# Patient Record
Sex: FEMALE | Race: WHITE | NOT HISPANIC OR LATINO | Employment: UNEMPLOYED | URBAN - METROPOLITAN AREA
[De-identification: names, ages, dates, MRNs, and addresses within clinical notes are randomized per-mention and may not be internally consistent; named-entity substitution may affect disease eponyms.]

---

## 2017-07-25 ENCOUNTER — HISTORICAL (OUTPATIENT)
Dept: INTERNAL MEDICINE | Facility: CLINIC | Age: 42
End: 2017-07-25

## 2017-08-08 ENCOUNTER — HISTORICAL (OUTPATIENT)
Dept: INTERNAL MEDICINE | Facility: CLINIC | Age: 42
End: 2017-08-08

## 2018-08-08 LAB
HUMAN PAPILLOMAVIRUS (HPV): NORMAL
POC BETA-HCG (QUAL): NEGATIVE

## 2018-08-18 LAB
PAP RECOMMENDATION EXT: NORMAL
PAP SMEAR: NORMAL

## 2018-09-07 ENCOUNTER — HISTORICAL (OUTPATIENT)
Dept: RADIOLOGY | Facility: HOSPITAL | Age: 43
End: 2018-09-07

## 2019-06-26 ENCOUNTER — HISTORICAL (OUTPATIENT)
Dept: ADMINISTRATIVE | Facility: HOSPITAL | Age: 44
End: 2019-06-26

## 2019-06-26 LAB
PROLACTIN LEVEL (OHS): 3.1 NG/ML (ref 1–24)
TSH SERPL-ACNC: 2.44 MIU/L (ref 0.36–3.74)

## 2022-04-10 ENCOUNTER — HISTORICAL (OUTPATIENT)
Dept: ADMINISTRATIVE | Facility: HOSPITAL | Age: 47
End: 2022-04-10
Payer: MEDICAID

## 2022-04-30 VITALS
WEIGHT: 227.06 LBS | HEIGHT: 67 IN | OXYGEN SATURATION: 98 % | DIASTOLIC BLOOD PRESSURE: 100 MMHG | BODY MASS INDEX: 35.64 KG/M2 | SYSTOLIC BLOOD PRESSURE: 150 MMHG

## 2022-04-30 NOTE — PROGRESS NOTES
Patient:   Serge Krishnamurthy             MRN: [id number]            FIN: [id number]               Age:   41 years     Sex:  Female     :  1975   Associated Diagnoses:   None   Author:   Denise Sandoval MD      Notified Ms. Krishnamurthy of breast ultrasound results. Also discussed case with Dr. Nolasco, who recommended close f/u for ductal ectasia. All questions answered. Will continue close observation with breast ultrasound every 6 months.

## 2022-05-13 ENCOUNTER — TELEPHONE (OUTPATIENT)
Dept: FAMILY MEDICINE | Facility: CLINIC | Age: 47
End: 2022-05-13
Payer: MEDICAID

## 2022-05-13 NOTE — TELEPHONE ENCOUNTER
----- Message from Oralia Glover sent at 5/12/2022 12:16 PM CDT -----  Regarding: Self pay  Type:  Needs Medical Advice    Who Called: Pt  Symptoms (please be specific):   How long has patient had these symptoms:    Pharmacy name and phone #:    Would the patient rather a call back or a response via [facility]? Call back  Best Call Back Number: [phone]  Additional Information: PT DOES HAVE MEDICAID AND WAS TOLD CLINIC DOES NOT ACCEPT AT THIS TIME.. SHE STILL WANTED TO PROCEED TO MAKE APPT AND WANTS TO BE ''SELF PAY''

## 2022-08-01 ENCOUNTER — HOSPITAL ENCOUNTER (EMERGENCY)
Facility: HOSPITAL | Age: 47
Discharge: HOME | End: 2022-08-01
Attending: EMERGENCY MEDICINE
Payer: MEDICAID

## 2022-08-01 VITALS
BODY MASS INDEX: 32.14 KG/M2 | OXYGEN SATURATION: 97 % | RESPIRATION RATE: 19 BRPM | SYSTOLIC BLOOD PRESSURE: 136 MMHG | HEART RATE: 83 BPM | DIASTOLIC BLOOD PRESSURE: 74 MMHG | WEIGHT: 200 LBS | HEIGHT: 66 IN | TEMPERATURE: 98 F

## 2022-08-01 DIAGNOSIS — S22.32XA CLOSED FRACTURE OF ONE RIB OF LEFT SIDE, INITIAL ENCOUNTER: Primary | ICD-10-CM
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
## 2022-08-01 PROCEDURE — 25000003 PHARM REV CODE 250: Performed by: EMERGENCY MEDICINE

## 2022-08-01 PROCEDURE — 99283 EMERGENCY DEPT VISIT LOW MDM: CPT | Mod: 25

## 2022-08-01 RX ORDER — BUPRENORPHINE HYDROCHLORIDE AND NALOXONE HYDROCHLORIDE 5.7; 1.4 MG/1; MG/1
1 TABLET, ORALLY DISINTEGRATING SUBLINGUAL 2 TIMES DAILY
COMMUNITY
Start: 2022-07-06

## 2022-08-01 RX ORDER — AMLODIPINE BESYLATE 10 MG/1
10 TABLET ORAL DAILY
COMMUNITY
Start: 2022-07-14 | End: 2023-02-15

## 2022-08-01 RX ORDER — HYDROCODONE BITARTRATE AND ACETAMINOPHEN 10; 325 MG/1; MG/1
1 TABLET ORAL
Status: COMPLETED | OUTPATIENT
Start: 2022-08-01 | End: 2022-08-01

## 2022-08-01 RX ORDER — FLUOXETINE HYDROCHLORIDE 20 MG/1
20 CAPSULE ORAL DAILY
COMMUNITY
Start: 2022-07-06

## 2022-08-01 RX ORDER — ESCITALOPRAM OXALATE 10 MG/1
10 TABLET ORAL DAILY
Status: ON HOLD | COMMUNITY
Start: 2022-07-14 | End: 2023-01-27 | Stop reason: ALTCHOICE

## 2022-08-01 RX ORDER — BUSPIRONE HYDROCHLORIDE 15 MG/1
15 TABLET ORAL 3 TIMES DAILY
COMMUNITY
Start: 2022-07-06

## 2022-08-01 RX ORDER — GABAPENTIN 300 MG/1
300 CAPSULE ORAL 3 TIMES DAILY
Status: ON HOLD | COMMUNITY
Start: 2022-07-14 | End: 2023-01-28 | Stop reason: SDUPTHER

## 2022-08-01 RX ORDER — TRAZODONE HYDROCHLORIDE 100 MG/1
100 TABLET ORAL NIGHTLY
COMMUNITY
Start: 2022-07-06

## 2022-08-01 RX ORDER — HYDROCODONE BITARTRATE AND ACETAMINOPHEN 10; 325 MG/1; MG/1
1 TABLET ORAL EVERY 6 HOURS PRN
Qty: 28 TABLET | Refills: 0 | Status: SHIPPED | OUTPATIENT
Start: 2022-08-01 | End: 2022-08-08

## 2022-08-01 RX ADMIN — HYDROCODONE BITARTRATE AND ACETAMINOPHEN 1 TABLET: 10; 325 TABLET ORAL at 09:08

## 2022-08-01 NOTE — ED PROVIDER NOTES
Encounter Date: 8/1/2022       History     Chief Complaint   Patient presents with    Rib Injury     Left rib pain after a trip and fall 3 days ago, pt states she landed on her left side and felt a pop, she states there is constant pain and increased pain with activity or breathing      The history is provided by the patient. No  was used.   Chest Pain  The current episode started several days ago. Duration of episode(s) is 3 days. Chest pain occurs constantly. The chest pain is unchanged. The pain is associated with breathing. The quality of the pain is described as aching. The pain does not radiate. Chest pain is worsened by deep breathing and certain positions. Pertinent negatives for primary symptoms include no fever, no shortness of breath, no cough and no nausea.   Pertinent negatives for associated symptoms include no weakness.   Tripped over mattress that was on the floor 3 nights ago and struck left lower chest on coffee table.    Review of patient's allergies indicates:   Allergen Reactions    Opioids - morphine analogues Other (See Comments)     Increase heart rate    Penicillins Hives    Toradol [ketorolac]      asprin     History reviewed. No pertinent past medical history. HTN, diverticulosis  History reviewed. No pertinent surgical history.  History reviewed. No pertinent family history.  Social History     Tobacco Use    Smoking status: Current Every Day Smoker     Packs/day: 1.00     Types: Cigarettes    Smokeless tobacco: Never Used     Review of Systems   Constitutional: Negative for fever.   HENT: Negative for sore throat.    Respiratory: Negative for cough and shortness of breath.    Cardiovascular: Positive for chest pain.   Gastrointestinal: Negative for nausea.   Genitourinary: Negative for dysuria.   Musculoskeletal: Negative for back pain.   Skin: Negative for rash.   Neurological: Negative for weakness.   Hematological: Does not bruise/bleed easily.       Physical  Exam     Initial Vitals [08/01/22 0934]   BP Pulse Resp Temp SpO2   138/75 83 18 97.7 °F (36.5 °C) 96 %      MAP       --         Physical Exam    Nursing note and vitals reviewed.  Constitutional: She appears well-developed and well-nourished.   HENT:   Head: Normocephalic and atraumatic.   Right Ear: External ear normal.   Left Ear: External ear normal.   Eyes: Conjunctivae and EOM are normal. Pupils are equal, round, and reactive to light.   Neck: Neck supple.   Normal range of motion.  Cardiovascular: Normal rate, regular rhythm, normal heart sounds and intact distal pulses.   Pulmonary/Chest: Breath sounds normal.   Abdominal: Abdomen is soft. Bowel sounds are normal.   Musculoskeletal:         General: Normal range of motion.        Arms:       Cervical back: Normal range of motion and neck supple.     Neurological: She is alert and oriented to person, place, and time. GCS score is 15. GCS eye subscore is 4. GCS verbal subscore is 5. GCS motor subscore is 6.   Skin: Skin is warm and dry. Capillary refill takes less than 2 seconds.   Psychiatric: She has a normal mood and affect. Her behavior is normal. Judgment and thought content normal.         ED Course   Procedures  Labs Reviewed - No data to display       Imaging Results          X-Ray Ribs 2 View Left (Final result)  Result time 08/01/22 10:36:46    Final result by Elda Cordoav MD (08/01/22 10:36:46)                 Impression:      Nondisplaced fracture of the left lateral 6th rib.      Electronically signed by: Elda Cordova  Date:    08/01/2022  Time:    10:36             Narrative:    EXAMINATION:  XR RIBS 2 VIEW LEFT    CLINICAL HISTORY:  Unspecified fall, initial encounter    COMPARISON:  None.    FINDINGS:  There is a nondisplaced fracture of the left lateral 6th rib.  The left lung appears clear without pleural effusion or visible pneumothorax.                                 Medications   HYDROcodone-acetaminophen  mg per tablet  1 tablet (1 tablet Oral Given 8/1/22 0954)                          Clinical Impression:   Final diagnoses:  [W19.XXXA] Fall  [S22.32XA] Closed fracture of one rib of left side, initial encounter (Primary)          ED Disposition Condition    Discharge Stable        ED Prescriptions     Medication Sig Dispense Start Date End Date Auth. Provider    HYDROcodone-acetaminophen (NORCO)  mg per tablet Take 1 tablet by mouth every 6 (six) hours as needed for Pain. 28 tablet 8/1/2022 8/8/2022 Christian Grove MD        Follow-up Information     Follow up With Specialties Details Why Contact Info    Follow up with your primary care provider in 2 weeks if not improved               Christian Grove MD  08/01/22 1183

## 2022-09-21 ENCOUNTER — HISTORICAL (OUTPATIENT)
Dept: ADMINISTRATIVE | Facility: HOSPITAL | Age: 47
End: 2022-09-21
Payer: MEDICAID

## 2022-10-02 ENCOUNTER — HOSPITAL ENCOUNTER (EMERGENCY)
Facility: HOSPITAL | Age: 47
Discharge: HOME | End: 2022-10-02
Attending: EMERGENCY MEDICINE
Payer: MEDICAID

## 2022-10-02 VITALS
RESPIRATION RATE: 19 BRPM | HEART RATE: 59 BPM | OXYGEN SATURATION: 100 % | TEMPERATURE: 98 F | DIASTOLIC BLOOD PRESSURE: 102 MMHG | SYSTOLIC BLOOD PRESSURE: 172 MMHG

## 2022-10-02 DIAGNOSIS — Z76.0 MEDICATION REFILL: ICD-10-CM

## 2022-10-02 DIAGNOSIS — R10.10 UPPER ABDOMINAL PAIN: ICD-10-CM

## 2022-10-02 DIAGNOSIS — K29.70 GASTRITIS, PRESENCE OF BLEEDING UNSPECIFIED, UNSPECIFIED CHRONICITY, UNSPECIFIED GASTRITIS TYPE: Primary | ICD-10-CM

## 2022-10-02 LAB
ALBUMIN SERPL-MCNC: 3.9 GM/DL (ref 3.5–5)
ALBUMIN/GLOB SERPL: 1.1 RATIO (ref 1.1–2)
ALP SERPL-CCNC: 146 UNIT/L (ref 40–150)
ALT SERPL-CCNC: 11 UNIT/L (ref 0–55)
APPEARANCE UR: CLEAR
AST SERPL-CCNC: 11 UNIT/L (ref 5–34)
B-HCG SERPL QL: NEGATIVE
BACTERIA #/AREA URNS AUTO: NORMAL /HPF
BASOPHILS # BLD AUTO: 0.04 X10(3)/MCL (ref 0–0.2)
BASOPHILS NFR BLD AUTO: 0.5 %
BILIRUB UR QL STRIP.AUTO: NEGATIVE MG/DL
BILIRUBIN DIRECT+TOT PNL SERPL-MCNC: 0.2 MG/DL
BUN SERPL-MCNC: 12.7 MG/DL (ref 7–18.7)
CALCIUM SERPL-MCNC: 9.6 MG/DL (ref 8.4–10.2)
CHLORIDE SERPL-SCNC: 105 MMOL/L (ref 98–107)
CO2 SERPL-SCNC: 28 MMOL/L (ref 22–29)
COLOR UR AUTO: YELLOW
CREAT SERPL-MCNC: 0.8 MG/DL (ref 0.55–1.02)
EOSINOPHIL # BLD AUTO: 0.2 X10(3)/MCL (ref 0–0.9)
EOSINOPHIL NFR BLD AUTO: 2.5 %
ERYTHROCYTE [DISTWIDTH] IN BLOOD BY AUTOMATED COUNT: 15 % (ref 11.5–17)
GFR SERPLBLD CREATININE-BSD FMLA CKD-EPI: >60 MLS/MIN/1.73/M2
GLOBULIN SER-MCNC: 3.5 GM/DL (ref 2.4–3.5)
GLUCOSE SERPL-MCNC: 128 MG/DL (ref 74–100)
GLUCOSE UR QL STRIP.AUTO: NEGATIVE MG/DL
HCT VFR BLD AUTO: 45.2 % (ref 37–47)
HGB BLD-MCNC: 14.6 GM/DL (ref 12–16)
IMM GRANULOCYTES # BLD AUTO: 0.03 X10(3)/MCL (ref 0–0.04)
IMM GRANULOCYTES NFR BLD AUTO: 0.4 %
KETONES UR QL STRIP.AUTO: NEGATIVE MG/DL
LEUKOCYTE ESTERASE UR QL STRIP.AUTO: NEGATIVE UNIT/L
LYMPHOCYTES # BLD AUTO: 2.09 X10(3)/MCL (ref 0.6–4.6)
LYMPHOCYTES NFR BLD AUTO: 26.5 %
MCH RBC QN AUTO: 32 PG (ref 27–31)
MCHC RBC AUTO-ENTMCNC: 32.3 MG/DL (ref 33–36)
MCV RBC AUTO: 99.1 FL (ref 80–94)
MONOCYTES # BLD AUTO: 0.57 X10(3)/MCL (ref 0.1–1.3)
MONOCYTES NFR BLD AUTO: 7.2 %
NEUTROPHILS # BLD AUTO: 5 X10(3)/MCL (ref 2.1–9.2)
NEUTROPHILS NFR BLD AUTO: 62.9 %
NITRITE UR QL STRIP.AUTO: NEGATIVE
NRBC BLD AUTO-RTO: 0 %
PH UR STRIP.AUTO: 7 [PH]
PLATELET # BLD AUTO: 247 X10(3)/MCL (ref 130–400)
PMV BLD AUTO: 11 FL (ref 7.4–10.4)
POTASSIUM SERPL-SCNC: 4.8 MMOL/L (ref 3.5–5.1)
PROT SERPL-MCNC: 7.4 GM/DL (ref 6.4–8.3)
PROT UR QL STRIP.AUTO: NEGATIVE MG/DL
RBC # BLD AUTO: 4.56 X10(6)/MCL (ref 4.2–5.4)
RBC #/AREA URNS AUTO: <5 /HPF
RBC UR QL AUTO: ABNORMAL UNIT/L
SODIUM SERPL-SCNC: 142 MMOL/L (ref 136–145)
SP GR UR STRIP.AUTO: 1.01 (ref 1–1.03)
SQUAMOUS #/AREA URNS AUTO: <5 /HPF
TROPONIN I SERPL-MCNC: <0.01 NG/ML (ref 0–0.04)
UROBILINOGEN UR STRIP-ACNC: 0.2 MG/DL
WBC # SPEC AUTO: 7.9 X10(3)/MCL (ref 4.5–11.5)
WBC #/AREA URNS AUTO: <5 /HPF

## 2022-10-02 PROCEDURE — 96375 TX/PRO/DX INJ NEW DRUG ADDON: CPT

## 2022-10-02 PROCEDURE — 96372 THER/PROPH/DIAG INJ SC/IM: CPT | Mod: 59 | Performed by: NURSE PRACTITIONER

## 2022-10-02 PROCEDURE — 84484 ASSAY OF TROPONIN QUANT: CPT | Performed by: NURSE PRACTITIONER

## 2022-10-02 PROCEDURE — 85025 COMPLETE CBC W/AUTO DIFF WBC: CPT | Performed by: NURSE PRACTITIONER

## 2022-10-02 PROCEDURE — 25500020 PHARM REV CODE 255: Performed by: NURSE PRACTITIONER

## 2022-10-02 PROCEDURE — 93010 ELECTROCARDIOGRAM REPORT: CPT | Mod: ,,, | Performed by: INTERNAL MEDICINE

## 2022-10-02 PROCEDURE — 93005 ELECTROCARDIOGRAM TRACING: CPT

## 2022-10-02 PROCEDURE — 63600175 PHARM REV CODE 636 W HCPCS: Performed by: NURSE PRACTITIONER

## 2022-10-02 PROCEDURE — 25000003 PHARM REV CODE 250: Performed by: NURSE PRACTITIONER

## 2022-10-02 PROCEDURE — 81001 URINALYSIS AUTO W/SCOPE: CPT | Performed by: NURSE PRACTITIONER

## 2022-10-02 PROCEDURE — 96374 THER/PROPH/DIAG INJ IV PUSH: CPT

## 2022-10-02 PROCEDURE — 36415 COLL VENOUS BLD VENIPUNCTURE: CPT | Performed by: NURSE PRACTITIONER

## 2022-10-02 PROCEDURE — 99285 EMERGENCY DEPT VISIT HI MDM: CPT | Mod: 25

## 2022-10-02 PROCEDURE — 93010 EKG 12-LEAD: ICD-10-PCS | Mod: ,,, | Performed by: INTERNAL MEDICINE

## 2022-10-02 PROCEDURE — 81025 URINE PREGNANCY TEST: CPT | Performed by: NURSE PRACTITIONER

## 2022-10-02 PROCEDURE — 80053 COMPREHEN METABOLIC PANEL: CPT | Performed by: NURSE PRACTITIONER

## 2022-10-02 RX ORDER — SUCRALFATE 1 G/1
1 TABLET ORAL 4 TIMES DAILY
Qty: 20 TABLET | Refills: 0 | Status: SHIPPED | OUTPATIENT
Start: 2022-10-02 | End: 2022-10-07

## 2022-10-02 RX ORDER — METOCLOPRAMIDE HYDROCHLORIDE 5 MG/ML
10 INJECTION INTRAMUSCULAR; INTRAVENOUS
Status: COMPLETED | OUTPATIENT
Start: 2022-10-02 | End: 2022-10-02

## 2022-10-02 RX ORDER — DICYCLOMINE HYDROCHLORIDE 20 MG/1
20 TABLET ORAL 3 TIMES DAILY PRN
Qty: 20 TABLET | Refills: 0 | Status: SHIPPED | OUTPATIENT
Start: 2022-10-02 | End: 2022-10-09

## 2022-10-02 RX ORDER — SODIUM CHLORIDE 9 MG/ML
1000 INJECTION, SOLUTION INTRAVENOUS
Status: COMPLETED | OUTPATIENT
Start: 2022-10-02 | End: 2022-10-02

## 2022-10-02 RX ORDER — ONDANSETRON 2 MG/ML
4 INJECTION INTRAMUSCULAR; INTRAVENOUS ONCE
Status: COMPLETED | OUTPATIENT
Start: 2022-10-02 | End: 2022-10-02

## 2022-10-02 RX ORDER — MAG HYDROX/ALUMINUM HYD/SIMETH 200-200-20
30 SUSPENSION, ORAL (FINAL DOSE FORM) ORAL ONCE
Status: COMPLETED | OUTPATIENT
Start: 2022-10-02 | End: 2022-10-02

## 2022-10-02 RX ORDER — LIDOCAINE HYDROCHLORIDE 20 MG/ML
15 SOLUTION OROPHARYNGEAL ONCE
Status: COMPLETED | OUTPATIENT
Start: 2022-10-02 | End: 2022-10-02

## 2022-10-02 RX ORDER — FAMOTIDINE 20 MG/1
20 TABLET, FILM COATED ORAL 2 TIMES DAILY
Qty: 60 TABLET | Refills: 0 | Status: SHIPPED | OUTPATIENT
Start: 2022-10-02 | End: 2023-03-21 | Stop reason: SDUPTHER

## 2022-10-02 RX ORDER — ONDANSETRON 4 MG/1
4 TABLET, ORALLY DISINTEGRATING ORAL EVERY 8 HOURS PRN
Qty: 21 TABLET | Refills: 0 | Status: SHIPPED | OUTPATIENT
Start: 2022-10-02 | End: 2022-10-09

## 2022-10-02 RX ORDER — GABAPENTIN 300 MG/1
300 CAPSULE ORAL 2 TIMES DAILY
Qty: 60 CAPSULE | Refills: 0 | Status: SHIPPED | OUTPATIENT
Start: 2022-10-02 | End: 2022-11-01

## 2022-10-02 RX ORDER — DICYCLOMINE HYDROCHLORIDE 10 MG/ML
20 INJECTION INTRAMUSCULAR
Status: COMPLETED | OUTPATIENT
Start: 2022-10-02 | End: 2022-10-02

## 2022-10-02 RX ADMIN — LIDOCAINE HYDROCHLORIDE 15 ML: 20 SOLUTION ORAL; TOPICAL at 05:10

## 2022-10-02 RX ADMIN — METOCLOPRAMIDE 10 MG: 5 INJECTION, SOLUTION INTRAMUSCULAR; INTRAVENOUS at 04:10

## 2022-10-02 RX ADMIN — ALUMINUM HYDROXIDE, MAGNESIUM HYDROXIDE, AND SIMETHICONE 30 ML: 200; 200; 20 SUSPENSION ORAL at 05:10

## 2022-10-02 RX ADMIN — SODIUM CHLORIDE 1000 ML: 9 INJECTION, SOLUTION INTRAVENOUS at 11:10

## 2022-10-02 RX ADMIN — IOPAMIDOL 100 ML: 755 INJECTION, SOLUTION INTRAVENOUS at 03:10

## 2022-10-02 RX ADMIN — DICYCLOMINE HYDROCHLORIDE 20 MG: 20 INJECTION, SOLUTION INTRAMUSCULAR at 02:10

## 2022-10-02 RX ADMIN — ONDANSETRON 4 MG: 2 INJECTION INTRAMUSCULAR; INTRAVENOUS at 12:10

## 2022-10-02 NOTE — FIRST PROVIDER EVALUATION
Medical screening examination initiated.  I have conducted a focused provider triage encounter, findings are as follows:    Brief history of present illness:  45y/o F presents to the ED with upper abdominal pain with nausea. States hx of diverticulosis     Vitals:    10/02/22 1136   BP: (!) 185/118   Pulse: 69   Resp: 18   Temp: 97.9 °F (36.6 °C)   TempSrc: Temporal   SpO2: 96%       Pertinent physical exam:  AAAx 3    Brief workup plan:  labs/medications     Preliminary workup initiated; this workup will be continued and followed by the physician or advanced practice provider that is assigned to the patient when roomed.

## 2022-10-02 NOTE — DISCHARGE INSTRUCTIONS
Pepcid twice a day. Carafate before eating to help with stomach pain. Zofran for nausea/vomiting. Gabapentin for back pain. Bentyl for abdominal pain.

## 2022-10-02 NOTE — ED PROVIDER NOTES
Encounter Date: 10/2/2022       History     Chief Complaint   Patient presents with    Abdominal Pain     POV with upper abdominal pain. Hx diverticulitis. N/V present, denies diarrhea. Started yesterday     47 y/o female who presents with epigastric, LUQ abdominal pain since yesterday with nausea and vomiting. No diarrhea. Last BM yesterday, normal for her. She states she feels bloated. Hx diverticulitis and this episode feels similar. No dysuria or urinary frequency. Denies fever.     The history is provided by the patient. No  was used.   Abdominal Pain  The current episode started yesterday. The abdominal pain is located in the epigastric region and LUQ. The abdominal pain does not radiate. The severity of the abdominal pain is 5/10. The other symptoms of the illness include nausea.   Review of patient's allergies indicates:   Allergen Reactions    Ketorolac      Other reaction(s): Cramping    Penicillins      Other reaction(s): short of breath, rash    Opioids - morphine analogues Palpitations     No past medical history on file.  No past surgical history on file.  No family history on file.     Review of Systems   Gastrointestinal:  Positive for abdominal distention, abdominal pain and nausea.   All other systems reviewed and are negative.    Physical Exam     Initial Vitals [10/02/22 1136]   BP Pulse Resp Temp SpO2   (!) 185/118 69 18 97.9 °F (36.6 °C) 96 %      MAP       --         Physical Exam    Nursing note and vitals reviewed.  Constitutional: She appears well-developed and well-nourished.   Eyes: Conjunctivae are normal.   Cardiovascular:  Normal rate, regular rhythm and normal heart sounds.           Pulmonary/Chest: Breath sounds normal. No respiratory distress.   Abdominal: Abdomen is soft. There is abdominal tenderness in the epigastric area and left upper quadrant.     Neurological: She is alert and oriented to person, place, and time.   Skin: Skin is warm and dry.    Psychiatric: She has a normal mood and affect.       ED Course   Procedures  Labs Reviewed   COMPREHENSIVE METABOLIC PANEL - Abnormal; Notable for the following components:       Result Value    Glucose Level 128 (*)     All other components within normal limits   URINALYSIS - Abnormal; Notable for the following components:    Blood, UA Trace (*)     All other components within normal limits   CBC WITH DIFFERENTIAL - Abnormal; Notable for the following components:    MCV 99.1 (*)     MCH 32.0 (*)     MCHC 32.3 (*)     MPV 11.0 (*)     All other components within normal limits   TROPONIN I - Normal   PREGNANCY TEST, URINE RAPID - Normal   URINALYSIS, MICROSCOPIC - Normal   CBC W/ AUTO DIFFERENTIAL    Narrative:     The following orders were created for panel order CBC Auto Differential.  Procedure                               Abnormality         Status                     ---------                               -----------         ------                     CBC with Differential[[id number]]        Abnormal            Final result                 Please view results for these tests on the individual orders.     EKG Readings: (Independently Interpreted)   Initial Reading: No STEMI. Rhythm: Normal Sinus Rhythm. Heart Rate: 61. Ectopy: No Ectopy. T Waves Flipped: V2 and V3. Clinical Impression: Normal Sinus Rhythm Other Impression: with flipped t waves in V2 and V3   ECG Results              EKG 12-lead (Final result)  Result time 10/02/22 15:08:12      Final result by Interface, [facility] (10/02/22 15:08:12)                   Narrative:    Test Reason : R10.10,    Vent. Rate : 061 BPM     Atrial Rate : 061 BPM     P-R Int : 176 ms          QRS Dur : 076 ms      QT Int : 412 ms       P-R-T Axes : 061 065 083 degrees     QTc Int : 414 ms    Normal sinus rhythm  Nonspecific T wave abnormality  Abnormal ECG  No previous ECGs available  Confirmed by Aquiles Sandoval MD ([id number]) on 10/2/2022 3:08:05 PM    Referred By:              Confirmed By:Aquiles Sandoval MD                                  Imaging Results              CT Abdomen Pelvis With Contrast (Final result)  Result time 10/02/22 15:44:36      Final result by Denny Thompson MD (10/02/22 15:44:36)                   Impression:      No definite acute abnormality identified within the abdomen and pelvis.  Mild hyperemia and thickening of the lesser curvature of the stomach.  Mild gastritis is not excluded (series 2, image 26).      Electronically signed by: Denny Thompson  Date:    10/02/2022  Time:    15:44               Narrative:    EXAMINATION:  CT ABDOMEN PELVIS WITH CONTRAST    CLINICAL HISTORY:  Epigastric pain;epigastric and LUQ abd pain; hx diverticulitis;    TECHNIQUE:  Multidetector IV contrast enhanced axial CT images of the abdomen and pelvis were obtained with coronal and sagittal reconstructions.    Automatic exposure control was utilized to reduce the patient's radiation dose.    DLP= 809    COMPARISON:  12/02/2019    FINDINGS:  01. HEPATOBILIARY: No focal hepatic lesion is identified, status post cholecystectomy.    02. SPLEEN: Normal    03. PANCREAS: No focal masses or ductal dilatation.    04. ADRENALS: No adrenal nodules.    05. KIDNEYS: The right kidney demonstrates no stone, hydronephrosis, or hydroureter. No focal mass identified. The left kidney demonstrates no stone, hydronephrosis, or hydroureter. No focal mass identified.    06. LYMPHADENOPATHY/RETROPERITONEUM: There is no retroperitoneal lymphadenopathy. The abdominal aorta is normal in course and caliber.    07. BOWEL: Mild hyperemia of the lesser curvature of the stomach.  No evidence of appendiceal inflammation.    08. PELVIC VISCERA: Normal. No pelvic mass.    09. PELVIC LYMPH NODES: No lymphadenopathy.    10. PERITONEUM/ABDOMINAL WALL: No ascites or implant.    11. SKELETAL: No aggressive appearing lytic/blastic lesion. No acute fractures, subluxations or dislocations.    12. LUNG BASES: The  visualized lungs are unremarkable.                                       Medications   metoclopramide HCl injection 10 mg (has no administration in time range)   aluminum-magnesium hydroxide-simethicone 200-200-20 mg/5 mL suspension 30 mL (has no administration in time range)     And   LIDOcaine HCl 2% oral solution 15 mL (has no administration in time range)   ondansetron injection 4 mg (4 mg Intravenous Given 10/2/22 1252)   0.9%  NaCl infusion (1,000 mLs Intravenous New Bag 10/2/22 1150)   dicyclomine injection 20 mg (20 mg Intramuscular Given 10/2/22 1415)   iopamidoL (ISOVUE-370) injection 100 mL (100 mLs Intravenous Given 10/2/22 1535)     Medical Decision Making:   Clinical Tests:   Lab Tests: Ordered and Reviewed  Radiological Study: Ordered and Reviewed  ED Management:  Lab work unremarkable for acute findings, no leukocytosis. Afebrile. Abd pain more epigastric/upper abd pain. CT shows mild hyperemia in the stomach - concern for mild gastritis. No diverticulitis noted. Nontoxic in appearance. Will treat for gastritis. She also asked for gabapentin for her chronic back until she can see her pcp on 18th.   Additional MDM:   Differential Diagnosis:   Other: The following diagnoses were also considered and will be evaluated: gastritis, diverticulitis.                       Clinical Impression:   Final diagnoses:  [R10.10] Upper abdominal pain  [K29.70] Gastritis, presence of bleeding unspecified, unspecified chronicity, unspecified gastritis type (Primary)  [Z76.0] Medication refill        ED Disposition Condition    Discharge Stable          ED Prescriptions       Medication Sig Dispense Start Date End Date Auth. Provider    sucralfate (CARAFATE) 1 gram tablet Take 1 tablet (1 g total) by mouth 4 (four) times daily. for 5 days 20 tablet 10/2/2022 10/7/2022 WILIAM Moses    famotidine (PEPCID) 20 MG tablet Take 1 tablet (20 mg total) by mouth 2 (two) times daily. 60 tablet 10/2/2022 11/1/2022 Maria Teresa  WILIAM Rubio    dicyclomine (BENTYL) 20 mg tablet Take 1 tablet (20 mg total) by mouth 3 (three) times daily as needed (abdominal pain). 20 tablet 10/2/2022 10/9/2022 WILIAM Moses    gabapentin (NEURONTIN) 300 MG capsule Take 1 capsule (300 mg total) by mouth 2 (two) times daily. 60 capsule 10/2/2022 11/1/2022 WILIAM Moses    ondansetron (ZOFRAN-ODT) 4 MG TbDL Take 1 tablet (4 mg total) by mouth every 8 (eight) hours as needed (nausea/vomiting). 21 tablet 10/2/2022 10/9/2022 WILIAM Moses          Follow-up Information       Follow up With Specialties Details Why Contact Info    primary care provider   keep scheduled appointment on 18th              WILIAM Moses  10/02/22 [id number]

## 2022-10-09 ENCOUNTER — HOSPITAL ENCOUNTER (EMERGENCY)
Facility: HOSPITAL | Age: 47
Discharge: HOME | End: 2022-10-09
Attending: INTERNAL MEDICINE
Payer: MEDICAID

## 2022-10-09 VITALS
DIASTOLIC BLOOD PRESSURE: 98 MMHG | BODY MASS INDEX: 38.89 KG/M2 | TEMPERATURE: 99 F | OXYGEN SATURATION: 98 % | HEART RATE: 85 BPM | RESPIRATION RATE: 18 BRPM | WEIGHT: 242 LBS | HEIGHT: 66 IN | SYSTOLIC BLOOD PRESSURE: 155 MMHG

## 2022-10-09 DIAGNOSIS — J18.0 BRONCHOPNEUMONIA: Primary | ICD-10-CM

## 2022-10-09 PROCEDURE — 96372 THER/PROPH/DIAG INJ SC/IM: CPT | Performed by: INTERNAL MEDICINE

## 2022-10-09 PROCEDURE — 63600175 PHARM REV CODE 636 W HCPCS: Performed by: INTERNAL MEDICINE

## 2022-10-09 PROCEDURE — 99284 EMERGENCY DEPT VISIT MOD MDM: CPT | Mod: 25

## 2022-10-09 RX ORDER — AZITHROMYCIN 250 MG/1
TABLET, FILM COATED ORAL
Qty: 6 TABLET | Refills: 0 | Status: ON HOLD | OUTPATIENT
Start: 2022-10-09 | End: 2023-01-27 | Stop reason: ALTCHOICE

## 2022-10-09 RX ORDER — ALBUTEROL SULFATE 90 UG/1
2 AEROSOL, METERED RESPIRATORY (INHALATION) EVERY 6 HOURS PRN
Qty: 18 G | Refills: 0 | Status: SHIPPED | OUTPATIENT
Start: 2022-10-09 | End: 2023-02-15

## 2022-10-09 RX ORDER — GUAIFENESIN/DEXTROMETHORPHAN 100-10MG/5
10 SYRUP ORAL EVERY 6 HOURS PRN
Qty: 180 ML | Refills: 0 | Status: ON HOLD | OUTPATIENT
Start: 2022-10-09 | End: 2023-01-27 | Stop reason: ALTCHOICE

## 2022-10-09 RX ORDER — METHYLPREDNISOLONE SOD SUCC 125 MG
125 VIAL (EA) INJECTION ONCE
Status: COMPLETED | OUTPATIENT
Start: 2022-10-09 | End: 2022-10-09

## 2022-10-09 RX ORDER — PREDNISONE 20 MG/1
20 TABLET ORAL DAILY
Qty: 5 TABLET | Refills: 0 | Status: SHIPPED | OUTPATIENT
Start: 2022-10-09 | End: 2022-10-14

## 2022-10-09 RX ADMIN — METHYLPREDNISOLONE SODIUM SUCCINATE 125 MG: 125 INJECTION, POWDER, FOR SOLUTION INTRAMUSCULAR; INTRAVENOUS at 05:10

## 2022-10-09 NOTE — ED PROVIDER NOTES
Source of History:  Patient, no limitations    Chief complaint:  Shortness of Breath (C/o shortness of breath and intermittent swelling to multiple body areas over the past few months)      HPI:  Serge Krishnamurthy is a 46 y.o. female presenting with Shortness of Breath (C/o shortness of breath and intermittent swelling to multiple body areas over the past few months)       Two different complaints: leg swelling and cough.  Leg swelling is associated with recent addition of Norvasc, recent labs reviewed and benign.  Second complaint is of cough with mild wheeze, currently is a smoker, no fever, no chills, no orthopnea  Cough: Patient complains of productive cough.  Symptoms began a few weeks ago.  The cough is with wheezing and is aggravated by cold air Associated symptoms include:sputum production. Patient does have a history of smoking. Patient  has previous Chest X-ray.       Review of Systems   Constitutional symptoms:  Negative except as documented in HPI.   Skin symptoms:  Negative except as documented in HPI.   HEENT symptoms:  Negative except as documented in HPI.   Respiratory symptoms:  Negative except as documented in HPI.   Cardiovascular symptoms:  Negative except as documented in HPI.   Gastrointestinal symptoms:  Negative except as documented in HPI.    Genitourinary symptoms:  Negative except as documented in HPI.   Musculoskeletal symptoms:  Negative except as documented in HPI.   Neurologic symptoms:  Negative except as documented in HPI.   Psychiatric symptoms:  Negative except as documented in HPI.   Allergy/immunologic symptoms:  Negative except as documented in HPI.             Additional review of systems information: All other systems reviewed and otherwise negative.      Review of patient's allergies indicates:   Allergen Reactions    Ketorolac      Other reaction(s): Cramping    Opioids - morphine analogues Other (See Comments)     Increase heart rate    Penicillins      Other  "reaction(s): short of breath, rash    Penicillins Hives    Toradol [ketorolac]      asprin    Opioids - morphine analogues Palpitations       PMH:  As per HPI and below:    No past medical history on file.     No family history on file.    No past surgical history on file.    Social History     Tobacco Use    Smoking status: Every Day     Packs/day: 1.00     Types: Cigarettes    Smokeless tobacco: Never       There is no problem list on file for this patient.       Physical Exam:    BP (!) 155/98   Pulse 85   Temp 98.6 °F (37 °C)   Resp 18   Ht 5' 6" (1.676 m)   Wt 109.8 kg (242 lb)   SpO2 98%   BMI 39.06 kg/m²     Nursing note and vital signs reviewed.    General:  Alert, no acute distress.   Skin: Normal for Ethnic Origin, No cyanosis  HEENT: Normocephalic and atraumatic, Vision unchanged, Pupils symmetric, No icterus , Nasal mucosa is pink and moist  Cardiovascular:  Regular rate and rhythm, BL LE edema  Chest Wall: No deformity, equal chest rise  Respiratory:  mild wheeze bilaterally, respirations are non-labored.    Musculoskeletal:  No deformity, Normal perfusion to all extremities  Back: No bony tenderness  : No suprapubic pain, No CVA tenderness  Gastrointestinal:  Soft, Nontender, Non distended, Normal bowel sounds.    Neurological:  Alert and oriented to person, place, time, and situation, normal motor observed, normal speech observed.    Psychiatric:  Cooperative, appropriate mood & affect.        Labs that have been ordered have been independently reviewed and interpreted by myself.     Old Chart Reviewed.      Initial Impression/ Differential Dx:  Bronchitis, URI, allergies, irritants, pulmonary edema, GERD, laryngitis, tracheitis, asthma, sinusitis, pneumonia, viral, COPD, medication side effect      MDM:      Reviewed Nurses Note.    Reviewed Pertinent old records.    Orders Placed This Encounter    X-Ray Chest PA And Lateral    methylPREDNISolone sodium succinate injection 125 mg    " predniSONE (DELTASONE) 20 MG tablet    azithromycin (Z-KAVITA) 250 MG tablet    albuterol (VENTOLIN HFA) 90 mcg/actuation inhaler    dextromethorphan-guaiFENesin  mg/5 ml (ROBITUSSIN-DM)  mg/5 mL liquid                    Labs Reviewed - No data to display       X-Ray Chest PA And Lateral   Final Result      No acute cardiopulmonary process identified.         Electronically signed by: Josiah Guzman   Date:    10/09/2022   Time:    17:24           No visits with results within 1 Day(s) from this visit.   Latest known visit with results is:   Admission on 10/02/2022, Discharged on 10/02/2022   Component Date Value Ref Range Status    Sodium Level 10/02/2022 142  136 - 145 mmol/L Final    Potassium Level 10/02/2022 4.8  3.5 - 5.1 mmol/L Final    Chloride 10/02/2022 105  98 - 107 mmol/L Final    Carbon Dioxide 10/02/2022 28  22 - 29 mmol/L Final    Glucose Level 10/02/2022 128 (H)  74 - 100 mg/dL Final    Blood Urea Nitrogen 10/02/2022 12.7  7.0 - 18.7 mg/dL Final    Creatinine 10/02/2022 0.80  0.55 - 1.02 mg/dL Final    Calcium Level Total 10/02/2022 9.6  8.4 - 10.2 mg/dL Final    Protein Total 10/02/2022 7.4  6.4 - 8.3 gm/dL Final    Albumin Level 10/02/2022 3.9  3.5 - 5.0 gm/dL Final    Globulin 10/02/2022 3.5  2.4 - 3.5 gm/dL Final    Albumin/Globulin Ratio 10/02/2022 1.1  1.1 - 2.0 ratio Final    Bilirubin Total 10/02/2022 0.2  <=1.5 mg/dL Final    Alkaline Phosphatase 10/02/2022 146  40 - 150 unit/L Final    Alanine Aminotransferase 10/02/2022 11  0 - 55 unit/L Final    Aspartate Aminotransferase 10/02/2022 11  5 - 34 unit/L Final    eGFR 10/02/2022 >60  mls/min/1.73/m2 Final    Color, UA 10/02/2022 Yellow  Yellow, Colorless, Other, Clear Final    Appearance, UA 10/02/2022 Clear  Clear Final    Specific Gravity, UA 10/02/2022 1.012  1.001 - 1.030 Final    pH, UA 10/02/2022 7.0  5.0, 5.5, 6.0, 6.5, 7.0, 7.5, 8.0, 8.5 Final    Protein, UA 10/02/2022 Negative  Negative, 300  mg/dL Final    Glucose, UA  10/02/2022 Negative  Negative, Normal mg/dL Final    Ketones, UA 10/02/2022 Negative  Negative, +1, +2, +3, +4, +5, >=160, >=80 mg/dL Final    Blood, UA 10/02/2022 Trace (A)  Negative unit/L Final    Bilirubin, UA 10/02/2022 Negative  Negative mg/dL Final    Urobilinogen, UA 10/02/2022 0.2  0.2, 1.0, Normal mg/dL Final    Nitrites, UA 10/02/2022 Negative  Negative Final    Leukocyte Esterase, UA 10/02/2022 Negative  Negative, 75  unit/L Final    WBC 10/02/2022 7.9  4.5 - 11.5 x10(3)/mcL Final    RBC 10/02/2022 4.56  4.20 - 5.40 x10(6)/mcL Final    Hgb 10/02/2022 14.6  12.0 - 16.0 gm/dL Final    Hct 10/02/2022 45.2  37.0 - 47.0 % Final    MCV 10/02/2022 99.1 (H)  80.0 - 94.0 fL Final    MCH 10/02/2022 32.0 (H)  27.0 - 31.0 pg Final    MCHC 10/02/2022 32.3 (L)  33.0 - 36.0 mg/dL Final    RDW 10/02/2022 15.0  11.5 - 17.0 % Final    Platelet 10/02/2022 247  130 - 400 x10(3)/mcL Final    MPV 10/02/2022 11.0 (H)  7.4 - 10.4 fL Final    Neut % 10/02/2022 62.9  % Final    Lymph % 10/02/2022 26.5  % Final    Mono % 10/02/2022 7.2  % Final    Eos % 10/02/2022 2.5  % Final    Basophil % 10/02/2022 0.5  % Final    Lymph # 10/02/2022 2.09  0.6 - 4.6 x10(3)/mcL Final    Neut # 10/02/2022 5.0  2.1 - 9.2 x10(3)/mcL Final    Mono # 10/02/2022 0.57  0.1 - 1.3 x10(3)/mcL Final    Eos # 10/02/2022 0.20  0 - 0.9 x10(3)/mcL Final    Baso # 10/02/2022 0.04  0 - 0.2 x10(3)/mcL Final    IG# 10/02/2022 0.03  0 - 0.04 x10(3)/mcL Final    IG% 10/02/2022 0.4  % Final    NRBC% 10/02/2022 0.0  % Final    Troponin-I 10/02/2022 <0.010  0.000 - 0.045 ng/mL Final    Beta hCG Qualitative, Urine 10/02/2022 Negative  Negative Final    RBC, UA 10/02/2022 <5  <=5 /HPF Final    WBC, UA 10/02/2022 <5  <=5 /HPF Final    Squamous Epithelial Cells, UA 10/02/2022 <5  <=5 /HPF Final    Bacteria, UA 10/02/2022 None Seen  None Seen, Rare, Occasional /HPF Final       Imaging Results              X-Ray Chest PA And Lateral (Final result)  Result time 10/09/22  17:24:19      Final result by Josiah Guzman MD (10/09/22 17:24:19)                   Impression:      No acute cardiopulmonary process identified.      Electronically signed by: Josiah Guzman  Date:    10/09/2022  Time:    17:24               Narrative:    EXAMINATION:  XR CHEST PA AND LATERAL    CLINICAL HISTORY:  leg swelling;    TECHNIQUE:  One view    COMPARISON:  January 21, 2022.    FINDINGS:  Cardiopericardial silhouette is within normal limits.  No acute dense focal or segmental consolidation, congestion, pleural effusion or pneumothorax.                                                                           Diagnostic Impression:    1. Bronchopneumonia         ED Disposition Condition    Discharge Stable             Follow-up Information       [facility] - Emergency Dept.    Specialty: Emergency Medicine  Why: If symptoms worsen  Contact information:  [id number]  [facility] [address] [phone]                            ED Prescriptions       Medication Sig Dispense Start Date End Date Auth. Provider    predniSONE (DELTASONE) 20 MG tablet Take 1 tablet (20 mg total) by mouth once daily. for 5 days 5 tablet 10/9/2022 10/14/2022 Alec Saldana DO    azithromycin (Z-KAVITA) 250 MG tablet Take 2 tablets by mouth on day 1; Take 1 tablet by mouth on days 2-5 6 tablet 10/9/2022 -- Alec Saldana DO    albuterol (VENTOLIN HFA) 90 mcg/actuation inhaler Inhale 2 puffs into the lungs every 6 (six) hours as needed for Wheezing. Rescue 18 g 10/9/2022 -- Alec Saldana DO    dextromethorphan-guaiFENesin  mg/5 ml (ROBITUSSIN-DM)  mg/5 mL liquid Take 10 mLs by mouth every 6 (six) hours as needed (cough). 180 mL 10/9/2022 -- Alec Saldana DO          Follow-up Information       Follow up With Specialties Details Why Contact Info    [facility] - Emergency Dept Emergency Medicine  If symptoms worsen [id number]  [facility] [address]  [phone]             Alec Saldana,   10/09/22 1737

## 2022-10-09 NOTE — ED TRIAGE NOTES
C/o shortness of breath and intermittent swelling to multiple body areas over the past few months. States recent weight gain more than 20 lbs

## 2022-10-10 ENCOUNTER — PATIENT OUTREACH (OUTPATIENT)
Dept: EMERGENCY MEDICINE | Facility: HOSPITAL | Age: 47
End: 2022-10-10
Payer: MEDICAID

## 2022-10-10 NOTE — PATIENT INSTRUCTIONS
Why Should I Have My Own Doctor or Nurse Practitioner (PCP) to Take Care of Me  What is a PCP (Primary Care Provider)?    A primary care provider is a doctor or nurse practitioner who you can call for an appointment and will see you when you are sick.    You will also be seen at scheduled appointment times during the year to check on your diabetes, or high blood pressure, or heart disease.    Why see the same PCP (doctor/nurse practitioner)?    You can be seen faster when you are sick           You, the PCP (doctor/nurse practitioner) and the office staff get to know each other; you begin to trust them to care for you. You take part in your health choices.   All of you together are a team.    Your medicine is looked at every time you visit, to be sure you are taking the medicine, as the PCP (doctor/nurse practitioner) ordered.    Your PCP (doctor/nurse practitioner) and their staff help keep you healthy and out of the hospital.  They can catch sicknesses earlier by ordering tests once a year to stop or prevent the sickness from getting worse.      Your PCP (doctor/nurse practitioner) can send you to providers who specialize (heart/bone/lung) if you need.  They and their office staff help keep track of your seeing other providers (doctors/nurse practitioners) and tests (CT/ MRIs/ X Rays)) taken.        PCPs want you to stay healthy.  Let us care for you.            What Do I Do If I Wake Up Sick                                                     If you wake up sick, or you start to fill sick during the day, try these tips to get care and start to feel better soon.                                                                                                                              As soon as you start to feel bad, call your doctor's office and ask for same day or next day visit appointment.        If you cannot be seen with your doctor's office within 24 hours, you can go to an urgent care and be  seen.          How to stay well:     Take your medicine as ordered by your doctor      Fill your Medicine before you run out      Exercise       Enjoy walking in the sunlight daily  Keep your scheduled clinic appointments      Keep you scheduled yearly wellness visits .DASH Meal Plan  (Healthy eating plan)    Why use this meal plan?    This healthy meal plan lowers blood pressure.  This meal plan lowers the chance of you getting Diabetes, heart disease and stroke.  This meal plan also helps you lose weight.    DASH balanced meal plannin or more servings of fruits and vegetables every day.  At each meal, try to fill half of your plated with fruits and vegetables                    Eat 6-8 servings of whole grains every day.  Whole grains are:  Brown rice, oatmeal, whole wheat bread, whole grain, low salt cereals, Belkis bread, whole wheat flour tortillas                5 ounces of meat, chicken, or fish each day (3-ounce size of serving of meat is the same size as a deck of playing cards)  Fish like sardines, salmon and mackerel are also good for your heart.            2 servings of low-fat dairy each day (Milk, cottage cheese, yogurt)          Do Not:  Use More than 1,500 milligrams of salt a day if you have high blood pressure (2/3 teaspoon)    You would look at labels and total milligrams of salt per day        Do Not Add salt when cooking      Do Not Salt food before eating    Do Not Eat fried foods  Do Not Cook using butter, stick margarine, lard, shortening, coconut oil    Do Not Buy regular canned vegetables, pickles, or olives (too much salt; use low salt or no salt)  Do Not Do not buy premade or frozen meals    Do Not Eat fast food/buffet           Speak with your Doctor/Nurse practitioner about exercising 30 minutes a day          Modified from DASH eating plan education

## 2022-10-10 NOTE — PROGRESS NOTES
Spoke with patient,reports she is feeling better after starting Rx medications given by the ED provider. Recent ED visit on yesterday 10/09/22 for SOB/pneumonia. Mentioned she also had problems with lower extremities swelling and she said she was told it was her new B/P med causing it. States she has an appointment next week 10/19/22 with Dr Antonette Arias to establish care with a PCP. Stressed the importance of getting a PCP who she can see on a regular basis for better management.  Says she has enough meds until she sees the PCP next week. Instructed to watch her salt intake  and follow a low salt diet.Advised to utilized urgent care for any non acute issues. Will mailed transportation resources in the event her friend can't bring her to her appointments. Agreed to [facility] enrollment.

## 2022-11-29 ENCOUNTER — PATIENT OUTREACH (OUTPATIENT)
Dept: EMERGENCY MEDICINE | Facility: HOSPITAL | Age: 47
End: 2022-11-29
Payer: MEDICAID

## 2022-11-29 NOTE — PROGRESS NOTES
Received a phone call from [facility], patient was scheduled for 12/14/22 with Rosibel Rendon NP to establish care with a PCP. She was previously a patient of Dayan Shore NP [facility] last year but failed to follow up and no showed for appointments.

## 2022-11-29 NOTE — PATIENT INSTRUCTIONS
Why Should I Have My Own Doctor or Nurse Practitioner (PCP) to Take Care of Me  What is a PCP (Primary Care Provider)?    A primary care provider is a doctor or nurse practitioner who you can call for an appointment and will see you when you are sick.    You will also be seen at scheduled appointment times during the year to check on your diabetes, or high blood pressure, or heart disease.    Why see the same PCP (doctor/nurse practitioner)?    You can be seen faster when you are sick           You, the PCP (doctor/nurse practitioner) and the office staff get to know each other; you begin to trust them to care for you. You take part in your health choices.   All of you together are a team.    Your medicine is looked at every time you visit, to be sure you are taking the medicine, as the PCP (doctor/nurse practitioner) ordered.    Your PCP (doctor/nurse practitioner) and their staff help keep you healthy and out of the hospital.  They can catch sicknesses earlier by ordering tests once a year to stop or prevent the sickness from getting worse.      Your PCP (doctor/nurse practitioner) can send you to providers who specialize (heart/bone/lung) if you need.  They and their office staff help keep track of your seeing other providers (doctors/nurse practitioners) and tests (CT/ MRIs/ X Rays)) taken.        PCPs want you to stay healthy.  Let us care for you.              What Do I Do If I Wake Up Sick                                                     If you wake up sick, or you start to fill sick during the day, try these tips to get care and start to feel better soon.                                                                                                                              As soon as you start to feel bad, call your doctor's office and ask for same day or next day visit appointment.        If you cannot be seen with your doctor's office within 24 hours, you can go to an urgent care and be  seen.          How to stay well:     Take your medicine as ordered by your doctor      Fill your Medicine before you run out      Exercise       Enjoy walking in the sunlight daily  Keep your scheduled clinic appointments      Keep you scheduled yearly wellness visits   DASH Meal Plan  (Healthy eating plan)    Why use this meal plan?    This healthy meal plan lowers blood pressure.  This meal plan lowers the chance of you getting Diabetes, heart disease and stroke.  This meal plan also helps you lose weight.    DASH balanced meal plannin or more servings of fruits and vegetables every day.  At each meal, try to fill half of your plated with fruits and vegetables                    Eat 6-8 servings of whole grains every day.  Whole grains are:  Brown rice, oatmeal, whole wheat bread, whole grain, low salt cereals, Belkis bread, whole wheat flour tortillas                5 ounces of meat, chicken, or fish each day (3-ounce size of serving of meat is the same size as a deck of playing cards)  Fish like sardines, salmon and mackerel are also good for your heart.            2 servings of low-fat dairy each day (Milk, cottage cheese, yogurt)          Do Not:  Use More than 1,500 milligrams of salt a day if you have high blood pressure (2/3 teaspoon)    You would look at labels and total milligrams of salt per day        Do Not Add salt when cooking      Do Not Salt food before eating    Do Not Eat fried foods  Do Not Cook using butter, stick margarine, lard, shortening, coconut oil    Do Not Buy regular canned vegetables, pickles, or olives (too much salt; use low salt or no salt)  Do Not Do not buy premade or frozen meals    Do Not Eat fast food/buffet           Speak with your Doctor/Nurse practitioner about exercising 30 minutes a day          Modified from DASH eating plan education

## 2022-11-29 NOTE — PROGRESS NOTES
Called and spoke with patient,states she didn't go see Dr Arias on 10/19/22 . Says she was told her office is all the way in [place] and she feels it too far. Offered to assist her in getting a PCP at [facility]. Denies any more issues with her legs swelling. When questioned if she has enough B/P medication until her she sees the PCP,she stated that she got some B/P medicine from a friend who has the same B/P medicine and dosage which she took. Advised her against doing that and the risk of it. Instructed her to go to urgent care for B/P refills until doctor appointment. Informed patient the [facility] will contact her with an appointment. Stressed the danger of taking someone else's Rx B/P meds and that it is not a good practice to continue doing this.Explained the benefits again of having a doctor who she can see for scheduled non acute visits for better management of her healthcare needs. Reminded to utilized urgent care for for non urgent issues until PCP appointment and to follow a low salt diet.  Voices understanding.

## 2023-01-26 ENCOUNTER — HOSPITAL ENCOUNTER (INPATIENT)
Facility: HOSPITAL | Age: 48
LOS: 2 days | Discharge: HOME | DRG: 247 | End: 2023-01-28
Attending: EMERGENCY MEDICINE | Admitting: INTERNAL MEDICINE
Payer: MEDICAID

## 2023-01-26 DIAGNOSIS — R07.9 CHEST PAIN: ICD-10-CM

## 2023-01-26 DIAGNOSIS — I25.10 CAD (CORONARY ARTERY DISEASE): ICD-10-CM

## 2023-01-26 DIAGNOSIS — I21.4 NSTEMI (NON-ST ELEVATED MYOCARDIAL INFARCTION): Primary | ICD-10-CM

## 2023-01-26 LAB
ALBUMIN SERPL-MCNC: 3.8 G/DL (ref 3.5–5)
ALBUMIN/GLOB SERPL: 1.1 RATIO (ref 1.1–2)
ALP SERPL-CCNC: 136 UNIT/L (ref 40–150)
ALT SERPL-CCNC: 9 UNIT/L (ref 0–55)
APPEARANCE UR: ABNORMAL
APTT PPP: 29.8 SECONDS (ref 23.4–33.9)
APTT PPP: 51.2 SECONDS (ref 23.4–33.9)
AST SERPL-CCNC: 13 UNIT/L (ref 5–34)
B-HCG SERPL QL: NEGATIVE
BACTERIA #/AREA URNS AUTO: ABNORMAL /HPF
BASOPHILS # BLD AUTO: 0.05 X10(3)/MCL (ref 0–0.2)
BASOPHILS # BLD AUTO: 0.06 X10(3)/MCL (ref 0–0.2)
BASOPHILS NFR BLD AUTO: 0.5 %
BASOPHILS NFR BLD AUTO: 0.6 %
BILIRUB UR QL STRIP.AUTO: NEGATIVE MG/DL
BILIRUBIN DIRECT+TOT PNL SERPL-MCNC: 0.2 MG/DL
BNP BLD-MCNC: 54.5 PG/ML
BUN SERPL-MCNC: 14.7 MG/DL (ref 7–18.7)
CALCIUM SERPL-MCNC: 9.4 MG/DL (ref 8.4–10.2)
CAOX CRY URNS QL MICRO: ABNORMAL /HPF
CHLORIDE SERPL-SCNC: 107 MMOL/L (ref 98–107)
CO2 SERPL-SCNC: 25 MMOL/L (ref 22–29)
COLOR UR AUTO: YELLOW
CREAT SERPL-MCNC: 0.88 MG/DL (ref 0.55–1.02)
D DIMER PPP IA.FEU-MCNC: 0.29 UG/ML FEU (ref 0–0.5)
EOSINOPHIL # BLD AUTO: 0.17 X10(3)/MCL (ref 0–0.9)
EOSINOPHIL # BLD AUTO: 0.18 X10(3)/MCL (ref 0–0.9)
EOSINOPHIL NFR BLD AUTO: 1.7 %
EOSINOPHIL NFR BLD AUTO: 1.8 %
ERYTHROCYTE [DISTWIDTH] IN BLOOD BY AUTOMATED COUNT: 13.8 % (ref 11.5–17)
ERYTHROCYTE [DISTWIDTH] IN BLOOD BY AUTOMATED COUNT: 14 % (ref 11.5–17)
GFR SERPLBLD CREATININE-BSD FMLA CKD-EPI: >60 MLS/MIN/1.73/M2
GLOBULIN SER-MCNC: 3.4 GM/DL (ref 2.4–3.5)
GLUCOSE SERPL-MCNC: 165 MG/DL (ref 74–100)
GLUCOSE UR QL STRIP.AUTO: NEGATIVE MG/DL
HCT VFR BLD AUTO: 49.4 % (ref 37–47)
HCT VFR BLD AUTO: 51.6 % (ref 37–47)
HGB BLD-MCNC: 15.8 GM/DL (ref 12–16)
HGB BLD-MCNC: 16.3 GM/DL (ref 12–16)
IMM GRANULOCYTES # BLD AUTO: 0.02 X10(3)/MCL (ref 0–0.04)
IMM GRANULOCYTES # BLD AUTO: 0.03 X10(3)/MCL (ref 0–0.04)
IMM GRANULOCYTES NFR BLD AUTO: 0.2 %
IMM GRANULOCYTES NFR BLD AUTO: 0.3 %
INR BLD: 1 (ref 2–3)
KETONES UR QL STRIP.AUTO: NEGATIVE MG/DL
LEUKOCYTE ESTERASE UR QL STRIP.AUTO: NEGATIVE UNIT/L
LIPASE SERPL-CCNC: 15 U/L
LYMPHOCYTES # BLD AUTO: 1.82 X10(3)/MCL (ref 0.6–4.6)
LYMPHOCYTES # BLD AUTO: 2.12 X10(3)/MCL (ref 0.6–4.6)
LYMPHOCYTES NFR BLD AUTO: 17.7 %
LYMPHOCYTES NFR BLD AUTO: 20.8 %
MCH RBC QN AUTO: 30.6 PG
MCH RBC QN AUTO: 31.3 PG
MCHC RBC AUTO-ENTMCNC: 31.6 MG/DL (ref 33–36)
MCHC RBC AUTO-ENTMCNC: 32 MG/DL (ref 33–36)
MCV RBC AUTO: 97 FL (ref 80–94)
MCV RBC AUTO: 98 FL (ref 80–94)
MONOCYTES # BLD AUTO: 0.49 X10(3)/MCL (ref 0.1–1.3)
MONOCYTES # BLD AUTO: 0.51 X10(3)/MCL (ref 0.1–1.3)
MONOCYTES NFR BLD AUTO: 4.8 %
MONOCYTES NFR BLD AUTO: 5 %
MUCOUS THREADS URNS QL MICRO: ABNORMAL /LPF
NEUTROPHILS # BLD AUTO: 7.32 X10(3)/MCL (ref 2.1–9.2)
NEUTROPHILS # BLD AUTO: 7.72 X10(3)/MCL (ref 2.1–9.2)
NEUTROPHILS NFR BLD AUTO: 71.6 %
NEUTROPHILS NFR BLD AUTO: 75 %
NITRITE UR QL STRIP.AUTO: NEGATIVE
NRBC BLD AUTO-RTO: 0 %
NRBC BLD AUTO-RTO: 0 %
PH UR STRIP.AUTO: 5.5 [PH]
PLATELET # BLD AUTO: 290 X10(3)/MCL (ref 130–400)
PLATELET # BLD AUTO: 296 X10(3)/MCL (ref 130–400)
PMV BLD AUTO: 10 FL (ref 7.4–10.4)
PMV BLD AUTO: 10 FL (ref 7.4–10.4)
POTASSIUM SERPL-SCNC: 5.3 MMOL/L (ref 3.5–5.1)
POTASSIUM SERPL-SCNC: 6 MMOL/L (ref 3.5–5.1)
PROT SERPL-MCNC: 7.2 GM/DL (ref 6.4–8.3)
PROT UR QL STRIP.AUTO: ABNORMAL MG/DL
PROTHROMBIN TIME: 13 SECONDS (ref 11.7–14.5)
RBC # BLD AUTO: 5.04 X10(6)/MCL (ref 4.2–5.4)
RBC # BLD AUTO: 5.32 X10(6)/MCL (ref 4.2–5.4)
RBC #/AREA URNS AUTO: ABNORMAL /HPF
RBC UR QL AUTO: ABNORMAL UNIT/L
SODIUM SERPL-SCNC: 140 MMOL/L (ref 136–145)
SP GR UR STRIP.AUTO: >=1.03
SQUAMOUS #/AREA URNS AUTO: ABNORMAL /HPF
TROPONIN I SERPL-MCNC: 0.46 NG/ML (ref 0–0.04)
TROPONIN I SERPL-MCNC: 0.69 NG/ML (ref 0–0.04)
TROPONIN I SERPL-MCNC: 1.32 NG/ML (ref 0–0.04)
UROBILINOGEN UR STRIP-ACNC: 0.2 MG/DL
WBC # SPEC AUTO: 10.2 X10(3)/MCL (ref 4.5–11.5)
WBC # SPEC AUTO: 10.3 X10(3)/MCL (ref 4.5–11.5)
WBC #/AREA URNS AUTO: ABNORMAL /HPF

## 2023-01-26 PROCEDURE — 84484 ASSAY OF TROPONIN QUANT: CPT | Performed by: EMERGENCY MEDICINE

## 2023-01-26 PROCEDURE — 81025 URINE PREGNANCY TEST: CPT | Performed by: EMERGENCY MEDICINE

## 2023-01-26 PROCEDURE — 25000003 PHARM REV CODE 250: Performed by: INTERNAL MEDICINE

## 2023-01-26 PROCEDURE — S4991 NICOTINE PATCH NONLEGEND: HCPCS | Performed by: INTERNAL MEDICINE

## 2023-01-26 PROCEDURE — 85379 FIBRIN DEGRADATION QUANT: CPT | Performed by: EMERGENCY MEDICINE

## 2023-01-26 PROCEDURE — 83690 ASSAY OF LIPASE: CPT | Performed by: EMERGENCY MEDICINE

## 2023-01-26 PROCEDURE — 93010 EKG 12-LEAD: ICD-10-PCS | Mod: ,,, | Performed by: INTERNAL MEDICINE

## 2023-01-26 PROCEDURE — 85025 COMPLETE CBC W/AUTO DIFF WBC: CPT | Performed by: EMERGENCY MEDICINE

## 2023-01-26 PROCEDURE — 63600175 PHARM REV CODE 636 W HCPCS: Performed by: EMERGENCY MEDICINE

## 2023-01-26 PROCEDURE — 93010 ELECTROCARDIOGRAM REPORT: CPT | Mod: ,,, | Performed by: INTERNAL MEDICINE

## 2023-01-26 PROCEDURE — 80053 COMPREHEN METABOLIC PANEL: CPT | Performed by: EMERGENCY MEDICINE

## 2023-01-26 PROCEDURE — 85610 PROTHROMBIN TIME: CPT | Performed by: EMERGENCY MEDICINE

## 2023-01-26 PROCEDURE — 84132 ASSAY OF SERUM POTASSIUM: CPT | Performed by: EMERGENCY MEDICINE

## 2023-01-26 PROCEDURE — 93005 ELECTROCARDIOGRAM TRACING: CPT

## 2023-01-26 PROCEDURE — 11000001 HC ACUTE MED/SURG PRIVATE ROOM

## 2023-01-26 PROCEDURE — 85730 THROMBOPLASTIN TIME PARTIAL: CPT | Performed by: EMERGENCY MEDICINE

## 2023-01-26 PROCEDURE — 83880 ASSAY OF NATRIURETIC PEPTIDE: CPT | Performed by: EMERGENCY MEDICINE

## 2023-01-26 PROCEDURE — 81001 URINALYSIS AUTO W/SCOPE: CPT | Performed by: EMERGENCY MEDICINE

## 2023-01-26 PROCEDURE — 96374 THER/PROPH/DIAG INJ IV PUSH: CPT

## 2023-01-26 PROCEDURE — 25000003 PHARM REV CODE 250: Performed by: EMERGENCY MEDICINE

## 2023-01-26 PROCEDURE — 99285 EMERGENCY DEPT VISIT HI MDM: CPT | Mod: 25

## 2023-01-26 RX ORDER — HYDROMORPHONE HYDROCHLORIDE 2 MG/ML
0.5 INJECTION, SOLUTION INTRAMUSCULAR; INTRAVENOUS; SUBCUTANEOUS
Status: COMPLETED | OUTPATIENT
Start: 2023-01-26 | End: 2023-01-26

## 2023-01-26 RX ORDER — HYDROMORPHONE HYDROCHLORIDE 2 MG/ML
1 INJECTION, SOLUTION INTRAMUSCULAR; INTRAVENOUS; SUBCUTANEOUS
Status: COMPLETED | OUTPATIENT
Start: 2023-01-26 | End: 2023-01-26

## 2023-01-26 RX ORDER — HYDROMORPHONE HYDROCHLORIDE 2 MG/ML
1 INJECTION, SOLUTION INTRAMUSCULAR; INTRAVENOUS; SUBCUTANEOUS EVERY 6 HOURS PRN
Status: DISCONTINUED | OUTPATIENT
Start: 2023-01-26 | End: 2023-01-27

## 2023-01-26 RX ORDER — IBUPROFEN 200 MG
1 TABLET ORAL DAILY
Status: DISCONTINUED | OUTPATIENT
Start: 2023-01-26 | End: 2023-01-28 | Stop reason: HOSPADM

## 2023-01-26 RX ORDER — HEPARIN SODIUM,PORCINE/D5W 25000/250
0-40 INTRAVENOUS SOLUTION INTRAVENOUS CONTINUOUS
Status: DISCONTINUED | OUTPATIENT
Start: 2023-01-26 | End: 2023-01-27

## 2023-01-26 RX ADMIN — HYDROMORPHONE HYDROCHLORIDE 1 MG: 2 INJECTION INTRAMUSCULAR; INTRAVENOUS; SUBCUTANEOUS at 08:01

## 2023-01-26 RX ADMIN — NICOTINE 1 PATCH: 21 PATCH, EXTENDED RELEASE TRANSDERMAL at 11:01

## 2023-01-26 RX ADMIN — HYDROMORPHONE HYDROCHLORIDE 1 MG: 2 INJECTION INTRAMUSCULAR; INTRAVENOUS; SUBCUTANEOUS at 12:01

## 2023-01-26 RX ADMIN — HYDROMORPHONE HYDROCHLORIDE 0.5 MG: 2 INJECTION INTRAMUSCULAR; INTRAVENOUS; SUBCUTANEOUS at 01:01

## 2023-01-26 RX ADMIN — HEPARIN SODIUM 12 UNITS/KG/HR: 10000 INJECTION, SOLUTION INTRAVENOUS at 12:01

## 2023-01-26 RX ADMIN — NITROGLYCERIN 1 INCH: 20 OINTMENT TOPICAL at 12:01

## 2023-01-26 NOTE — ED PROVIDER NOTES
Encounter Date: 1/26/2023       History     Chief Complaint   Patient presents with    Chest Pain     Pt c/o chestpain onset yesterday, along with nausea and vomiting. States pain worse with movement. Pt also c/o epigstric burning.     Patient is a 46 yo F presenting with chest pain, started yesterday, intermittent but  persistent today. Best localized to the left breast and radiating to the lower mid to epigastric region. Described as a burning type pain, exacerbated by movement, deep inspiration. She has chronic sob that is about at her baseline. She also has some chronic leg swelling with the R > L that she thinks is due to bone spur. No calf pain. Currently chest pain is about 4/10 in severity. She has a hx of breast issues that is being worked up but she missed a follow up at [facility]. For years she's had drainage from the nipples, mostly from the R breast with dark, bloody discharge. She has no cardiac history. She is smoker so she has a chronic cough. It is productive of white mucous. She drove herself here.      Review of patient's allergies indicates:   Allergen Reactions    Lisinopril     Opioids - morphine analogues Other (See Comments)     Increase heart rate    Penicillins      Other reaction(s): short of breath, rash    Penicillins Hives    Opioids - morphine analogues Palpitations     No past medical history on file. HTN, Diverticulitis  No past surgical history on file. Cholecystectomy  No family history on file.  Social History     Tobacco Use    Smoking status: Every Day     Packs/day: 1.00     Types: Cigarettes    Smokeless tobacco: Never     Review of Systems   Constitutional:  Negative for fever.   HENT:  Negative for sore throat.    Respiratory:  Negative for shortness of breath.    Cardiovascular:  Positive for chest pain.   Gastrointestinal:  Negative for nausea.   Genitourinary:  Negative for dysuria.   Musculoskeletal:  Negative for back pain.   Skin:  Negative for rash.   Neurological:   Negative for weakness.   Hematological:  Does not bruise/bleed easily.     Physical Exam     Initial Vitals [01/26/23 1029]   BP Pulse Resp Temp SpO2   (!) 176/118 101 18 98.1 °F (36.7 °C) 97 %      MAP       --         Physical Exam    Nursing note and vitals reviewed.  Constitutional: She appears well-developed and well-nourished. No distress.   HENT:   Head: Normocephalic and atraumatic.   Eyes: Conjunctivae are normal.   Neck: Neck supple.   Cardiovascular:  Normal rate, regular rhythm and normal heart sounds.           No murmur heard.  Pulmonary/Chest: Breath sounds normal. No respiratory distress. She has no wheezes. She has no rhonchi. She exhibits no tenderness.   No abnormality notified on external examination of the left breast. No fluid collection palpated, no overlying erythema. No fluid expressed from the left breast. Dark fluid expressed from the R nipple.   Abdominal: Abdomen is soft. Bowel sounds are normal. She exhibits no distension. There is abdominal tenderness (very mild epigastric ttp). There is no rebound and no guarding.   Musculoskeletal:         General: No edema. Normal range of motion.      Cervical back: Neck supple.     Neurological: She is alert and oriented to person, place, and time.   Skin: Skin is warm and dry.   Psychiatric: She has a normal mood and affect. Thought content normal.       ED Course   Procedures  Labs Reviewed   COMPREHENSIVE METABOLIC PANEL - Abnormal; Notable for the following components:       Result Value    Potassium Level 6.0 (*)     Glucose Level 165 (*)     All other components within normal limits   TROPONIN I - Abnormal; Notable for the following components:    Troponin-I 0.458 (*)     All other components within normal limits   CBC WITH DIFFERENTIAL - Abnormal; Notable for the following components:    Hct 49.4 (*)     MCV 98.0 (*)     MCHC 32.0 (*)     All other components within normal limits   URINALYSIS, REFLEX TO URINE CULTURE - Abnormal; Notable for  the following components:    Appearance, UA SL CLOUDY (*)     Protein, UA Trace (*)     Blood, UA Moderate (*)     All other components within normal limits   URINALYSIS, MICROSCOPIC - Abnormal; Notable for the following components:    Bacteria, UA Few (*)     Mucous, UA Occ (*)     Calcium Oxalate Crystals, UA Few (*)     Squamous Epithelial Cells, UA Moderate (*)     All other components within normal limits    Narrative:     Urine microscopic results may be inaccurate, <12 cc sample submitted   PROTIME-INR - Abnormal; Notable for the following components:    INR 1.00 (*)     All other components within normal limits   POTASSIUM - Abnormal; Notable for the following components:    Potassium Level 5.3 (*)     All other components within normal limits   CBC WITH DIFFERENTIAL - Abnormal; Notable for the following components:    Hgb 16.3 (*)     Hct 51.6 (*)     MCV 97.0 (*)     MCHC 31.6 (*)     All other components within normal limits   TROPONIN I - Abnormal; Notable for the following components:    Troponin-I 0.687 (*)     All other components within normal limits   TROPONIN I - Abnormal; Notable for the following components:    Troponin-I 1.325 (*)     All other components within normal limits   APTT - Abnormal; Notable for the following components:    PTT 51.2 (*)     All other components within normal limits   APTT - Abnormal; Notable for the following components:    PTT 56.2 (*)     All other components within normal limits   B-TYPE NATRIURETIC PEPTIDE - Normal   PREGNANCY TEST, URINE RAPID - Normal   LIPASE - Normal   D DIMER, QUANTITATIVE - Normal   APTT - Normal   CBC W/ AUTO DIFFERENTIAL    Narrative:     The following orders were created for panel order CBC auto differential.  Procedure                               Abnormality         Status                     ---------                               -----------         ------                     CBC with Differential[[id number]]        Abnormal             Final result                 Please view results for these tests on the individual orders.   CBC W/ AUTO DIFFERENTIAL    Narrative:     The following orders were created for panel order CBC auto differential.  Procedure                               Abnormality         Status                     ---------                               -----------         ------                     CBC with Differential[[id number]]        Abnormal            Final result                 Please view results for these tests on the individual orders.     EKG Readings: (Independently Interpreted)   EKG Interpretation 10:26 AM    Rate: 75  Rhythm: NSR  QRS: WNL  Qtc: WNL  Nonspecific T wave abnormalities present  No changes when compared to EKG from 10/2/22     ECG Results              EKG 12-lead (Final result)  Result time 01/26/23 19:30:01      Final result by Interface, Lab In [facility] (01/26/23 19:30:01)                   Narrative:    Test Reason : R07.9,    Vent. Rate : 075 BPM     Atrial Rate : 075 BPM     P-R Int : 172 ms          QRS Dur : 088 ms      QT Int : 398 ms       P-R-T Axes : 062 059 073 degrees     QTc Int : 444 ms    Normal sinus rhythm  Nonspecific T wave abnormality  Abnormal ECG  When compared with ECG of 02-OCT-2022 11:40,  No significant change was found  Confirmed by Aquiles Sandoval MD ([id number]) on 1/26/2023 7:29:51 PM    Referred By: [email or web address]   SELF           Confirmed By:Aquiles Sandoval MD                                  Imaging Results    None          Medications   0.9%  NaCl infusion (100 mLs Intravenous New Bag 1/27/23 1553)   nitroGLYCERIN 2% TD oint ointment 1 inch (1 inch Transdermal Given 1/26/23 1212)   heparin 25,000 units in dextrose 5% (100 units/ml) IV bolus from bag INITIAL BOLUS (max bolus 4000 units) (4,000 Units Intravenous Bolus from Bag 1/26/23 1225)   HYDROmorphone (PF) injection 1 mg (1 mg Intravenous Given 1/26/23 1234)   HYDROmorphone (PF) injection 0.5 mg (0.5 mg Intravenous Given 1/26/23  1340)   aspirin EC tablet 325 mg (325 mg Oral Given 1/27/23 0327)   ticagrelor tablet 180 mg (180 mg Oral Given 1/27/23 0327)                 ED Course as of 01/29/23 1859   Thu Jan 26, 2023   1208 [facility] paged for NTEMI  Started Heparin protocol  Redrawing potassium  Patient unable to take Aspirin  Nitro paste applied as patient still hypertensive  Currently pain mild 3/10 in severity [GM]   1230 BP went up (repeat 175/114 after 200/130) but patient is feeling more anxious. Dilaudid ordered for pain control.  [GM]   1239 Repaged [facility] [GM]   1247 Spoke with Elisa with [facility]. With breast pain, hypertension, and hyperkalemia, they would prefer admit to hospitalist with [facility] consult. [GM]   1249 Paging hospitalist.    Reepat /110 [GM]   1250 Repeat K 5.3 [GM]   1330 Chest pain mild right now, 1-2/10 [GM]      ED Course User Index  [GM] Melissa Mariscal MD          [facility]    History obtained by patient.   Co-morbidities and/or factors adding to the complexity or risk for the patient?: none  Differential diagnoses: Myocardial ischemia, pericarditis, pulmonary embolus, chest wall pain, pleural inflammation and pulmonary infectious causes.  Decision to obtain previous or outside records?: no  Chart Review (nursing home, outside records, CareEverywhere): no  My EKG Interpretation: see above  Labs/imaging/other tests obtained/considered (risk/benefits of testing discussed): Considered CT chest for evaluation for PE but it would be an atypical presentation and patient not presenting with SOB  Labs/tests intepretation: Consistent with NSTEMI  My independent imaging interpretation: na  Treatment/interventions, IV fluids, IV medications:Heparin, morphine, dilaudid  Complex management (IV controlled substances, went to OR, DNR, meds requiring monitoring, transfer, etc)?: See above  Workup/treatment affected by social determinants of health?: none   Consults/radiologist/EMS/social work/family discussion/alternate history:hopsitalist  and [facility]  Advanced care planning/end of life discussion: none  Review of RX medications/new RX prescribed by me?: medications reviewed  ETOH/smoking/drug cessation discussion: n/a  Time spent in critical care (in addition to E/M time mentioned above) spent on the evaluation and treatment of severe organ dysfunction, review of pertinent labs and imaging studies, discussions with consulting providers and discussions with patient/family: 52 min         Clinical Impression:   Final diagnoses:  [R07.9] Chest pain  [I21.4] NSTEMI (non-ST elevated myocardial infarction) (Primary)        ED Disposition Condition    Admit Stable                Melissa Mariscal MD  01/29/23 [id number]

## 2023-01-26 NOTE — Clinical Note
An angiography was performed of the right coronary arteries. The angiography was performed via power injection.

## 2023-01-26 NOTE — ED TRIAGE NOTES
Pt c/o chestpain onset yesterday, along with nausea and vomiting. States pain worse with movement. Pt also c/o epigstric burning.

## 2023-01-26 NOTE — Clinical Note
Diagnosis: NSTEMI (non-ST elevated myocardial infarction) [715025]  Admitting Provider:: CINDY VARGAS [[id number]]  Future Attending Provider: CINDY VARGAS [[id number]]  Reason for IP Medical Treatment  (Clinical interventions that can only be accomplish ed in the IP setting? ) :: Heparin drip, repeat troponin, cardiac consult  Estimated Length of Stay:: 2 midnights  I certify that Inpatient services for greater than or equal to 2 midnights are medically necessary:: Yes  Plans for Post-Acute care--if  anticipated (pick the single best option):: A. No post acute care anticipated at this time

## 2023-01-27 PROBLEM — I21.4 NSTEMI (NON-ST ELEVATED MYOCARDIAL INFARCTION): Status: ACTIVE | Noted: 2023-01-27

## 2023-01-27 LAB
ALBUMIN SERPL-MCNC: 3.6 G/DL (ref 3.5–5)
ALBUMIN/GLOB SERPL: 1.2 RATIO (ref 1.1–2)
ALP SERPL-CCNC: 145 UNIT/L (ref 40–150)
ALT SERPL-CCNC: 17 UNIT/L (ref 0–55)
APTT PPP: 41 SECONDS (ref 23.2–33.7)
APTT PPP: 43.4 SECONDS (ref 23.2–33.7)
APTT PPP: 56.2 SECONDS (ref 23.4–33.9)
APTT PPP: 60.6 SECONDS (ref 23.2–33.7)
AST SERPL-CCNC: 16 UNIT/L (ref 5–34)
AV INDEX (PROSTH): 0.7
AV MEAN GRADIENT: 4 MMHG
AV PEAK GRADIENT: 7 MMHG
AV VALVE AREA: 2.21 CM2
AV VELOCITY RATIO: 0.7
BASOPHILS # BLD AUTO: 0.05 X10(3)/MCL (ref 0–0.2)
BASOPHILS NFR BLD AUTO: 0.6 %
BILIRUBIN DIRECT+TOT PNL SERPL-MCNC: 0.2 MG/DL
BSA FOR ECHO PROCEDURE: 2.2 M2
BUN SERPL-MCNC: 14.5 MG/DL (ref 7–18.7)
CALCIUM SERPL-MCNC: 9.3 MG/DL (ref 8.4–10.2)
CHLORIDE SERPL-SCNC: 106 MMOL/L (ref 98–107)
CHOLEST SERPL-MCNC: 159 MG/DL
CHOLEST/HDLC SERPL: 5 {RATIO} (ref 0–5)
CO2 SERPL-SCNC: 25 MMOL/L (ref 22–29)
CREAT SERPL-MCNC: 0.79 MG/DL (ref 0.55–1.02)
DOP CALC AO PEAK VEL: 1.29 M/S
DOP CALC AO VTI: 27.1 CM
DOP CALC LVOT AREA: 3.1 CM2
DOP CALC LVOT DIAMETER: 2 CM
DOP CALC LVOT PEAK VEL: 0.9 M/S
DOP CALC LVOT STROKE VOLUME: 59.97 CM3
DOP CALC MV VTI: 26.5 CM
DOP CALCLVOT PEAK VEL VTI: 19.1 CM
E WAVE DECELERATION TIME: 254 MSEC
E/A RATIO: 0.63
E/E' RATIO: 12 M/S
EJECTION FRACTION: 57 %
EOSINOPHIL # BLD AUTO: 0.21 X10(3)/MCL (ref 0–0.9)
EOSINOPHIL NFR BLD AUTO: 2.6 %
ERYTHROCYTE [DISTWIDTH] IN BLOOD BY AUTOMATED COUNT: 13.8 % (ref 11.5–17)
EST. AVERAGE GLUCOSE BLD GHB EST-MCNC: 128.4 MG/DL
GFR SERPLBLD CREATININE-BSD FMLA CKD-EPI: >60 MLS/MIN/1.73/M2
GLOBULIN SER-MCNC: 3.1 GM/DL (ref 2.4–3.5)
GLUCOSE SERPL-MCNC: 108 MG/DL (ref 74–100)
HBA1C MFR BLD: 6.1 %
HCT VFR BLD AUTO: 45.5 % (ref 37–47)
HDLC SERPL-MCNC: 34 MG/DL (ref 35–60)
HGB BLD-MCNC: 14.6 GM/DL (ref 12–16)
IMM GRANULOCYTES # BLD AUTO: 0.03 X10(3)/MCL (ref 0–0.04)
IMM GRANULOCYTES NFR BLD AUTO: 0.4 %
LDLC SERPL CALC-MCNC: 78 MG/DL (ref 50–140)
LEFT ATRIUM SIZE: 3 CM
LEFT VENTRICLE DIASTOLIC VOLUME INDEX: 80.19 ML/M2
LEFT VENTRICLE DIASTOLIC VOLUME: 170 ML
LEFT VENTRICLE SYSTOLIC VOLUME INDEX: 50 ML/M2
LEFT VENTRICLE SYSTOLIC VOLUME: 106 ML
LV LATERAL E/E' RATIO: 12 M/S
LV SEPTAL E/E' RATIO: 12 M/S
LVOT MG: 2 MMHG
LVOT MV: 0.57 CM/S
LYMPHOCYTES # BLD AUTO: 3.59 X10(3)/MCL (ref 0.6–4.6)
LYMPHOCYTES NFR BLD AUTO: 44.5 %
MAGNESIUM SERPL-MCNC: 2 MG/DL (ref 1.6–2.6)
MCH RBC QN AUTO: 30.9 PG
MCHC RBC AUTO-ENTMCNC: 32.1 MG/DL (ref 33–36)
MCV RBC AUTO: 96.2 FL (ref 80–94)
MONOCYTES # BLD AUTO: 0.44 X10(3)/MCL (ref 0.1–1.3)
MONOCYTES NFR BLD AUTO: 5.5 %
MV MEAN GRADIENT: 1 MMHG
MV PEAK A VEL: 0.95 M/S
MV PEAK E VEL: 0.6 M/S
MV PEAK GRADIENT: 3 MMHG
MV STENOSIS PRESSURE HALF TIME: 36 MS
MV VALVE AREA BY CONTINUITY EQUATION: 2.26 CM2
MV VALVE AREA P 1/2 METHOD: 6.11 CM2
NEUTROPHILS # BLD AUTO: 3.75 X10(3)/MCL (ref 2.1–9.2)
NEUTROPHILS NFR BLD AUTO: 46.4 %
NRBC BLD AUTO-RTO: 0 %
PHOSPHATE SERPL-MCNC: 3.3 MG/DL (ref 2.3–4.7)
PLATELET # BLD AUTO: 258 X10(3)/MCL (ref 130–400)
PMV BLD AUTO: 9.7 FL (ref 7.4–10.4)
POTASSIUM SERPL-SCNC: 4.9 MMOL/L (ref 3.5–5.1)
PROT SERPL-MCNC: 6.7 GM/DL (ref 6.4–8.3)
PV PEAK VELOCITY: 0.93 CM/S
RA PRESSURE: 8 MMHG
RBC # BLD AUTO: 4.73 X10(6)/MCL (ref 4.2–5.4)
SODIUM SERPL-SCNC: 137 MMOL/L (ref 136–145)
TDI LATERAL: 0.05 M/S
TDI SEPTAL: 0.05 M/S
TDI: 0.05 M/S
TRIGL SERPL-MCNC: 236 MG/DL (ref 37–140)
TROPONIN I SERPL-MCNC: 1.53 NG/ML (ref 0–0.04)
TROPONIN I SERPL-MCNC: 1.65 NG/ML (ref 0–0.04)
TSH SERPL-ACNC: 2.81 UIU/ML (ref 0.35–4.94)
VLDLC SERPL CALC-MCNC: 47 MG/DL
WBC # SPEC AUTO: 8.1 X10(3)/MCL (ref 4.5–11.5)

## 2023-01-27 PROCEDURE — C1769 GUIDE WIRE: HCPCS | Performed by: INTERNAL MEDICINE

## 2023-01-27 PROCEDURE — C1874 STENT, COATED/COV W/DEL SYS: HCPCS | Performed by: INTERNAL MEDICINE

## 2023-01-27 PROCEDURE — 83036 HEMOGLOBIN GLYCOSYLATED A1C: CPT | Performed by: INTERNAL MEDICINE

## 2023-01-27 PROCEDURE — 63600175 PHARM REV CODE 636 W HCPCS: Performed by: INTERNAL MEDICINE

## 2023-01-27 PROCEDURE — 83735 ASSAY OF MAGNESIUM: CPT | Performed by: INTERNAL MEDICINE

## 2023-01-27 PROCEDURE — C1887 CATHETER, GUIDING: HCPCS | Performed by: INTERNAL MEDICINE

## 2023-01-27 PROCEDURE — C1753 CATH, INTRAVAS ULTRASOUND: HCPCS | Performed by: INTERNAL MEDICINE

## 2023-01-27 PROCEDURE — 85730 THROMBOPLASTIN TIME PARTIAL: CPT | Performed by: INTERNAL MEDICINE

## 2023-01-27 PROCEDURE — 99152 MOD SED SAME PHYS/QHP 5/>YRS: CPT | Performed by: INTERNAL MEDICINE

## 2023-01-27 PROCEDURE — C1894 INTRO/SHEATH, NON-LASER: HCPCS | Performed by: INTERNAL MEDICINE

## 2023-01-27 PROCEDURE — C1725 CATH, TRANSLUMIN NON-LASER: HCPCS | Performed by: INTERNAL MEDICINE

## 2023-01-27 PROCEDURE — 92978 ENDOLUMINL IVUS OCT C 1ST: CPT | Mod: RC | Performed by: INTERNAL MEDICINE

## 2023-01-27 PROCEDURE — 93005 ELECTROCARDIOGRAM TRACING: CPT

## 2023-01-27 PROCEDURE — 93010 ELECTROCARDIOGRAM REPORT: CPT | Mod: ,,, | Performed by: INTERNAL MEDICINE

## 2023-01-27 PROCEDURE — 93010 EKG 12-LEAD: ICD-10-PCS | Mod: 76,,, | Performed by: INTERNAL MEDICINE

## 2023-01-27 PROCEDURE — 84443 ASSAY THYROID STIM HORMONE: CPT | Performed by: INTERNAL MEDICINE

## 2023-01-27 PROCEDURE — 27201423 OPTIME MED/SURG SUP & DEVICES STERILE SUPPLY: Performed by: INTERNAL MEDICINE

## 2023-01-27 PROCEDURE — 25000003 PHARM REV CODE 250: Performed by: INTERNAL MEDICINE

## 2023-01-27 PROCEDURE — C9600 PERC DRUG-EL COR STENT SING: HCPCS | Mod: RC | Performed by: INTERNAL MEDICINE

## 2023-01-27 PROCEDURE — 84484 ASSAY OF TROPONIN QUANT: CPT | Performed by: EMERGENCY MEDICINE

## 2023-01-27 PROCEDURE — 11000001 HC ACUTE MED/SURG PRIVATE ROOM

## 2023-01-27 PROCEDURE — 25500020 PHARM REV CODE 255: Performed by: INTERNAL MEDICINE

## 2023-01-27 PROCEDURE — 85025 COMPLETE CBC W/AUTO DIFF WBC: CPT | Performed by: EMERGENCY MEDICINE

## 2023-01-27 PROCEDURE — 93010 ELECTROCARDIOGRAM REPORT: CPT | Mod: 76,,, | Performed by: INTERNAL MEDICINE

## 2023-01-27 PROCEDURE — 85730 THROMBOPLASTIN TIME PARTIAL: CPT | Performed by: EMERGENCY MEDICINE

## 2023-01-27 PROCEDURE — 93458 L HRT ARTERY/VENTRICLE ANGIO: CPT | Mod: 59 | Performed by: INTERNAL MEDICINE

## 2023-01-27 PROCEDURE — 80061 LIPID PANEL: CPT | Performed by: INTERNAL MEDICINE

## 2023-01-27 PROCEDURE — 25000003 PHARM REV CODE 250: Performed by: NURSE PRACTITIONER

## 2023-01-27 PROCEDURE — 84100 ASSAY OF PHOSPHORUS: CPT | Performed by: INTERNAL MEDICINE

## 2023-01-27 PROCEDURE — 80053 COMPREHEN METABOLIC PANEL: CPT | Performed by: INTERNAL MEDICINE

## 2023-01-27 PROCEDURE — 27000221 HC OXYGEN, UP TO 24 HOURS

## 2023-01-27 PROCEDURE — 99153 MOD SED SAME PHYS/QHP EA: CPT | Performed by: INTERNAL MEDICINE

## 2023-01-27 PROCEDURE — S4991 NICOTINE PATCH NONLEGEND: HCPCS | Performed by: INTERNAL MEDICINE

## 2023-01-27 PROCEDURE — 21400001 HC TELEMETRY ROOM

## 2023-01-27 DEVICE — EVEROLIMUS-ELUTING PLATINUM CHROMIUM CORONARY STENT SYSTEM
Type: IMPLANTABLE DEVICE | Site: CORONARY | Status: FUNCTIONAL
Brand: SYNERGY™ XD

## 2023-01-27 RX ORDER — SODIUM CHLORIDE 0.9 % (FLUSH) 0.9 %
10 SYRINGE (ML) INJECTION
Status: DISCONTINUED | OUTPATIENT
Start: 2023-01-27 | End: 2023-01-28 | Stop reason: HOSPADM

## 2023-01-27 RX ORDER — POLYETHYLENE GLYCOL 3350 17 G/17G
17 POWDER, FOR SOLUTION ORAL 2 TIMES DAILY PRN
Status: DISCONTINUED | OUTPATIENT
Start: 2023-01-27 | End: 2023-01-28 | Stop reason: HOSPADM

## 2023-01-27 RX ORDER — DIPHENHYDRAMINE HYDROCHLORIDE 50 MG/ML
INJECTION INTRAMUSCULAR; INTRAVENOUS
Status: DISCONTINUED | OUTPATIENT
Start: 2023-01-27 | End: 2023-01-28 | Stop reason: HOSPADM

## 2023-01-27 RX ORDER — ONDANSETRON 2 MG/ML
4 INJECTION INTRAMUSCULAR; INTRAVENOUS EVERY 4 HOURS PRN
Status: DISCONTINUED | OUTPATIENT
Start: 2023-01-27 | End: 2023-01-28 | Stop reason: HOSPADM

## 2023-01-27 RX ORDER — ASPIRIN 81 MG/1
81 TABLET ORAL DAILY
Status: DISCONTINUED | OUTPATIENT
Start: 2023-01-27 | End: 2023-01-28 | Stop reason: HOSPADM

## 2023-01-27 RX ORDER — ACETAMINOPHEN 325 MG/1
650 TABLET ORAL EVERY 4 HOURS PRN
Status: DISCONTINUED | OUTPATIENT
Start: 2023-01-27 | End: 2023-01-28 | Stop reason: HOSPADM

## 2023-01-27 RX ORDER — HEPARIN SODIUM 1000 [USP'U]/ML
INJECTION, SOLUTION INTRAVENOUS; SUBCUTANEOUS
Status: DISCONTINUED | OUTPATIENT
Start: 2023-01-27 | End: 2023-01-28 | Stop reason: HOSPADM

## 2023-01-27 RX ORDER — LABETALOL HYDROCHLORIDE 5 MG/ML
10 INJECTION, SOLUTION INTRAVENOUS EVERY 4 HOURS PRN
Status: DISCONTINUED | OUTPATIENT
Start: 2023-01-27 | End: 2023-01-28 | Stop reason: HOSPADM

## 2023-01-27 RX ORDER — NITROGLYCERIN 0.4 MG/1
0.4 TABLET SUBLINGUAL EVERY 5 MIN PRN
Status: DISCONTINUED | OUTPATIENT
Start: 2023-01-27 | End: 2023-01-28 | Stop reason: HOSPADM

## 2023-01-27 RX ORDER — HEPARIN SODIUM,PORCINE/D5W 25000/250
12 INTRAVENOUS SOLUTION INTRAVENOUS CONTINUOUS
Status: DISCONTINUED | OUTPATIENT
Start: 2023-01-27 | End: 2023-01-28 | Stop reason: HOSPADM

## 2023-01-27 RX ORDER — HYDRALAZINE HYDROCHLORIDE 20 MG/ML
20 INJECTION INTRAMUSCULAR; INTRAVENOUS EVERY 4 HOURS PRN
Status: DISCONTINUED | OUTPATIENT
Start: 2023-01-27 | End: 2023-01-28 | Stop reason: HOSPADM

## 2023-01-27 RX ORDER — TALC
6 POWDER (GRAM) TOPICAL NIGHTLY PRN
Status: DISCONTINUED | OUTPATIENT
Start: 2023-01-27 | End: 2023-01-28 | Stop reason: HOSPADM

## 2023-01-27 RX ORDER — METOPROLOL SUCCINATE 50 MG/1
50 TABLET, EXTENDED RELEASE ORAL DAILY
Status: DISCONTINUED | OUTPATIENT
Start: 2023-01-27 | End: 2023-01-28 | Stop reason: HOSPADM

## 2023-01-27 RX ORDER — PROCHLORPERAZINE EDISYLATE 5 MG/ML
5 INJECTION INTRAMUSCULAR; INTRAVENOUS EVERY 6 HOURS PRN
Status: DISCONTINUED | OUTPATIENT
Start: 2023-01-27 | End: 2023-01-28 | Stop reason: HOSPADM

## 2023-01-27 RX ORDER — ATORVASTATIN CALCIUM 40 MG/1
40 TABLET, FILM COATED ORAL DAILY
Status: DISCONTINUED | OUTPATIENT
Start: 2023-01-27 | End: 2023-01-28 | Stop reason: HOSPADM

## 2023-01-27 RX ORDER — LOSARTAN POTASSIUM 25 MG/1
25 TABLET ORAL DAILY
Status: DISCONTINUED | OUTPATIENT
Start: 2023-01-28 | End: 2023-01-28 | Stop reason: HOSPADM

## 2023-01-27 RX ORDER — SODIUM CHLORIDE 9 MG/ML
100 INJECTION, SOLUTION INTRAVENOUS CONTINUOUS
Status: ACTIVE | OUTPATIENT
Start: 2023-01-27 | End: 2023-01-27

## 2023-01-27 RX ORDER — AMLODIPINE BESYLATE 5 MG/1
10 TABLET ORAL DAILY
Status: DISCONTINUED | OUTPATIENT
Start: 2023-01-27 | End: 2023-01-28 | Stop reason: HOSPADM

## 2023-01-27 RX ORDER — AMOXICILLIN 250 MG
2 CAPSULE ORAL 2 TIMES DAILY PRN
Status: DISCONTINUED | OUTPATIENT
Start: 2023-01-27 | End: 2023-01-28 | Stop reason: HOSPADM

## 2023-01-27 RX ORDER — HEPARIN SODIUM,PORCINE/D5W 25000/250
0-40 INTRAVENOUS SOLUTION INTRAVENOUS CONTINUOUS
Status: DISCONTINUED | OUTPATIENT
Start: 2023-01-27 | End: 2023-01-27

## 2023-01-27 RX ORDER — SIMETHICONE 80 MG
1 TABLET,CHEWABLE ORAL 4 TIMES DAILY PRN
Status: DISCONTINUED | OUTPATIENT
Start: 2023-01-27 | End: 2023-01-28 | Stop reason: HOSPADM

## 2023-01-27 RX ORDER — NITROGLYCERIN 20 MG/100ML
INJECTION INTRAVENOUS
Status: DISCONTINUED | OUTPATIENT
Start: 2023-01-27 | End: 2023-01-28 | Stop reason: HOSPADM

## 2023-01-27 RX ORDER — FLUOXETINE HYDROCHLORIDE 20 MG/1
20 CAPSULE ORAL DAILY
Status: DISCONTINUED | OUTPATIENT
Start: 2023-01-27 | End: 2023-01-28 | Stop reason: HOSPADM

## 2023-01-27 RX ORDER — TRAZODONE HYDROCHLORIDE 100 MG/1
100 TABLET ORAL NIGHTLY
Status: DISCONTINUED | OUTPATIENT
Start: 2023-01-27 | End: 2023-01-28 | Stop reason: HOSPADM

## 2023-01-27 RX ORDER — FAMOTIDINE 20 MG/1
20 TABLET, FILM COATED ORAL 2 TIMES DAILY
Status: DISCONTINUED | OUTPATIENT
Start: 2023-01-27 | End: 2023-01-28 | Stop reason: HOSPADM

## 2023-01-27 RX ORDER — GABAPENTIN 300 MG/1
300 CAPSULE ORAL 3 TIMES DAILY
Status: DISCONTINUED | OUTPATIENT
Start: 2023-01-27 | End: 2023-01-28 | Stop reason: HOSPADM

## 2023-01-27 RX ORDER — FENTANYL CITRATE 50 UG/ML
INJECTION, SOLUTION INTRAMUSCULAR; INTRAVENOUS
Status: DISCONTINUED | OUTPATIENT
Start: 2023-01-27 | End: 2023-01-28 | Stop reason: HOSPADM

## 2023-01-27 RX ORDER — BUSPIRONE HYDROCHLORIDE 5 MG/1
15 TABLET ORAL 3 TIMES DAILY
Status: DISCONTINUED | OUTPATIENT
Start: 2023-01-27 | End: 2023-01-28 | Stop reason: HOSPADM

## 2023-01-27 RX ORDER — ALBUTEROL SULFATE 90 UG/1
2 AEROSOL, METERED RESPIRATORY (INHALATION) EVERY 4 HOURS PRN
Status: DISCONTINUED | OUTPATIENT
Start: 2023-01-27 | End: 2023-01-28 | Stop reason: HOSPADM

## 2023-01-27 RX ORDER — LIDOCAINE HYDROCHLORIDE 10 MG/ML
INJECTION INFILTRATION; PERINEURAL
Status: DISCONTINUED | OUTPATIENT
Start: 2023-01-27 | End: 2023-01-28 | Stop reason: HOSPADM

## 2023-01-27 RX ORDER — MAG HYDROX/ALUMINUM HYD/SIMETH 200-200-20
30 SUSPENSION, ORAL (FINAL DOSE FORM) ORAL 4 TIMES DAILY PRN
Status: DISCONTINUED | OUTPATIENT
Start: 2023-01-27 | End: 2023-01-28 | Stop reason: HOSPADM

## 2023-01-27 RX ORDER — MIDAZOLAM HYDROCHLORIDE 1 MG/ML
INJECTION INTRAMUSCULAR; INTRAVENOUS
Status: DISCONTINUED | OUTPATIENT
Start: 2023-01-27 | End: 2023-01-28 | Stop reason: HOSPADM

## 2023-01-27 RX ORDER — VERAPAMIL HYDROCHLORIDE 2.5 MG/ML
INJECTION, SOLUTION INTRAVENOUS
Status: DISCONTINUED | OUTPATIENT
Start: 2023-01-27 | End: 2023-01-28 | Stop reason: HOSPADM

## 2023-01-27 RX ORDER — ACETAMINOPHEN 500 MG
1000 TABLET ORAL EVERY 6 HOURS PRN
Status: DISCONTINUED | OUTPATIENT
Start: 2023-01-27 | End: 2023-01-28 | Stop reason: HOSPADM

## 2023-01-27 RX ORDER — ASPIRIN 325 MG
325 TABLET, DELAYED RELEASE (ENTERIC COATED) ORAL ONCE
Status: COMPLETED | OUTPATIENT
Start: 2023-01-27 | End: 2023-01-27

## 2023-01-27 RX ORDER — CLONIDINE HYDROCHLORIDE 0.2 MG/1
0.2 TABLET ORAL 3 TIMES DAILY PRN
Status: DISCONTINUED | OUTPATIENT
Start: 2023-01-27 | End: 2023-01-28 | Stop reason: HOSPADM

## 2023-01-27 RX ADMIN — TICAGRELOR 90 MG: 90 TABLET ORAL at 09:01

## 2023-01-27 RX ADMIN — ACETAMINOPHEN 1000 MG: 500 TABLET ORAL at 06:01

## 2023-01-27 RX ADMIN — ASPIRIN 325 MG: 325 TABLET, COATED ORAL at 03:01

## 2023-01-27 RX ADMIN — GABAPENTIN 300 MG: 300 CAPSULE ORAL at 09:01

## 2023-01-27 RX ADMIN — GABAPENTIN 300 MG: 300 CAPSULE ORAL at 03:01

## 2023-01-27 RX ADMIN — ASPIRIN 81 MG: 81 TABLET, COATED ORAL at 08:01

## 2023-01-27 RX ADMIN — GABAPENTIN 300 MG: 300 CAPSULE ORAL at 08:01

## 2023-01-27 RX ADMIN — FAMOTIDINE 20 MG: 20 TABLET, FILM COATED ORAL at 08:01

## 2023-01-27 RX ADMIN — SODIUM CHLORIDE 100 ML: 9 INJECTION, SOLUTION INTRAVENOUS at 03:01

## 2023-01-27 RX ADMIN — NICOTINE 1 PATCH: 21 PATCH, EXTENDED RELEASE TRANSDERMAL at 08:01

## 2023-01-27 RX ADMIN — HYDRALAZINE HYDROCHLORIDE 20 MG: 20 INJECTION INTRAMUSCULAR; INTRAVENOUS at 09:01

## 2023-01-27 RX ADMIN — TICAGRELOR 180 MG: 90 TABLET ORAL at 03:01

## 2023-01-27 RX ADMIN — ATORVASTATIN CALCIUM 40 MG: 40 TABLET, FILM COATED ORAL at 08:01

## 2023-01-27 RX ADMIN — FAMOTIDINE 20 MG: 20 TABLET, FILM COATED ORAL at 09:01

## 2023-01-27 RX ADMIN — BUSPIRONE HYDROCHLORIDE 15 MG: 5 TABLET ORAL at 08:01

## 2023-01-27 RX ADMIN — HEPARIN SODIUM 16 UNITS/KG/HR: 10000 INJECTION, SOLUTION INTRAVENOUS at 02:01

## 2023-01-27 RX ADMIN — AMLODIPINE BESYLATE 10 MG: 5 TABLET ORAL at 08:01

## 2023-01-27 RX ADMIN — HEPARIN SODIUM 18 UNITS/KG/HR: 10000 INJECTION, SOLUTION INTRAVENOUS at 09:01

## 2023-01-27 RX ADMIN — METOPROLOL SUCCINATE 50 MG: 50 TABLET, EXTENDED RELEASE ORAL at 05:01

## 2023-01-27 RX ADMIN — TRAZODONE HYDROCHLORIDE 100 MG: 100 TABLET ORAL at 09:01

## 2023-01-27 NOTE — H&P
[facility] Hospital Medicine - H&P Note    Patient Name: Serge Krishnamurthy  : 1975  MRN: [id number]  PCP: No primary care provider on file.  Admitting Physician: Faby Barrera MD  Admission Class: IP- Inpatient   Length of Stay: 1  Face-to-Face encounter date: 2023  Code status: Full    Chief Complaint   Chest pain, dyspnea, nausea and diaphoresis    History of Present Illness   This is a 47-year-old female with medical history notable for obesity BMI 37, hypertension, chronic tobacco smoker, anxiety presented to [facility] ED on 2023 with complaint of chest pain for 1 day.  Described her chest pain as pressure-like, retrosternal and radiated to her throat, left chest and inner left arm, was intermittent, exacerbated with exertion and alleviated/relieved by rest and associated with diaphoresis and nausea.  Denies prior history of similar chest pain.  Has family history/mother with heart disease/bypass and valve replacement.    On arrival to ED she was afebrile, hypertensive, saturating 97% on room air.  EKG shows sinus rhythm and early T-wave inversion in anteroseptal leads.  Labs notable for potassium 6.0 > 5.3 (without intervention, possibly lab error/hemolyzed sample, normal creatinine, initial troponin 0.458 > 0.687 > 1.325.     She was given antihypertensive, Nitro-Bid, cardiology consulted and started heparin bolus and a drip, transferred to [facility] after midnight on 2023 and subsequently referred to hospital medicine service for further evaluation and management.      ROS   Except as documented, all other systems reviewed and negative     Past Medical History   Hypertension  Anxiety  Chronic back pain/radiculopathy  Opiate dependence-currently on zubsolv    Past Surgical History   Cholecystectomy    Social History     Social History     Tobacco Use    Smoking status: Every Day     Packs/day: 1.00     Types: Cigarettes    Smokeless tobacco: Never   Substance Use  Topics    Alcohol use: Not on file        Family History   Mother has heart disease-valve replacement    Allergies   Opioids - morphine analogues, Penicillins, Penicillins, and Opioids - morphine analogues    Home Medications     Prior to Admission medications    Medication Sig Start Date End Date Taking? Authorizing Provider   albuterol (VENTOLIN HFA) 90 mcg/actuation inhaler Inhale 2 puffs into the lungs every 6 (six) hours as needed for Wheezing. Rescue 10/9/22   Alec Saldana,    amLODIPine (NORVASC) 10 MG tablet Take 10 mg by mouth once daily. 7/14/22   Historical Provider   busPIRone (BUSPAR) 15 MG tablet Take 15 mg by mouth 3 (three) times daily. 7/6/22   Historical Provider   famotidine (PEPCID) 20 MG tablet Take 1 tablet (20 mg total) by mouth 2 (two) times daily. 10/2/22 11/1/22  WILIAM Moses   FLUoxetine 20 MG capsule Take 20 mg by mouth once daily. 7/6/22   Historical Provider   gabapentin (NEURONTIN) 300 MG capsule Take 300 mg by mouth 3 (three) times daily. 7/14/22   Historical Provider   traZODone (DESYREL) 100 MG tablet Take 100 mg by mouth nightly. 7/6/22   Historical Provider   ZUBSOLV 5.7-1.4 mg Subl Place 1 tablet under the tongue 2 (two) times daily. 7/6/22   Historical Provider     Physical Exam   Vital Signs  Temp:  [98 °F (36.7 °C)-98.6 °F (37 °C)]   Pulse:  [61-62]   Resp:  [18]   BP: (157)/()   SpO2:  [96 %-98 %]    General: Appears comfortable  HEENT: NC/AT  Neck:  No JVD  Chest: CTABL  CVS: Regular rhythm. Normal S1/S2.  No pedal edema  Abdomen: nondistended, normoactive BS, soft and non-tender.  MSK: No obvious deformity or joint swelling  Skin: Warm and dry  Neuro: AAOx3, no focal neurological deficit  Psych: Cooperative    Labs     Recent Labs     01/26/23  1100 01/26/23  1212   WBC 10.2 10.3   RBC 5.04 5.32   HGB 15.8 16.3*   HCT 49.4* 51.6*   MCV 98.0* 97.0*   MCH 31.3 30.6   MCHC 32.0* 31.6*   RDW 14.0 13.8    296     Recent Labs     01/26/23  1057    PROTIME 13.0   INR 1.00*   PTT 29.8   D-DIMER 0.29      Recent Labs     01/26/23  1100 01/26/23  1212     --    K 6.0* 5.3*   CHLORIDE 107  --    CO2 25  --    BUN 14.7  --    CREATININE 0.88  --    EGFRNORACEVR >60  --    GLUCOSE 165*  --    CALCIUM 9.4  --    ALBUMIN 3.8  --    GLOBULIN 3.4  --    ALKPHOS 136  --    ALT 9  --    AST 13  --    BILITOT 0.2  --    BNP 54.5  --      Recent Labs     01/26/23  1100 01/26/23  1512 01/26/23  2256   TROPONINI 0.458* 0.687* 1.325*          Assessment & Plan   ACS/NSTEMI  Hypertensive urgency    HX chronic tobacco smoker, anxiety, obesity, chronic pain, opiate dependence currently on buprenorphine-naloxone    Plan:    Trend troponin q.6 hourly until peak  Repeat EKG in a.m. or with recurrence of chest pain  Transthoracic echo  Chest x-ray - pending  Check A1c and lipid panel    Continue heparin drip-low intensity  Aspirin 325 mg x 1 then 81 mg daily  We contacted Cardiology overnight and loaded with Brilinta 180 mg and continue 90 mg twice daily  Start Atorvastatin 40 mg daily  Start metoprolol succinate 50 mg daily  PRN SL Nitro  Cardiology consulted  Keep NPO except medications/sips    PRN antihypertensives  Resume amlodipine, gabapentin, trazodone, fluoxetine, buspirone, buprenorphine-naloxone    Provided 8 minutes of smoking cessation counseling, patient showed willingness and motivation to quit, nicotine patch offered and accepted    VTE Prophylaxis:  On heparin drip  GI PPX:  Famotidine 20 mg p.o. b.i.d.      Critical care time:  35 minutes  Critical care diagnosis:  Acute coronary syndrome    Faby Barrera MD  Internal Medicine

## 2023-01-27 NOTE — PROGRESS NOTES
[facility]  Hospital Medicine Progress Note        Chief Complaint: Inpatient Follow-up for ACS/NSTEMI     HPI:   This is a 47-year-old female with medical history notable for obesity BMI 37, hypertension, chronic tobacco smoker, anxiety presented to [facility] ED on 01/26/2023 with complaint of chest pain for 1 day.  Described her chest pain as pressure-like, retrosternal and radiated to her throat, left chest and inner left arm, was intermittent, exacerbated with exertion and alleviated/relieved by rest and associated with diaphoresis and nausea.  Denies prior history of similar chest pain.  Has family history/mother with heart disease/bypass and valve replacement.     On arrival to ED she was afebrile, hypertensive, saturating 97% on room air.  EKG shows sinus rhythm and early T-wave inversion in anteroseptal leads.  Labs notable for potassium 6.0 > 5.3 (without intervention, possibly lab error/hemolyzed sample, normal creatinine, initial troponin 0.458 > 0.687 > 1.325.      She was given antihypertensive, Nitro-Bid, cardiology consulted and started on heparin bolus and a drip per ACS protocol, transferred to [facility] after midnight on 01/27/2023 and subsequently referred to hospital medicine service for further evaluation and management.    Pt received  mg x1, then 81 mg daily, loaded with Brilinta 180 mg then  90 mg twice daily, high intensity statin , BB and LHC planned for today 1/27/23.       Interval Hx:   Currently chest pain free, however c/o intermittent SOB. Heparin gtt per ACS protocol continues. Cardiology consult obtained and LHC planned today.     Objective/physical exam:  General: In no acute distress, afebrile, Obese body habitus  Chest: Clear to auscultation bilaterally, no wheezes or rhonchi appreciated   Heart: RRR, +S1, S2, no appreciable murmur  Abdomen: Soft, nontender, BS +  MSK: Warm, no lower extremity edema, no clubbing or cyanosis  Neurologic: Alert and  oriented x4, Cranial nerve II-XII intact, Strength 5/5 in all 4 extremities    VITAL SIGNS: 24 HRS MIN & MAX LAST   Temp  Min: 97.5 °F (36.4 °C)  Max: 98.6 °F (37 °C) 97.9 °F (36.6 °C)   BP  Min: 137/93  Max: 158/105 (!) 154/95     Pulse  Min: 53  Max: 85  62   Resp  Min: 18  Max: 18 18   SpO2  Min: 96 %  Max: 99 % 97 %       Recent Labs   Lab 01/26/23  1100 01/26/23  1212 01/27/23  0521   WBC 10.2 10.3 8.1   RBC 5.04 5.32 4.73   HGB 15.8 16.3* 14.6   HCT 49.4* 51.6* 45.5   MCV 98.0* 97.0* 96.2*   MCH 31.3 30.6 30.9   MCHC 32.0* 31.6* 32.1*   RDW 14.0 13.8 13.8    296 258   MPV 10.0 10.0 9.7       Recent Labs   Lab 01/26/23  1100 01/26/23  1212 01/27/23  0521     --  137   K 6.0* 5.3* 4.9   CO2 25  --  25   BUN 14.7  --  14.5   CREATININE 0.88  --  0.79   CALCIUM 9.4  --  9.3   MG  --   --  2.00   ALBUMIN 3.8  --  3.6   ALKPHOS 136  --  145   ALT 9  --  17   AST 13  --  16   BILITOT 0.2  --  0.2          Microbiology Results (last 7 days)       ** No results found for the last 168 hours. **             See below for Radiology    Scheduled Med:   amLODIPine  10 mg Oral Daily    aspirin  81 mg Oral Daily    atorvastatin  40 mg Oral Daily    buprenorphine-naloxone  1 tablet Sublingual BID    busPIRone  15 mg Oral TID    famotidine  20 mg Oral BID    FLUoxetine  20 mg Oral Daily    gabapentin  300 mg Oral TID    heparin (PORCINE)  51.8 Units/kg (Adjusted) Intravenous Once    metoprolol succinate  50 mg Oral Daily    nicotine  1 patch Transdermal Daily    perflutren lipid microspheres  1.3 mL Intravenous Once    ticagrelor  90 mg Oral BID    traZODone  100 mg Oral Nightly        Continuous Infusions:   sodium chloride 0.9% 100 mL (01/27/23 1553)    heparin (porcine) in D5W 18 Units/kg/hr (01/27/23 1000)        PRN Meds:  acetaminophen, acetaminophen, albuterol, aluminum-magnesium hydroxide-simethicone, cloNIDine, diphenhydrAMINE, fentaNYL, heparin (porcine), heparin (PORCINE), heparin (PORCINE), hydrALAZINE,  iopamidoL, labetalol, LIDOcaine HCL 10 mg/ml (1%), melatonin, midazolam, nitroGLYCERIN in dextrose 5%, nitroGLYCERIN, ondansetron, polyethylene glycol, prochlorperazine, senna-docusate 8.6-50 mg, simethicone, sodium chloride 0.9%, verapamiL       Assessment/Plan:  ACS/ NSTEMI  Hypertensive emergency   Hyperglycemia / Pre diabetes , HbA1c 6.1  Hypertriglyceridemia   Current everyday smoker / Tobacco abuse  Obesity, adult class 2 (BMI 37.02)    Plan-   Troponin peaked at 1.6  Echo showed LVEF 57%, G1DD, no WMA  Continue Heparin gtt, ASA, BB, high intensity statin   LHC planned today   Tobacco cessation   Low calorie diet, exercise and weight control     Assistance with smoking cessation was offered, including:  []  Medications  [x]  Counseling  []  Printed Information on Smoking Cessation  []  Referral to a Smoking Cessation Program    Patient was counseled regarding smoking for >10 minutes.    Critical care diagnosis:   ACD/NSTEMI requiring IV Heparin gtt     Critical care interventions: Hands-on evaluation, review of labs/radiographs/records and discussion with patient and family if present  Critical care time spent: 35 minutes            VTE prophylaxis: Heparin gtt     Patient condition:  Fair.     Anticipated discharge and Disposition:         All diagnosis and differential diagnosis have been reviewed; assessment and plan has been documented; I have personally reviewed the labs and test results that are presently available; I have reviewed the patients medication list; I have reviewed the consulting providers response and recommendations. I have reviewed or attempted to review medical records based upon their availability    All of the patient's questions have been  addressed and answered. Patient's is agreeable to the above stated plan. I will continue to monitor closely and make adjustments to medical management as needed.  _____________________________________________________________________    Nutrition  Status:    Radiology:  Cardiac catheterization  The procedure log was documented by No documenter listed and verified by   Evan Trejo MD.    Procedure:  Left heart catheterization  Bilateral selective coronary angiography  IVUS of the RCA  PCI of the RCA x1    Preoperative diagnosis:  NSTEMI    Postoperative diagnosis:  NSTEMI  CAD status post PCI    Access: Right radial artery  Estimated blood loss:  10-15 cc  Complications: None    Summary:  Consent obtained.  Risks and benefits discussed with the patient.  The   patient was brought to the cath lab in a fasting state.  Patient was   prepped and draped in usual sterile fashion.  Preprocedure time-out   performed.  Ultrasound-guided right radial access via modified Seldinger   technique with micropuncture.  A 5-6 Libyan slender sheath was inserted   into the right radial artery.  A 5 Libyan TIG catheter was used to cross   the aortic valve and LV pressures were measured.  The transvalvular aortic   gradient was measured by pullback method.  The TIG catheter was used to   cannulate the left and right coronary arteries and multiple fluoroscopic   views were obtained.  A 6 Libyan AL1 was used to cannulate the right   coronary artery.  An 0.14'' Run-through wire was used to cross the   stenotic lesion placed within the distal RCA.  The lesion was pre-dilated   with 2.5 mm compliant balloon.  IVUS performed.  A 3.5 x 48 mm synergy XT   stent was deployed.  A 4 mm NC balloon was used to post dilate the distal   portion of the stent up to 3.5 and the mid to proximal portions of the   stent up to 4 mm.  There was good stent apposition.  No perforation or   dissection and HOLLAND 3 flow within the distal vessel.  At the end of   procedure all wires and catheters were removed.  TR band for hemostasis.    Findings:  Left main coronary artery:  Patent  Left anterior descending artery:  Luminal irregularities.  Diffusely   diseased diagonal 1 branch with narrowing of up to  50-60% in the proximal   portion  Left circumflex artery:  Luminal irregularity  Right coronary artery: Dominant.  Diffusely diseased vessel with a 90%   heavily fibrotic lesion in the proximal to midportion of the vessel.    LVEDP:  26 mm Hg  No significant transvalvular aortic gradient by pullback method    Assessment:  Successful IVUS guided PCI of the RCA with a 3.5 x 48 mm Synergy XD stent,   post-dilated with a 4 mm noncompliant balloon up to 3.5 mm distally and 4   mm mid to proximally.     Plan:  -Continue dual anti-platelet therapy for minimum of 1 year, lifelong   aspirin therapy  -Optimization of CAD with guideline directed medical therapy  -Follow-up in clinic 1 week after the procedure    Date: 1/27/2023  Time: 1:57 PM  Echo  · Technically difficult study, Definity contrast is used in this study.  · The left ventricle is normal in size with normal systolic function.  · The estimated ejection fraction is 57%.  · Grade I left ventricular diastolic dysfunction.  · No regional wall motion abnormalities noted in this study.  · Mild mitral regurgitation.  · No significant valvular abnormalities noted in this study.     X-Ray Chest 1 View  Narrative: EXAMINATION:  XR CHEST 1 VIEW    CLINICAL HISTORY:  chest pain;, .    COMPARISON:  October 9, 2022    FINDINGS:  No alveolar consolidation, effusion, or pneumothorax is seen.   The thoracic aorta is normal  cardiac silhouette, central pulmonary vessels and mediastinum are normal in size and are grossly unremarkable.   visualized osseous structures are grossly unremarkable.  Impression: No acute chest disease is identified.    Electronically signed by: Loi Banda  Date:    01/27/2023  Time:    08:49      Zi Polanco MD   01/27/2023

## 2023-01-27 NOTE — PROGRESS NOTES
Called at 3:00 am 1.28  Trop 1.3    On ASA/Heparin  Load with 180 brilinta  No acute EKG changes  No CP at this time

## 2023-01-27 NOTE — CONSULTS
Inpatient consult to Cardiology  Consult performed by: MAGDALENE Scott-BC  Consult ordered by: Melissa Mariscal MD  Reason for consult: NSTEMI      [facility] - 6th Floor Medical Telemetry  Cardiology  Consult Note    Patient Name: Serge Krishnamurthy  MRN: [id number]  Admission Date: 1/26/2023  Hospital Length of Stay: 1 days  Code Status: Full Code   Attending Provider: Faby Barrera MD   Consulting Provider: TYRESE Scott  Primary Care Physician: No primary care provider on file.  Principal Problem:NSTEMI (non-ST elevated myocardial infarction)    Patient information was obtained from patient, past medical records, and ER records.     Subjective:     Chief Complaint:  Consulted for NSTEMI     HPI:   This is a 47-year-old female, who is unknown to [facility], with history of obesity, hypertension, chronic tobacco use, anxiety.  She presented to  Ortho on 01/26/2023 with complaints of chest pain x1 day.  She described the pain as pressure that was retrosternal and radiated to her throat, left chest, an inner arm.  She stated the pain was intermittent and was exacerbated with exertion was alleviated by rest.  She said she had associated diaphoresis and nausea.  Initial troponin was 0.458.  EKG revealed normal sinus rhythm with a nonspecific T-wave abnormality.  [facility] has been consulted for NSTEMI.    PMH: obesity, hypertension, chronic tobacco use, anxiety  PSH:  Cholecystectomy  Social History:  Current tobacco use, denies EtOH and illicit drug use  Family History:  Mother-CAD/CABG    Previous Cardiac Diagnostics:   No previous diagnostics available for review      Review of patient's allergies indicates:   Allergen Reactions    Opioids - morphine analogues Other (See Comments)     Increase heart rate    Penicillins      Other reaction(s): short of breath, rash    Penicillins Hives    Opioids - morphine analogues Palpitations       No current facility-administered medications on file prior to  encounter.     Current Outpatient Medications on File Prior to Encounter   Medication Sig    amLODIPine (NORVASC) 10 MG tablet Take 10 mg by mouth once daily.    busPIRone (BUSPAR) 15 MG tablet Take 15 mg by mouth 3 (three) times daily.    famotidine (PEPCID) 20 MG tablet Take 1 tablet (20 mg total) by mouth 2 (two) times daily.    FLUoxetine 20 MG capsule Take 20 mg by mouth once daily.    gabapentin (NEURONTIN) 300 MG capsule Take 300 mg by mouth 3 (three) times daily.    traZODone (DESYREL) 100 MG tablet Take 100 mg by mouth nightly.    ZUBSOLV 5.7-1.4 mg Subl Place 1 tablet under the tongue 2 (two) times daily.    albuterol (VENTOLIN HFA) 90 mcg/actuation inhaler Inhale 2 puffs into the lungs every 6 (six) hours as needed for Wheezing. Rescue    [DISCONTINUED] azithromycin (Z-KAVITA) 250 MG tablet Take 2 tablets by mouth on day 1; Take 1 tablet by mouth on days 2-5    [DISCONTINUED] dextromethorphan-guaiFENesin  mg/5 ml (ROBITUSSIN-DM)  mg/5 mL liquid Take 10 mLs by mouth every 6 (six) hours as needed (cough).    [DISCONTINUED] EScitalopram oxalate (LEXAPRO) 10 MG tablet Take 10 mg by mouth once daily.       Review of Systems:  Review of Systems   Constitutional:  Positive for diaphoresis.   HENT: Negative.     Eyes: Negative.    Respiratory: Negative.     Cardiovascular:  Positive for chest pain.   Gastrointestinal:  Positive for nausea.   Endocrine: Negative.    Genitourinary: Negative.    Musculoskeletal: Negative.    Skin: Negative.    Allergic/Immunologic: Negative.    Neurological: Negative.    Hematological: Negative.    Psychiatric/Behavioral: Negative.       Objective:     Vital Signs (Most Recent):  Temp: 97.6 °F (36.4 °C) (01/27/23 0729)  Pulse: 60 (01/27/23 0729)  Resp: 18 (01/27/23 0200)  BP: (!) 137/93 (01/27/23 0729)  SpO2: 98 % (01/27/23 0729)   Vital Signs (24h Range):  Temp:  [97.6 °F (36.4 °C)-98.6 °F (37 °C)] 97.6 °F (36.4 °C)  Pulse:  [] 60  Resp:  [18-20] 18  SpO2:  [96 %-98  %] 98 %  BP: (135-200)/() 137/93     Weight: 104 kg (229 lb 4.5 oz)  Body mass index is 37.02 kg/m².    SpO2: 98 %       No intake or output data in the 24 hours ending 01/27/23 0801    Lines/Drains/Airways       Peripheral Intravenous Line  Duration                  Peripheral IV - Single Lumen 01/26/23 1204 20 G Left Antecubital <1 day         Peripheral IV - Single Lumen 01/26/23 1217 20 G Right Hand <1 day                      Significant Labs: CMP   Recent Labs   Lab 01/26/23  1100 01/26/23  1212 01/27/23  0521     --  137   K 6.0* 5.3* 4.9   CO2 25  --  25   BUN 14.7  --  14.5   CREATININE 0.88  --  0.79   CALCIUM 9.4  --  9.3   ALBUMIN 3.8  --  3.6   BILITOT 0.2  --  0.2   ALKPHOS 136  --  145   AST 13  --  16   ALT 9  --  17   , CBC   Recent Labs   Lab 01/26/23  1100 01/26/23  1212 01/27/23  0521   WBC 10.2 10.3 8.1   HGB 15.8 16.3* 14.6   HCT 49.4* 51.6* 45.5    296 258   , and Troponin   Recent Labs   Lab 01/26/23  1512 01/26/23  2256 01/27/23  0521   TROPONINI 0.687* 1.325* 1.526*         Significant Imaging:   Imaging Results    None           EKG:        Telemetry:  SR    Physical Exam:  Physical Exam  Constitutional:       Appearance: Normal appearance. She is obese.   HENT:      Head: Atraumatic.   Eyes:      Extraocular Movements: Extraocular movements intact.      Conjunctiva/sclera: Conjunctivae normal.   Cardiovascular:      Rate and Rhythm: Normal rate and regular rhythm.      Pulses: Normal pulses.      Heart sounds: Normal heart sounds.   Pulmonary:      Effort: Pulmonary effort is normal.      Breath sounds: Normal breath sounds.   Abdominal:      Palpations: Abdomen is soft.   Musculoskeletal:         General: Normal range of motion.      Cervical back: Neck supple.   Skin:     General: Skin is warm and dry.   Neurological:      Mental Status: She is alert and oriented to person, place, and time.   Psychiatric:         Mood and Affect: Mood normal.         Behavior:  Behavior normal.       Home Medications:   No current facility-administered medications on file prior to encounter.     Current Outpatient Medications on File Prior to Encounter   Medication Sig Dispense Refill    amLODIPine (NORVASC) 10 MG tablet Take 10 mg by mouth once daily.      busPIRone (BUSPAR) 15 MG tablet Take 15 mg by mouth 3 (three) times daily.      famotidine (PEPCID) 20 MG tablet Take 1 tablet (20 mg total) by mouth 2 (two) times daily. 60 tablet 0    FLUoxetine 20 MG capsule Take 20 mg by mouth once daily.      gabapentin (NEURONTIN) 300 MG capsule Take 300 mg by mouth 3 (three) times daily.      traZODone (DESYREL) 100 MG tablet Take 100 mg by mouth nightly.      ZUBSOLV 5.7-1.4 mg Subl Place 1 tablet under the tongue 2 (two) times daily.      albuterol (VENTOLIN HFA) 90 mcg/actuation inhaler Inhale 2 puffs into the lungs every 6 (six) hours as needed for Wheezing. Rescue 18 g 0    [DISCONTINUED] azithromycin (Z-KAVITA) 250 MG tablet Take 2 tablets by mouth on day 1; Take 1 tablet by mouth on days 2-5 6 tablet 0    [DISCONTINUED] dextromethorphan-guaiFENesin  mg/5 ml (ROBITUSSIN-DM)  mg/5 mL liquid Take 10 mLs by mouth every 6 (six) hours as needed (cough). 180 mL 0    [DISCONTINUED] EScitalopram oxalate (LEXAPRO) 10 MG tablet Take 10 mg by mouth once daily.         Current Inpatient Medications:    Current Facility-Administered Medications:     acetaminophen tablet 1,000 mg, 1,000 mg, Oral, Q6H PRN, Faby Barrera MD    acetaminophen tablet 650 mg, 650 mg, Oral, Q4H PRN, Faby Barrera MD    albuterol inhaler 2 puff, 2 puff, Inhalation, Q4H PRN, Faby Barrera MD    aluminum-magnesium hydroxide-simethicone 200-200-20 mg/5 mL suspension 30 mL, 30 mL, Oral, QID PRN, Faby Barrera MD    amLODIPine tablet 10 mg, 10 mg, Oral, Daily, Faby Barrera MD    aspirin EC tablet 81 mg, 81 mg, Oral, Daily, Faby Barrera MD    atorvastatin tablet 40 mg, 40 mg, Oral, Daily, Faby Barrera MD    buprenorphine-naloxone  5.7-1.4 mg Subl 1 tablet, 1 tablet, Sublingual, BID, Faby Barrera MD    busPIRone tablet 15 mg, 15 mg, Oral, TID, Faby Barrera MD    cloNIDine tablet 0.2 mg, 0.2 mg, Oral, TID PRN, Faby Barrera MD    famotidine tablet 20 mg, 20 mg, Oral, BID, Faby Barrera MD    FLUoxetine capsule 20 mg, 20 mg, Oral, Daily, Faby Barrera MD    gabapentin capsule 300 mg, 300 mg, Oral, TID, Faby Barrera MD    heparin 25,000 units in dextrose 5% (100 units/ml) IV bolus from bag - ADDITIONAL PRN BOLUS - 30 units/kg (max bolus 4000 units), 30 Units/kg (Adjusted), Intravenous, PRN, Melissa Mariscal MD, 2,210 Units at 01/26/23 1905    heparin 25,000 units in dextrose 5% (100 units/ml) IV bolus from bag - ADDITIONAL PRN BOLUS - 60 units/kg (max bolus 4000 units), 54.3 Units/kg (Adjusted), Intravenous, PRN, Melissa Mariscal MD    heparin 25,000 units in dextrose 5% 250 mL (100 units/mL) infusion LOW INTENSITY nomogram - LAF, 0-40 Units/kg/hr (Adjusted), Intravenous, Continuous, Kori Coker MD, Last Rate: 12.4 mL/hr at 01/27/23 0225, 16 Units/kg/hr at 01/27/23 0225    hydrALAZINE injection 20 mg, 20 mg, Intravenous, Q4H PRN, Faby Barrera MD    labetaloL injection 10 mg, 10 mg, Intravenous, Q4H PRN, Faby Barrera MD    melatonin tablet 6 mg, 6 mg, Oral, Nightly PRN, Faby Barrera MD    metoprolol succinate (TOPROL-XL) 24 hr tablet 50 mg, 50 mg, Oral, Daily, Faby Barrera MD, 50 mg at 01/27/23 0521    nicotine 21 mg/24 hr 1 patch, 1 patch, Transdermal, Daily, Alec Saldana, , 1 patch at 01/26/23 2315    nitroGLYCERIN SL tablet 0.4 mg, 0.4 mg, Sublingual, Q5 Min PRN, Faby Barrera MD    ondansetron injection 4 mg, 4 mg, Intravenous, Q4H PRN, Faby Barrera MD    polyethylene glycol packet 17 g, 17 g, Oral, BID PRN, Faby Barrera MD    prochlorperazine injection Soln 5 mg, 5 mg, Intravenous, Q6H PRN, Faby Barrera MD    senna-docusate 8.6-50 mg per tablet 2 tablet, 2 tablet, Oral, BID PRN, Faby Barrera MD    simethicone chewable tablet 80 mg, 1  tablet, Oral, QID PRN, Faby Barrera MD    sodium chloride 0.9% flush 10 mL, 10 mL, Intravenous, PRN, Faby Barrera MD    ticagrelor tablet 90 mg, 90 mg, Oral, BID, Faby Barrera MD    traZODone tablet 100 mg, 100 mg, Oral, Nightly, Faby Barrera MD           Assessment:     IMPRESSION:  NSTEMI  HTN  Tobacco use   Obesity   Anxiety  NO Hx of GI Bleed    PLAN:     PLAN:  Continue to trend CE until peak  Continue heparin gtt per protocol  Keep NPO   Plan for LHC today  Risk, Benefits and Alternatives Reviewed and Discussed with the PT and their Family and they wish to proceed with above Procedure.      Thank you for your consult.     [facility]-BC  Cardiology  [facility] - 6th Floor Medical Telemetry  01/27/2023 8:01 AM

## 2023-01-27 NOTE — NURSING
Nurses Note -- 4 Eyes      1/27/2023   5:45 AM      Skin assessed during: Admit      [x] No Pressure Injuries Present    []Prevention Measures Documented      [] Yes- Altered Skin Integrity Present or Discovered   [] LDA Added if Not in Epic (Describe Wound)   [] New Altered Skin Integrity was Present on Admit and Documented in LDA   [] Wound Image Taken    Wound Care Consulted? NO    Attending Nurse:  Darlene Trimble RN     Second RN/Staff Member:  Mary Morales RN

## 2023-01-28 VITALS
DIASTOLIC BLOOD PRESSURE: 73 MMHG | SYSTOLIC BLOOD PRESSURE: 120 MMHG | TEMPERATURE: 98 F | RESPIRATION RATE: 17 BRPM | BODY MASS INDEX: 36.84 KG/M2 | HEART RATE: 67 BPM | WEIGHT: 229.25 LBS | HEIGHT: 66 IN | OXYGEN SATURATION: 100 %

## 2023-01-28 LAB
ANION GAP SERPL CALC-SCNC: 8 MEQ/L
BASOPHILS # BLD AUTO: 0.04 X10(3)/MCL (ref 0–0.2)
BASOPHILS NFR BLD AUTO: 0.4 %
BUN SERPL-MCNC: 15.3 MG/DL (ref 7–18.7)
CALCIUM SERPL-MCNC: 9 MG/DL (ref 8.4–10.2)
CHLORIDE SERPL-SCNC: 109 MMOL/L (ref 98–107)
CO2 SERPL-SCNC: 21 MMOL/L (ref 22–29)
CREAT SERPL-MCNC: 0.87 MG/DL (ref 0.55–1.02)
CREAT/UREA NIT SERPL: 18
EOSINOPHIL # BLD AUTO: 0.19 X10(3)/MCL (ref 0–0.9)
EOSINOPHIL NFR BLD AUTO: 2.1 %
ERYTHROCYTE [DISTWIDTH] IN BLOOD BY AUTOMATED COUNT: 14 % (ref 11.5–17)
GFR SERPLBLD CREATININE-BSD FMLA CKD-EPI: >60 MLS/MIN/1.73/M2
GLUCOSE SERPL-MCNC: 110 MG/DL (ref 74–100)
HCT VFR BLD AUTO: 43.9 % (ref 37–47)
HGB BLD-MCNC: 14.1 GM/DL (ref 12–16)
IMM GRANULOCYTES # BLD AUTO: 0.02 X10(3)/MCL (ref 0–0.04)
IMM GRANULOCYTES NFR BLD AUTO: 0.2 %
LYMPHOCYTES # BLD AUTO: 2.86 X10(3)/MCL (ref 0.6–4.6)
LYMPHOCYTES NFR BLD AUTO: 30.9 %
MAGNESIUM SERPL-MCNC: 1.9 MG/DL (ref 1.6–2.6)
MCH RBC QN AUTO: 30.7 PG
MCHC RBC AUTO-ENTMCNC: 32.1 MG/DL (ref 33–36)
MCV RBC AUTO: 95.6 FL (ref 80–94)
MONOCYTES # BLD AUTO: 0.68 X10(3)/MCL (ref 0.1–1.3)
MONOCYTES NFR BLD AUTO: 7.4 %
NEUTROPHILS # BLD AUTO: 5.46 X10(3)/MCL (ref 2.1–9.2)
NEUTROPHILS NFR BLD AUTO: 59 %
NRBC BLD AUTO-RTO: 0 %
PHOSPHATE SERPL-MCNC: 3.4 MG/DL (ref 2.3–4.7)
PLATELET # BLD AUTO: 279 X10(3)/MCL (ref 130–400)
PMV BLD AUTO: 10.8 FL (ref 7.4–10.4)
POTASSIUM SERPL-SCNC: 4.9 MMOL/L (ref 3.5–5.1)
RBC # BLD AUTO: 4.59 X10(6)/MCL (ref 4.2–5.4)
SODIUM SERPL-SCNC: 138 MMOL/L (ref 136–145)
WBC # SPEC AUTO: 9.3 X10(3)/MCL (ref 4.5–11.5)

## 2023-01-28 PROCEDURE — 85025 COMPLETE CBC W/AUTO DIFF WBC: CPT | Performed by: INTERNAL MEDICINE

## 2023-01-28 PROCEDURE — 83735 ASSAY OF MAGNESIUM: CPT | Performed by: INTERNAL MEDICINE

## 2023-01-28 PROCEDURE — 25000003 PHARM REV CODE 250: Performed by: INTERNAL MEDICINE

## 2023-01-28 PROCEDURE — 84100 ASSAY OF PHOSPHORUS: CPT | Performed by: INTERNAL MEDICINE

## 2023-01-28 PROCEDURE — 80048 BASIC METABOLIC PNL TOTAL CA: CPT | Performed by: INTERNAL MEDICINE

## 2023-01-28 PROCEDURE — 63600175 PHARM REV CODE 636 W HCPCS: Performed by: INTERNAL MEDICINE

## 2023-01-28 RX ORDER — GABAPENTIN 300 MG/1
300 CAPSULE ORAL 3 TIMES DAILY
Qty: 21 CAPSULE | Refills: 0 | OUTPATIENT
Start: 2023-01-28 | End: 2023-05-04

## 2023-01-28 RX ORDER — NITROGLYCERIN 0.4 MG/1
0.4 TABLET SUBLINGUAL EVERY 5 MIN PRN
Qty: 45 TABLET | Refills: 0 | Status: SHIPPED | OUTPATIENT
Start: 2023-01-28 | End: 2023-02-12

## 2023-01-28 RX ORDER — ATORVASTATIN CALCIUM 40 MG/1
40 TABLET, FILM COATED ORAL DAILY
Qty: 30 TABLET | Refills: 0 | Status: SHIPPED | OUTPATIENT
Start: 2023-01-29 | End: 2023-03-21 | Stop reason: SDUPTHER

## 2023-01-28 RX ORDER — ASPIRIN 81 MG/1
81 TABLET ORAL DAILY
Qty: 30 TABLET | Refills: 0 | Status: SHIPPED | OUTPATIENT
Start: 2023-01-29 | End: 2023-02-28

## 2023-01-28 RX ORDER — LOSARTAN POTASSIUM 25 MG/1
25 TABLET ORAL DAILY
Qty: 30 TABLET | Refills: 0 | Status: SHIPPED | OUTPATIENT
Start: 2023-01-28 | End: 2023-02-15

## 2023-01-28 RX ORDER — METOPROLOL SUCCINATE 50 MG/1
50 TABLET, EXTENDED RELEASE ORAL DAILY
Qty: 30 TABLET | Refills: 0 | Status: SHIPPED | OUTPATIENT
Start: 2023-01-29 | End: 2023-03-21 | Stop reason: SDUPTHER

## 2023-01-28 RX ORDER — LANOLIN ALCOHOL/MO/W.PET/CERES
400 CREAM (GRAM) TOPICAL 2 TIMES DAILY
Status: DISCONTINUED | OUTPATIENT
Start: 2023-01-28 | End: 2023-01-28 | Stop reason: HOSPADM

## 2023-01-28 RX ADMIN — ASPIRIN 81 MG: 81 TABLET, COATED ORAL at 08:01

## 2023-01-28 RX ADMIN — HYDRALAZINE HYDROCHLORIDE 20 MG: 20 INJECTION INTRAMUSCULAR; INTRAVENOUS at 11:01

## 2023-01-28 RX ADMIN — ATORVASTATIN CALCIUM 40 MG: 40 TABLET, FILM COATED ORAL at 08:01

## 2023-01-28 RX ADMIN — Medication 400 MG: at 09:01

## 2023-01-28 RX ADMIN — AMLODIPINE BESYLATE 10 MG: 5 TABLET ORAL at 08:01

## 2023-01-28 RX ADMIN — TICAGRELOR 90 MG: 90 TABLET ORAL at 10:01

## 2023-01-28 RX ADMIN — FAMOTIDINE 20 MG: 20 TABLET, FILM COATED ORAL at 08:01

## 2023-01-28 RX ADMIN — GABAPENTIN 300 MG: 300 CAPSULE ORAL at 08:01

## 2023-01-28 RX ADMIN — METOPROLOL SUCCINATE 50 MG: 50 TABLET, EXTENDED RELEASE ORAL at 08:01

## 2023-01-28 NOTE — PROGRESS NOTES
[facility] - 6th Floor Medical Telemetry  Cardiology  Progress Note    Patient Name: Serge Krishnamurthy  MRN: [id number]  Admission Date: 1/26/2023  Hospital Length of Stay: 2 days  Code Status: Full Code   Attending Provider: Faby Barrera MD   Consulting Provider: WILIAM Wilkes  Primary Care Physician: No primary care provider on file.  Principal Problem:NSTEMI (non-ST elevated myocardial infarction)    Patient information was obtained from patient, past medical records, and ER records.     Subjective:     Chief Complaint:  Consulted for NSTEMI     HPI:   This is a 47-year-old female, who is unknown to [facility], with history of obesity, hypertension, chronic tobacco use, anxiety.  She presented to  Ortho on 01/26/2023 with complaints of chest pain x1 day.  She described the pain as pressure that was retrosternal and radiated to her throat, left chest, and inner arm.  She stated the pain was intermittent and was exacerbated with exertion was alleviated by rest.  She said she had associated diaphoresis and nausea.  Initial troponin was 0.458.  EKG revealed normal sinus rhythm with a nonspecific T-wave abnormality.  [facility] has been consulted for NSTEMI.    Hospital Course:   1/28/22: Patient awake in bed. NAD. VSS. Report SOB after taking Brilinta last night. Right radial site benign.       PMH: obesity, hypertension, chronic tobacco use, anxiety  PSH:  Cholecystectomy  Social History:  Current tobacco use, denies EtOH and illicit drug use  Family History:  Mother-CAD/CABG    Previous Cardiac Diagnostics:   Left main coronary artery:  Patent  Left anterior descending artery:  Luminal irregularities.  Diffusely diseased diagonal 1 branch with narrowing of up to 50-60% in the proximal portion  Left circumflex artery:  Luminal irregularity  Right coronary artery: Dominant.  Diffusely diseased vessel with a 90% heavily fibrotic lesion in the proximal to midportion of the vessel.     LVEDP:  26 mm Hg  No  significant transvalvular aortic gradient by pullback method     Assessment:  Successful IVUS guided PCI of the RCA with a 3.5 x 48 mm Synergy XD stent, post-dilated with a 4 mm noncompliant balloon up to 3.5 mm distally and 4 mm mid to proximally.     TTE 01/27/23:  Technically difficult study, Definity contrast is used in this study. The left ventricle is normal in size with normal systolic function. The estimated ejection fraction is 57%. Grade I left ventricular diastolic dysfunction. No regional wall motion abnormalities noted in this study. Mild mitral regurgitation. No significant valvular abnormalities noted in this study.    Review of patient's allergies indicates:   Allergen Reactions    Lisinopril     Opioids - morphine analogues Other (See Comments)     Increase heart rate    Penicillins      Other reaction(s): short of breath, rash    Penicillins Hives    Opioids - morphine analogues Palpitations       Review of Systems:  Review of Systems   Constitutional: Negative.    HENT: Negative.     Eyes: Negative.    Respiratory: Negative.     Cardiovascular:  Negative for chest pain.   Gastrointestinal:  Negative for nausea.   Endocrine: Negative.    Genitourinary: Negative.    Musculoskeletal: Negative.    Skin: Negative.    Allergic/Immunologic: Negative.    Neurological: Negative.    Hematological: Negative.    Psychiatric/Behavioral: Negative.       Objective:     Vital Signs (Most Recent):  Temp: 97.7 °F (36.5 °C) (01/28/23 0905)  Pulse: 67 (01/28/23 0905)  Resp: 17 (01/28/23 0905)  BP: (!) 167/94 (01/28/23 0905)  SpO2: 100 % (01/28/23 0905)   Vital Signs (24h Range):  Temp:  [97.4 °F (36.3 °C)-98.5 °F (36.9 °C)] 97.7 °F (36.5 °C)  Pulse:  [53-67] 67  Resp:  [17-18] 17  SpO2:  [93 %-100 %] 100 %  BP: (126-167)/() 167/94     Weight: 104 kg (229 lb 4.5 oz)  Body mass index is 37.02 kg/m².    SpO2: 100 %         Intake/Output Summary (Last 24 hours) at 1/28/2023 0932  Last data filed at 1/27/2023  1443  Gross per 24 hour   Intake 0 ml   Output --   Net 0 ml       Lines/Drains/Airways       Peripheral Intravenous Line  Duration                  Peripheral IV - Single Lumen 01/26/23 1204 20 G Left Antecubital 1 day         Peripheral IV - Single Lumen 01/26/23 1217 20 G Right Hand 1 day                      Significant Labs: CMP   Recent Labs   Lab 01/26/23  1100 01/26/23  1212 01/27/23  0521 01/28/23  0354     --  137 138   K 6.0* 5.3* 4.9 4.9   CO2 25  --  25 21*   BUN 14.7  --  14.5 15.3   CREATININE 0.88  --  0.79 0.87   CALCIUM 9.4  --  9.3 9.0   ALBUMIN 3.8  --  3.6  --    BILITOT 0.2  --  0.2  --    ALKPHOS 136  --  145  --    AST 13  --  16  --    ALT 9  --  17  --      , CBC   Recent Labs   Lab 01/26/23  1212 01/27/23  0521 01/28/23  0354   WBC 10.3 8.1 9.3   HGB 16.3* 14.6 14.1   HCT 51.6* 45.5 43.9    258 279     , and Troponin   Recent Labs   Lab 01/26/23  2256 01/27/23  0521 01/27/23  1029   TROPONINI 1.325* 1.526* 1.651*           Significant Imaging:   Imaging Results    None     Telemetry:  SR      Physical Exam  Constitutional:       Appearance: Normal appearance. She is obese.   HENT:      Head: Atraumatic.   Eyes:      Extraocular Movements: Extraocular movements intact.      Conjunctiva/sclera: Conjunctivae normal.   Cardiovascular:      Rate and Rhythm: Normal rate and regular rhythm.      Pulses: Normal pulses.      Heart sounds: Normal heart sounds.   Pulmonary:      Effort: Pulmonary effort is normal.      Breath sounds: Normal breath sounds.   Abdominal:      Palpations: Abdomen is soft.   Musculoskeletal:         General: Normal range of motion.      Cervical back: Neck supple.   Skin:     General: Skin is warm and dry.   Neurological:      Mental Status: She is alert and oriented to person, place, and time.   Psychiatric:         Mood and Affect: Mood normal.         Behavior: Behavior normal.     Current Inpatient Medications:    Current Facility-Administered  Medications:     acetaminophen tablet 1,000 mg, 1,000 mg, Oral, Q6H PRN, Faby Barrera MD, 1,000 mg at 01/27/23 1805    acetaminophen tablet 650 mg, 650 mg, Oral, Q4H PRN, Faby Barrera MD    albuterol inhaler 2 puff, 2 puff, Inhalation, Q4H PRN, Faby Barrera MD    aluminum-magnesium hydroxide-simethicone 200-200-20 mg/5 mL suspension 30 mL, 30 mL, Oral, QID PRN, Faby Barrera MD    amLODIPine tablet 10 mg, 10 mg, Oral, Daily, Faby Barrera MD, 10 mg at 01/28/23 0837    aspirin EC tablet 81 mg, 81 mg, Oral, Daily, Faby Barrera MD, 81 mg at 01/28/23 0837    atorvastatin tablet 40 mg, 40 mg, Oral, Daily, Faby Barrera MD, 40 mg at 01/28/23 0838    buprenorphine-naloxone 5.7-1.4 mg Subl 1 tablet, 1 tablet, Sublingual, BID, Faby Barrera MD    busPIRone tablet 15 mg, 15 mg, Oral, TID, Faby Barrera MD, 15 mg at 01/27/23 0840    cloNIDine tablet 0.2 mg, 0.2 mg, Oral, TID PRN, Faby Barrera MD    diphenhydrAMINE injection, , , PRN, Evan Trejo MD, 50 mg at 01/27/23 1330    famotidine tablet 20 mg, 20 mg, Oral, BID, Faby Barrera MD, 20 mg at 01/28/23 0838    fentaNYL injection, , , PRN, Evan Trejo MD, 25 mcg at 01/27/23 1307    FLUoxetine capsule 20 mg, 20 mg, Oral, Daily, Faby Barrera MD    gabapentin capsule 300 mg, 300 mg, Oral, TID, Faby Barrera MD, 300 mg at 01/28/23 0838    heparin (porcine) injection, , , PRN, Evan Trejo MD, 4,000 Units at 01/27/23 1253    heparin 25,000 units in dextrose 5% (100 units/ml) IV bolus from bag - ADDITIONAL PRN BOLUS - 30 units/kg (max bolus 4000 units), 30 Units/kg (Adjusted), Intravenous, PRN, Faby Barrera MD    heparin 25,000 units in dextrose 5% (100 units/ml) IV bolus from bag - ADDITIONAL PRN BOLUS - 60 units/kg (max bolus 4000 units), 51.8 Units/kg (Adjusted), Intravenous, PRN, Faby Barrera MD    heparin 25,000 units in dextrose 5% 250 mL (100 units/mL) infusion LOW INTENSITY nomogram - LAF, 12 Units/kg/hr (Adjusted), Intravenous, Continuous, Faby Barrera MD, Last Rate:  13.9 mL/hr at 01/27/23 1000, 18 Units/kg/hr at 01/27/23 1000    hydrALAZINE injection 20 mg, 20 mg, Intravenous, Q4H PRN, Faby Barrera MD, 20 mg at 01/27/23 2146    iopamidoL (ISOVUE-370) injection, , , PRN, Evan Trejo MD, 175 mL at 01/27/23 1340    labetaloL injection 10 mg, 10 mg, Intravenous, Q4H PRN, Faby Barrera MD    LIDOcaine HCL 10 mg/ml (1%) injection, , , PRN, Evan Trejo MD, 10 mL at 01/27/23 1241    losartan tablet 25 mg, 25 mg, Oral, Daily, Faby Barrera MD    magnesium oxide tablet 400 mg, 400 mg, Oral, BID, Zi Polanco MD    melatonin tablet 6 mg, 6 mg, Oral, Nightly PRN, Faby Barrera MD    metoprolol succinate (TOPROL-XL) 24 hr tablet 50 mg, 50 mg, Oral, Daily, Faby Barrera MD, 50 mg at 01/28/23 0838    midazolam (VERSED) 1 mg/mL injection, , , PRN, Evan Trejo MD, 1 mg at 01/27/23 1321    nicotine 21 mg/24 hr 1 patch, 1 patch, Transdermal, Daily, Alec Saldana, DO, 1 patch at 01/27/23 0840    nitroGLYCERIN in dextrose 5% 200 mcg/mL IVP, , , PRN, Evan Trejo MD, 200 mcg at 01/27/23 1337    nitroGLYCERIN SL tablet 0.4 mg, 0.4 mg, Sublingual, Q5 Min PRN, Faby Barrera MD    ondansetron injection 4 mg, 4 mg, Intravenous, Q4H PRN, Faby Barrera MD    polyethylene glycol packet 17 g, 17 g, Oral, BID PRN, Faby Barrera MD    prochlorperazine injection Soln 5 mg, 5 mg, Intravenous, Q6H PRN, Faby Barrera MD    senna-docusate 8.6-50 mg per tablet 2 tablet, 2 tablet, Oral, BID PRN, Faby Barrera MD    simethicone chewable tablet 80 mg, 1 tablet, Oral, QID PRN, Faby Barrera MD    sodium chloride 0.9% flush 10 mL, 10 mL, Intravenous, PRN, Faby Barrera MD    ticagrelor tablet 90 mg, 90 mg, Oral, BID, Faby Barrera MD, 90 mg at 01/27/23 2136    traZODone tablet 100 mg, 100 mg, Oral, Nightly, Faby Barrera MD, 100 mg at 01/27/23 2136    verapamiL injection, , , PRN, Evan Trejo MD, 2.5 mg at 01/27/23 1243      Assessment:   NSTEMI, Type I  HTN  Diastolic dysfunction  Tobacco use   Obesity    Anxiety  NO Hx of GI Bleed    PLAN:   Continue Brilinta, aspirin, and statin  Continue metoprolol. Losartan, and amlodipine  Encouraged smoking cessation.   Patient instructed on SOB s/t Brilinta in the acute phase. Normally self limiting. Recommended caffeine intake to aid in resolution of symptoms. If symptoms are persistent, will consider switching to plavix at followup    Follow up Dr. Trejo in 7 days    WILIAM Wilkes  Cardiology  [facility] - 6th Floor Medical Telemetry  01/28/2023

## 2023-01-29 ENCOUNTER — HOSPITAL ENCOUNTER (EMERGENCY)
Facility: HOSPITAL | Age: 48
Discharge: HOME | End: 2023-01-29
Attending: EMERGENCY MEDICINE
Payer: MEDICAID

## 2023-01-29 VITALS
HEIGHT: 66 IN | DIASTOLIC BLOOD PRESSURE: 90 MMHG | TEMPERATURE: 98 F | SYSTOLIC BLOOD PRESSURE: 136 MMHG | HEART RATE: 66 BPM | OXYGEN SATURATION: 99 % | BODY MASS INDEX: 37.01 KG/M2 | RESPIRATION RATE: 18 BRPM

## 2023-01-29 DIAGNOSIS — R07.9 CHEST PAIN: ICD-10-CM

## 2023-01-29 DIAGNOSIS — R07.89 LEFT-SIDED CHEST WALL PAIN: Primary | ICD-10-CM

## 2023-01-29 LAB
ANION GAP SERPL CALC-SCNC: 7 MEQ/L
BASOPHILS # BLD AUTO: 0.05 X10(3)/MCL (ref 0–0.2)
BASOPHILS NFR BLD AUTO: 0.5 %
BUN SERPL-MCNC: 15.4 MG/DL (ref 7–18.7)
CALCIUM SERPL-MCNC: 9.9 MG/DL (ref 8.4–10.2)
CHLORIDE SERPL-SCNC: 107 MMOL/L (ref 98–107)
CK MB SERPL-MCNC: 1.1 NG/ML
CO2 SERPL-SCNC: 25 MMOL/L (ref 22–29)
CREAT SERPL-MCNC: 0.85 MG/DL (ref 0.55–1.02)
CREAT/UREA NIT SERPL: 18
EOSINOPHIL # BLD AUTO: 0.23 X10(3)/MCL (ref 0–0.9)
EOSINOPHIL NFR BLD AUTO: 2.2 %
ERYTHROCYTE [DISTWIDTH] IN BLOOD BY AUTOMATED COUNT: 13.8 % (ref 11.5–17)
GFR SERPLBLD CREATININE-BSD FMLA CKD-EPI: >60 MLS/MIN/1.73/M2
GLUCOSE SERPL-MCNC: 146 MG/DL (ref 74–100)
HCT VFR BLD AUTO: 45.9 % (ref 37–47)
HGB BLD-MCNC: 14.6 GM/DL (ref 12–16)
IMM GRANULOCYTES # BLD AUTO: 0.04 X10(3)/MCL (ref 0–0.04)
IMM GRANULOCYTES NFR BLD AUTO: 0.4 %
LYMPHOCYTES # BLD AUTO: 2.46 X10(3)/MCL (ref 0.6–4.6)
LYMPHOCYTES NFR BLD AUTO: 23.4 %
MCH RBC QN AUTO: 30.9 PG
MCHC RBC AUTO-ENTMCNC: 31.8 MG/DL (ref 33–36)
MCV RBC AUTO: 97.2 FL (ref 80–94)
MONOCYTES # BLD AUTO: 0.67 X10(3)/MCL (ref 0.1–1.3)
MONOCYTES NFR BLD AUTO: 6.4 %
NEUTROPHILS # BLD AUTO: 7.07 X10(3)/MCL (ref 2.1–9.2)
NEUTROPHILS NFR BLD AUTO: 67.1 %
NRBC BLD AUTO-RTO: 0 %
PLATELET # BLD AUTO: 258 X10(3)/MCL (ref 130–400)
PMV BLD AUTO: 10.1 FL (ref 7.4–10.4)
POTASSIUM SERPL-SCNC: 4.4 MMOL/L (ref 3.5–5.1)
RBC # BLD AUTO: 4.72 X10(6)/MCL (ref 4.2–5.4)
SODIUM SERPL-SCNC: 139 MMOL/L (ref 136–145)
WBC # SPEC AUTO: 10.5 X10(3)/MCL (ref 4.5–11.5)

## 2023-01-29 PROCEDURE — 82553 CREATINE MB FRACTION: CPT | Performed by: EMERGENCY MEDICINE

## 2023-01-29 PROCEDURE — 93005 ELECTROCARDIOGRAM TRACING: CPT

## 2023-01-29 PROCEDURE — 80048 BASIC METABOLIC PNL TOTAL CA: CPT | Performed by: EMERGENCY MEDICINE

## 2023-01-29 PROCEDURE — 85025 COMPLETE CBC W/AUTO DIFF WBC: CPT | Performed by: EMERGENCY MEDICINE

## 2023-01-29 PROCEDURE — 99284 EMERGENCY DEPT VISIT MOD MDM: CPT | Mod: 25

## 2023-01-29 RX ORDER — DICLOFENAC SODIUM 10 MG/G
2 GEL TOPICAL 4 TIMES DAILY
Qty: 20 G | Refills: 0 | Status: SHIPPED | OUTPATIENT
Start: 2023-01-29

## 2023-01-30 ENCOUNTER — PATIENT OUTREACH (OUTPATIENT)
Dept: ADMINISTRATIVE | Facility: CLINIC | Age: 48
End: 2023-01-30
Payer: MEDICAID

## 2023-01-30 ENCOUNTER — PATIENT MESSAGE (OUTPATIENT)
Dept: ADMINISTRATIVE | Facility: CLINIC | Age: 48
End: 2023-01-30
Payer: MEDICAID

## 2023-01-30 NOTE — ED NOTES
Pt states left chest/breast pain when moving left arm. Recent stent placement. Pt states this pain is 1/10 and not the same pain as it was prior.

## 2023-01-30 NOTE — PROGRESS NOTES
C3 nurse attempted to contact Serge Krishnamurthy for a TCC post hospital discharge follow up call. No answer, left VM, CB# provided.  The patient has a scheduled appointment with Renetta Avitia on 2/15/23 @ 4873 to establish care w/PCP.

## 2023-01-30 NOTE — ED PROVIDER NOTES
"Encounter Date: 1/29/2023       History     Chief Complaint   Patient presents with    Chest Pain     Lt side chest pain, worse with movement of left arm. Pt reports stent placed to rt side of heart on Friday, discharged yesterday from [facility].      Mrs. Serge Krishnamurthy is a 46 yo female PMHx significant for CAD s/p PCI on 1/27 x1 to RCA presents with chief complaint chest pain. Onset was this afternoon when she noticed some left sided chest pain that she describes as soreness "like when you have a bruise" that she states is intermittent, aggravated with use of her left arm and touching the affected area, improved with rest and positioning. She states that she had similar pains prior to onset of her substernal chest pain that resulted in her PCI but it had seemed to go away until today. No other symptoms or complaints voiced. She states that she has been taking her medications as directed. She is still smoking but is working on quitting.    The history is provided by the patient.   Review of patient's allergies indicates:   Allergen Reactions    Lisinopril     Opioids - morphine analogues Other (See Comments)     Increase heart rate    Penicillins      Other reaction(s): short of breath, rash    Penicillins Hives    Opioids - morphine analogues Palpitations     Past Medical History:   Diagnosis Date    Hypertension      Past Surgical History:   Procedure Laterality Date    ANGIOGRAPHY      CHOLECYSTECTOMY       No family history on file.  Social History     Tobacco Use    Smoking status: Every Day     Packs/day: 1.00     Types: Cigarettes    Smokeless tobacco: Never     Review of Systems    Physical Exam     Initial Vitals [01/29/23 2147]   BP Pulse Resp Temp SpO2   (!) 168/112 69 18 98.4 °F (36.9 °C) 99 %      MAP       --         Physical Exam    Nursing note and vitals reviewed.  Constitutional: She appears well-developed and well-nourished.   HENT:   Head: Normocephalic and atraumatic.   Eyes: EOM are normal. " Pupils are equal, round, and reactive to light.   Neck: Neck supple.   Normal range of motion.  Cardiovascular:  Normal rate, regular rhythm and intact distal pulses.           Pulmonary/Chest: Breath sounds normal. She exhibits tenderness.     Abdominal: Abdomen is soft. Bowel sounds are normal.   Musculoskeletal:         General: Tenderness present. Normal range of motion.        Arms:       Cervical back: Normal range of motion and neck supple.     Neurological: She is alert and oriented to person, place, and time. GCS score is 15. GCS eye subscore is 4. GCS verbal subscore is 5. GCS motor subscore is 6.   Skin: Skin is warm and dry. Capillary refill takes less than 2 seconds.   Psychiatric: She has a normal mood and affect.       ED Course   Procedures  Labs Reviewed   BASIC METABOLIC PANEL - Abnormal; Notable for the following components:       Result Value    Glucose Level 146 (*)     All other components within normal limits   CBC WITH DIFFERENTIAL - Abnormal; Notable for the following components:    MCV 97.2 (*)     MCHC 31.8 (*)     All other components within normal limits   CK-MB - Normal   CBC W/ AUTO DIFFERENTIAL    Narrative:     The following orders were created for panel order CBC auto differential.  Procedure                               Abnormality         Status                     ---------                               -----------         ------                     CBC with Differential[[id number]]        Abnormal            Final result                 Please view results for these tests on the individual orders.   EXTRA TUBES    Narrative:     The following orders were created for panel order EXTRA TUBES.  Procedure                               Abnormality         Status                     ---------                               -----------         ------                     Light Blue Top Hold[[id number]]                              In process                 Light Green Top Hold[[id number]]  "                            In process                 Lavender Top Hold[[id number]]                                In process                   Please view results for these tests on the individual orders.   LIGHT BLUE TOP HOLD   LIGHT GREEN TOP HOLD   LAVENDER TOP HOLD     EKG Readings: (Independently Interpreted)   Initial Reading: No STEMI. Previous EKG: Compared with most recent EKG Previous EKG Date: 1/27/23. Rhythm: Normal Sinus Rhythm. Heart Rate: 63. Ectopy: No Ectopy. Conduction: Normal. Axis: Normal.     Imaging Results    None          Medications - No data to display  Medical Decision Making:   History:   Old Medical Records: I decided to obtain old medical records.  Initial Assessment:   46 yo female presents with intermittent left sided chest pain that she describes as sore "like when you have a bruise." Since she recently had NSTEMI with unknown peak troponin level, a repeat troponin was not obtained as this would be expected to be elevated so CK-MB was obtained instead. This was normal. Her ECG shows no evidence of STEMI, improvement in previously seen T-wave inversions inferiorly. I did offer to get CXR but patient declined stating she did not feel like this was necessary and acknowledge understanding of risk of incomplete evaluation for her chest pain. She states that she will continue to monitor for any change in her symptoms especially if she begins to have symptoms similar to when she had NSTEMI, will continue to monitor her BP, take medications as directed, and work on quitting smoking cigarettes. Given strict ED return precautions.  I have spoken with the patient and/or caregivers. I have explained the patient's condition, diagnoses and treatment plan based on the information available to me at this time. I have answered the patient's and/or caregiver's questions and addressed any concerns. The patient and/or caregivers have as good an understanding of the patient's diagnosis, condition and " treatment plan as can be expected at this point. The vital signs have been stable. The patient's condition is stable and appropriate for discharge from the emergency department.    The patient will pursue further outpatient evaluation with the primary care physician or other designated or consulting physician as outlined in the discharge instructions. The patient and/or caregivers are agreeable to this plan of care and follow-up instructions have been explained in detail. The patient and/or caregivers have received these instructions in written format and have expressed an understanding of the discharge instructions. The patient and/or caregivers are aware that any significant change in condition or worsening of symptoms should prompt an immediate return to this or the closest emergency department or a call to 911.   Clinical Tests:   Lab Tests: Ordered and Reviewed  Medical Tests: Ordered and Reviewed                        Clinical Impression:   Final diagnoses:  [R07.9] Chest pain  [R07.89] Left-sided chest wall pain (Primary)        ED Disposition Condition    Discharge Stable          ED Prescriptions       Medication Sig Dispense Start Date End Date Auth. Provider    diclofenac sodium (VOLTAREN) 1 % Gel Apply 2 g topically 4 (four) times daily. 20 g 1/29/2023 -- Prasad Haywood DO          Follow-up Information    None          Prasad Haywood DO  01/29/23 5801

## 2023-01-30 NOTE — DISCHARGE SUMMARY
[facility] Hospital Medicine Discharge Summary    Admit Date: 1/26/2023  Discharge Date and Time: 1/28/2023, 12:57 pm  Admitting Physician:  Team  Discharging Physician: Zi Polanco MD.  Primary Care Physician: No primary care provider on file.  Consults: Cardiology    Discharge Diagnoses:  ACS/ NSTEMI  Hypertensive emergency   Hyperglycemia / Pre diabetes , HbA1c 6.1  Hypertriglyceridemia   Current everyday smoker / Tobacco abuse  Obesity, adult class 2 (BMI 37.02)    Hospital Course:   HPI-  This is a 47-year-old female with medical history notable for obesity BMI 37, hypertension, chronic tobacco smoker, anxiety presented to [facility] ED on 01/26/2023 with complaint of chest pain for 1 day.  Described her chest pain as pressure-like, retrosternal and radiated to her throat, left chest and inner left arm, was intermittent, exacerbated with exertion and alleviated/relieved by rest and associated with diaphoresis and nausea.  Denies prior history of similar chest pain.  Has family history/mother with heart disease/bypass and valve replacement.     On arrival to ED she was afebrile, hypertensive, saturating 97% on room air.  EKG shows sinus rhythm and early T-wave inversion in anteroseptal leads.  Labs notable for potassium 6.0 > 5.3 (without intervention, possibly lab error/hemolyzed sample, normal creatinine, initial troponin 0.458 > 0.687 > 1.325.      She was given antihypertensive, Nitro-Bid, cardiology consulted and started on heparin bolus and a drip per ACS protocol, transferred to [facility] after midnight on 01/27/2023 and subsequently referred to hospital medicine service for further evaluation and management.     1/27- Pt received  mg x1, then 81 mg daily, loaded with Brilinta 180 mg then  90 mg twice daily, high intensity statin , BB and LHC planned for today 1/27/23.       1/28- S/P Successful IVUS guided PCI of the RCA with a 3.5 x 48 mm Synergy XD stent, post-dilated  with a 4 mm noncompliant balloon up to 3.5 mm distally and 4 mm mid to proximally.      Plan:  -Continue dual anti-platelet therapy for minimum of 1 year, lifelong aspirin therapy  -Continue BB, ARB, high intensity statin and further optimization of CAD with guideline directed medical therapy  -Smoking cessation counseled   -Follow-up in clinic 1 week after the procedure    Pt is seen and examined and deemed stable for discharge today.       Vitals:  VITAL SIGNS: 24 HRS MIN & MAX LAST   No data recorded 97.9 °F (36.6 °C)   No data recorded 120/73   No data recorded  67   No data recorded 17   No data recorded 100 %       Physical Exam:  General: In no acute distress, afebrile, Obese body habitus  Chest: Clear to auscultation bilaterally, no wheezes or rhonchi appreciated   Heart: RRR, +S1, S2, no appreciable murmur  Abdomen: Soft, nontender, BS +  MSK: Warm, no lower extremity edema, no clubbing or cyanosis  Neurologic: Alert and oriented x4, Cranial nerve II-XII intact, Strength 5/5 in all 4 extremities        Procedures Performed: No admission procedures for hospital encounter.     Significant Diagnostic Studies: See Full reports for all details    Recent Labs   Lab 01/26/23  1212 01/27/23  0521 01/28/23  0354   WBC 10.3 8.1 9.3   RBC 5.32 4.73 4.59   HGB 16.3* 14.6 14.1   HCT 51.6* 45.5 43.9   MCV 97.0* 96.2* 95.6*   MCH 30.6 30.9 30.7   MCHC 31.6* 32.1* 32.1*   RDW 13.8 13.8 14.0    258 279   MPV 10.0 9.7 10.8*       Recent Labs   Lab 01/26/23  1100 01/26/23  1212 01/27/23  0521 01/28/23  0354     --  137 138   K 6.0* 5.3* 4.9 4.9   CO2 25  --  25 21*   BUN 14.7  --  14.5 15.3   CREATININE 0.88  --  0.79 0.87   CALCIUM 9.4  --  9.3 9.0   MG  --   --  2.00 1.90   ALBUMIN 3.8  --  3.6  --    ALKPHOS 136  --  145  --    ALT 9  --  17  --    AST 13  --  16  --    BILITOT 0.2  --  0.2  --         Microbiology Results (last 7 days)       ** No results found for the last 168 hours. **             Cardiac  catheterization  The procedure log was documented by No documenter listed and verified by   Evan Trejo MD.    Procedure:  Left heart catheterization  Bilateral selective coronary angiography  IVUS of the RCA  PCI of the RCA x1    Preoperative diagnosis:  NSTEMI    Postoperative diagnosis:  NSTEMI  CAD status post PCI    Access: Right radial artery  Estimated blood loss:  10-15 cc  Complications: None    Summary:  Consent obtained.  Risks and benefits discussed with the patient.  The   patient was brought to the cath lab in a fasting state.  Patient was   prepped and draped in usual sterile fashion.  Preprocedure time-out   performed.  Ultrasound-guided right radial access via modified Seldinger   technique with micropuncture.  A 5-6 Tamazight slender sheath was inserted   into the right radial artery.  A 5 Tamazight TIG catheter was used to cross   the aortic valve and LV pressures were measured.  The transvalvular aortic   gradient was measured by pullback method.  The TIG catheter was used to   cannulate the left and right coronary arteries and multiple fluoroscopic   views were obtained.  A 6 Tamazight AL1 was used to cannulate the right   coronary artery.  An 0.14'' Run-through wire was used to cross the   stenotic lesion placed within the distal RCA.  The lesion was pre-dilated   with 2.5 mm compliant balloon.  IVUS performed.  A 3.5 x 48 mm synergy XT   stent was deployed.  A 4 mm NC balloon was used to post dilate the distal   portion of the stent up to 3.5 and the mid to proximal portions of the   stent up to 4 mm.  There was good stent apposition.  No perforation or   dissection and HOLLAND 3 flow within the distal vessel.  At the end of   procedure all wires and catheters were removed.  TR band for hemostasis.    Findings:  Left main coronary artery:  Patent  Left anterior descending artery:  Luminal irregularities.  Diffusely   diseased diagonal 1 branch with narrowing of up to 50-60% in the proximal    portion  Left circumflex artery:  Luminal irregularity  Right coronary artery: Dominant.  Diffusely diseased vessel with a 90%   heavily fibrotic lesion in the proximal to midportion of the vessel.    LVEDP:  26 mm Hg  No significant transvalvular aortic gradient by pullback method    Assessment:  Successful IVUS guided PCI of the RCA with a 3.5 x 48 mm Synergy XD stent,   post-dilated with a 4 mm noncompliant balloon up to 3.5 mm distally and 4   mm mid to proximally.     Plan:  -Continue dual anti-platelet therapy for minimum of 1 year, lifelong   aspirin therapy  -Optimization of CAD with guideline directed medical therapy  -Follow-up in clinic 1 week after the procedure    Date: 1/27/2023  Time: 1:57 PM  Echo  · Technically difficult study, Definity contrast is used in this study.  · The left ventricle is normal in size with normal systolic function.  · The estimated ejection fraction is 57%.  · Grade I left ventricular diastolic dysfunction.  · No regional wall motion abnormalities noted in this study.  · Mild mitral regurgitation.  · No significant valvular abnormalities noted in this study.     X-Ray Chest 1 View  Narrative: EXAMINATION:  XR CHEST 1 VIEW    CLINICAL HISTORY:  chest pain;, .    COMPARISON:  October 9, 2022    FINDINGS:  No alveolar consolidation, effusion, or pneumothorax is seen.   The thoracic aorta is normal  cardiac silhouette, central pulmonary vessels and mediastinum are normal in size and are grossly unremarkable.   visualized osseous structures are grossly unremarkable.  Impression: No acute chest disease is identified.    Electronically signed by: Loi Banda  Date:    01/27/2023  Time:    08:49         Medication List        START taking these medications      aspirin 81 MG EC tablet  Commonly known as: ECOTRIN  Take 1 tablet (81 mg total) by mouth once daily.     atorvastatin 40 MG tablet  Commonly known as: LIPITOR  Take 1 tablet (40 mg total) by mouth once daily.      losartan 25 MG tablet  Commonly known as: COZAAR  Take 1 tablet (25 mg total) by mouth once daily.     metoprolol succinate 50 MG 24 hr tablet  Commonly known as: TOPROL-XL  Take 1 tablet (50 mg total) by mouth once daily.     nitroGLYCERIN 0.4 MG SL tablet  Commonly known as: NITROSTAT  Place 1 tablet (0.4 mg total) under the tongue every 5 (five) minutes as needed for Chest pain.     ticagrelor 90 mg tablet  Commonly known as: BRILINTA  Take 1 tablet (90 mg total) by mouth 2 (two) times a day.            CONTINUE taking these medications      albuterol 90 mcg/actuation inhaler  Commonly known as: VENTOLIN HFA  Inhale 2 puffs into the lungs every 6 (six) hours as needed for Wheezing. Rescue     amLODIPine 10 MG tablet  Commonly known as: NORVASC     busPIRone 15 MG tablet  Commonly known as: BUSPAR     famotidine 20 MG tablet  Commonly known as: PEPCID  Take 1 tablet (20 mg total) by mouth 2 (two) times daily.     FLUoxetine 20 MG capsule     gabapentin 300 MG capsule  Commonly known as: NEURONTIN  Take 1 capsule (300 mg total) by mouth 3 (three) times daily. for 7 days     traZODone 100 MG tablet  Commonly known as: DESYREL     ZUBSOLV 5.7-1.4 mg Subl  Generic drug: buprenorphine-naloxone               Where to Get Your Medications        These medications were sent to [id number] - [place], [place] - [address] [address]      Phone: [phone]   aspirin 81 MG EC tablet  atorvastatin 40 MG tablet  gabapentin 300 MG capsule  losartan 25 MG tablet  metoprolol succinate 50 MG 24 hr tablet  nitroGLYCERIN 0.4 MG SL tablet  ticagrelor 90 mg tablet          Explained in detail to the patient about the discharge plan, medications, and follow-up visits. Pt understands and agrees with the treatment plan  Discharge Disposition: Home or Self Care   Discharged Condition: stable  Diet-  Cardiac   Medications Per [place] med rec  Activities as  tolerated   Follow-up Information       Evan Trejo MD Follow up in 1 week(s).    Specialty: Cardiology  Why: We will call you with your appointment date and time.  Contact information:  [address] [facility] [id number] [phone]               [facility] Follow up.    Why: [facility] will contact you with appointment date and time.  Contact information:  [address] [address]  [phone]                           For further questions contact hospitalist office    Discharge time 40  minutes    For worsening symptoms, chest pain, shortness of breath, increased abdominal pain, high grade fever, stroke or stroke like symptoms, immediately go to the nearest Emergency Room or call 911 as soon as possible.      Zi Adams M.D, on 1/28/2023, 12:57 pm

## 2023-01-30 NOTE — ED NOTES
Pt given discharge instructions. Pt instructed to follow up with pc and cardiologist and return to er if complications. Pt stated understanding.

## 2023-01-31 ENCOUNTER — PATIENT MESSAGE (OUTPATIENT)
Dept: ADMINISTRATIVE | Facility: CLINIC | Age: 48
End: 2023-01-31
Payer: MEDICAID

## 2023-01-31 NOTE — PROGRESS NOTES
C3 nurse attempted to contact Serge Krishnamurthy for a TCC post hospital discharge follow up call. No answer, left VM, CB# provided.  The patient has a scheduled appointment with Renetta Avitia on 2/15/23 @ 7232 to establish care w/PCP.

## 2023-02-01 NOTE — PROGRESS NOTES
C3 nurse attempted to contact Serge Krishnamurthy for a TCC post hospital discharge follow up call. No answer, left VM, CB# provided.  The patient has a scheduled appointment with Renetta Avitia on 2/15/23 @ 9369 to establish care w/PCP.

## 2023-02-07 ENCOUNTER — PATIENT OUTREACH (OUTPATIENT)
Dept: EMERGENCY MEDICINE | Facility: HOSPITAL | Age: 48
End: 2023-02-07
Payer: MEDICAID

## 2023-02-15 ENCOUNTER — OFFICE VISIT (OUTPATIENT)
Dept: INTERNAL MEDICINE | Facility: CLINIC | Age: 48
End: 2023-02-15
Payer: MEDICAID

## 2023-02-15 VITALS
WEIGHT: 234 LBS | BODY MASS INDEX: 37.61 KG/M2 | HEIGHT: 66 IN | DIASTOLIC BLOOD PRESSURE: 102 MMHG | RESPIRATION RATE: 16 BRPM | SYSTOLIC BLOOD PRESSURE: 158 MMHG | HEART RATE: 74 BPM | TEMPERATURE: 98 F

## 2023-02-15 DIAGNOSIS — Z11.59 NEED FOR HEPATITIS C SCREENING TEST: ICD-10-CM

## 2023-02-15 DIAGNOSIS — Z00.00 WELLNESS EXAMINATION: Primary | ICD-10-CM

## 2023-02-15 DIAGNOSIS — F41.8 DEPRESSION WITH ANXIETY: ICD-10-CM

## 2023-02-15 DIAGNOSIS — Z12.31 ENCOUNTER FOR SCREENING MAMMOGRAM FOR MALIGNANT NEOPLASM OF BREAST: ICD-10-CM

## 2023-02-15 DIAGNOSIS — F17.210 CIGARETTE NICOTINE DEPENDENCE WITHOUT COMPLICATION: ICD-10-CM

## 2023-02-15 DIAGNOSIS — I25.10 CORONARY ARTERY DISEASE, UNSPECIFIED VESSEL OR LESION TYPE, UNSPECIFIED WHETHER ANGINA PRESENT, UNSPECIFIED WHETHER NATIVE OR TRANSPLANTED HEART: ICD-10-CM

## 2023-02-15 DIAGNOSIS — I10 PRIMARY HYPERTENSION: ICD-10-CM

## 2023-02-15 DIAGNOSIS — Z12.4 CERVICAL CANCER SCREENING: ICD-10-CM

## 2023-02-15 DIAGNOSIS — Z11.4 SCREENING FOR HIV (HUMAN IMMUNODEFICIENCY VIRUS): ICD-10-CM

## 2023-02-15 DIAGNOSIS — Z11.3 SCREENING EXAMINATION FOR STD (SEXUALLY TRANSMITTED DISEASE): ICD-10-CM

## 2023-02-15 DIAGNOSIS — F51.01 PRIMARY INSOMNIA: ICD-10-CM

## 2023-02-15 DIAGNOSIS — K21.9 GASTROESOPHAGEAL REFLUX DISEASE, UNSPECIFIED WHETHER ESOPHAGITIS PRESENT: ICD-10-CM

## 2023-02-15 DIAGNOSIS — Z12.11 COLON CANCER SCREENING: ICD-10-CM

## 2023-02-15 LAB
ALBUMIN SERPL-MCNC: 3.6 G/DL (ref 3.5–5)
ALBUMIN/GLOB SERPL: 1 RATIO (ref 1.1–2)
ALP SERPL-CCNC: 171 UNIT/L (ref 40–150)
ALT SERPL-CCNC: 14 UNIT/L (ref 0–55)
APPEARANCE UR: CLEAR
AST SERPL-CCNC: 13 UNIT/L (ref 5–34)
BACTERIA #/AREA URNS AUTO: ABNORMAL /HPF
BASOPHILS # BLD AUTO: 0.04 X10(3)/MCL (ref 0–0.2)
BASOPHILS NFR BLD AUTO: 0.4 %
BILIRUB UR QL STRIP.AUTO: NEGATIVE MG/DL
BILIRUBIN DIRECT+TOT PNL SERPL-MCNC: 0.2 MG/DL
BUN SERPL-MCNC: 10.3 MG/DL (ref 7–18.7)
C TRACH DNA SPEC QL NAA+PROBE: NOT DETECTED
CALCIUM SERPL-MCNC: 9.7 MG/DL (ref 8.4–10.2)
CHLORIDE SERPL-SCNC: 108 MMOL/L (ref 98–107)
CHOLEST SERPL-MCNC: 118 MG/DL
CHOLEST/HDLC SERPL: 3 {RATIO} (ref 0–5)
CO2 SERPL-SCNC: 26 MMOL/L (ref 22–29)
COLOR UR AUTO: ABNORMAL
CREAT SERPL-MCNC: 0.87 MG/DL (ref 0.55–1.02)
CREAT UR-MCNC: 95.5 MG/DL (ref 47–110)
DEPRECATED CALCIDIOL+CALCIFEROL SERPL-MC: 11.8 NG/ML (ref 30–80)
EOSINOPHIL # BLD AUTO: 0.2 X10(3)/MCL (ref 0–0.9)
EOSINOPHIL NFR BLD AUTO: 2.2 %
ERYTHROCYTE [DISTWIDTH] IN BLOOD BY AUTOMATED COUNT: 14.1 % (ref 11.5–17)
EST. AVERAGE GLUCOSE BLD GHB EST-MCNC: 122.6 MG/DL
GFR SERPLBLD CREATININE-BSD FMLA CKD-EPI: >60 MLS/MIN/1.73/M2
GLOBULIN SER-MCNC: 3.6 GM/DL (ref 2.4–3.5)
GLUCOSE SERPL-MCNC: 119 MG/DL (ref 74–100)
GLUCOSE UR QL STRIP.AUTO: NORMAL MG/DL
HBA1C MFR BLD: 5.9 %
HCT VFR BLD AUTO: 45.3 % (ref 37–47)
HCV AB SERPL QL IA: NONREACTIVE
HDLC SERPL-MCNC: 36 MG/DL (ref 35–60)
HGB BLD-MCNC: 14.5 GM/DL (ref 12–16)
HIV 1+2 AB+HIV1 P24 AG SERPL QL IA: NONREACTIVE
HYALINE CASTS #/AREA URNS LPF: ABNORMAL /LPF
IMM GRANULOCYTES # BLD AUTO: 0.01 X10(3)/MCL (ref 0–0.04)
IMM GRANULOCYTES NFR BLD AUTO: 0.1 %
KETONES UR QL STRIP.AUTO: NEGATIVE MG/DL
LDLC SERPL CALC-MCNC: 57 MG/DL (ref 50–140)
LEUKOCYTE ESTERASE UR QL STRIP.AUTO: NEGATIVE UNIT/L
LYMPHOCYTES # BLD AUTO: 2.36 X10(3)/MCL (ref 0.6–4.6)
LYMPHOCYTES NFR BLD AUTO: 26.5 %
MCH RBC QN AUTO: 31 PG
MCHC RBC AUTO-ENTMCNC: 32 MG/DL (ref 33–36)
MCV RBC AUTO: 97 FL (ref 80–94)
MICROALBUMIN UR-MCNC: 11.5 UG/ML
MICROALBUMIN/CREAT RATIO PNL UR: 12 MG/GM CR (ref 0–30)
MONOCYTES # BLD AUTO: 0.55 X10(3)/MCL (ref 0.1–1.3)
MONOCYTES NFR BLD AUTO: 6.2 %
MUCOUS THREADS URNS QL MICRO: ABNORMAL /LPF
N GONORRHOEA DNA SPEC QL NAA+PROBE: NOT DETECTED
NEUTROPHILS # BLD AUTO: 5.76 X10(3)/MCL (ref 2.1–9.2)
NEUTROPHILS NFR BLD AUTO: 64.6 %
NITRITE UR QL STRIP.AUTO: NEGATIVE
NRBC BLD AUTO-RTO: 0 %
PH UR STRIP.AUTO: 6 [PH]
PLATELET # BLD AUTO: 280 X10(3)/MCL (ref 130–400)
PMV BLD AUTO: 10.3 FL (ref 7.4–10.4)
POTASSIUM SERPL-SCNC: 4.9 MMOL/L (ref 3.5–5.1)
PROT SERPL-MCNC: 7.2 GM/DL (ref 6.4–8.3)
PROT UR QL STRIP.AUTO: NEGATIVE MG/DL
RBC # BLD AUTO: 4.67 X10(6)/MCL (ref 4.2–5.4)
RBC #/AREA URNS AUTO: ABNORMAL /HPF
RBC UR QL AUTO: ABNORMAL UNIT/L
SODIUM SERPL-SCNC: 142 MMOL/L (ref 136–145)
SP GR UR STRIP.AUTO: 1.02
SQUAMOUS #/AREA URNS LPF: ABNORMAL /HPF
T PALLIDUM AB SER QL: NONREACTIVE
T4 FREE SERPL-MCNC: 0.8 NG/DL (ref 0.7–1.48)
TRIGL SERPL-MCNC: 123 MG/DL (ref 37–140)
TSH SERPL-ACNC: 3.28 UIU/ML (ref 0.35–4.94)
UROBILINOGEN UR STRIP-ACNC: NORMAL MG/DL
VLDLC SERPL CALC-MCNC: 25 MG/DL
WBC # SPEC AUTO: 8.9 X10(3)/MCL (ref 4.5–11.5)
WBC #/AREA URNS AUTO: ABNORMAL /HPF

## 2023-02-15 PROCEDURE — 3080F DIAST BP >= 90 MM HG: CPT | Mod: CPTII,,, | Performed by: NURSE PRACTITIONER

## 2023-02-15 PROCEDURE — 1159F MED LIST DOCD IN RCRD: CPT | Mod: CPTII,,, | Performed by: NURSE PRACTITIONER

## 2023-02-15 PROCEDURE — 1160F PR REVIEW ALL MEDS BY PRESCRIBER/CLIN PHARMACIST DOCUMENTED: ICD-10-PCS | Mod: CPTII,,, | Performed by: NURSE PRACTITIONER

## 2023-02-15 PROCEDURE — 99406 BEHAV CHNG SMOKING 3-10 MIN: CPT | Mod: S$PBB,,, | Performed by: NURSE PRACTITIONER

## 2023-02-15 PROCEDURE — 3077F SYST BP >= 140 MM HG: CPT | Mod: CPTII,,, | Performed by: NURSE PRACTITIONER

## 2023-02-15 PROCEDURE — 99212 OFFICE O/P EST SF 10 MIN: CPT | Mod: 25,S$PBB,, | Performed by: NURSE PRACTITIONER

## 2023-02-15 PROCEDURE — 1160F RVW MEDS BY RX/DR IN RCRD: CPT | Mod: CPTII,,, | Performed by: NURSE PRACTITIONER

## 2023-02-15 PROCEDURE — 82043 UR ALBUMIN QUANTITATIVE: CPT | Performed by: NURSE PRACTITIONER

## 2023-02-15 PROCEDURE — 81001 URINALYSIS AUTO W/SCOPE: CPT | Performed by: NURSE PRACTITIONER

## 2023-02-15 PROCEDURE — 80061 LIPID PANEL: CPT | Performed by: NURSE PRACTITIONER

## 2023-02-15 PROCEDURE — 36415 COLL VENOUS BLD VENIPUNCTURE: CPT | Performed by: NURSE PRACTITIONER

## 2023-02-15 PROCEDURE — 1111F PR DISCHARGE MEDS RECONCILED W/ CURRENT OUTPATIENT MED LIST: ICD-10-PCS | Mod: CPTII,,, | Performed by: NURSE PRACTITIONER

## 2023-02-15 PROCEDURE — 3008F BODY MASS INDEX DOCD: CPT | Mod: CPTII,,, | Performed by: NURSE PRACTITIONER

## 2023-02-15 PROCEDURE — 85025 COMPLETE CBC W/AUTO DIFF WBC: CPT | Performed by: NURSE PRACTITIONER

## 2023-02-15 PROCEDURE — 84443 ASSAY THYROID STIM HORMONE: CPT | Performed by: NURSE PRACTITIONER

## 2023-02-15 PROCEDURE — 99215 OFFICE O/P EST HI 40 MIN: CPT | Mod: PBBFAC | Performed by: NURSE PRACTITIONER

## 2023-02-15 PROCEDURE — 3077F PR MOST RECENT SYSTOLIC BLOOD PRESSURE >= 140 MM HG: ICD-10-PCS | Mod: CPTII,,, | Performed by: NURSE PRACTITIONER

## 2023-02-15 PROCEDURE — 99396 PR PREVENTIVE VISIT,EST,40-64: ICD-10-PCS | Mod: S$PBB,,, | Performed by: NURSE PRACTITIONER

## 2023-02-15 PROCEDURE — 87389 HIV-1 AG W/HIV-1&-2 AB AG IA: CPT | Performed by: NURSE PRACTITIONER

## 2023-02-15 PROCEDURE — 83036 HEMOGLOBIN GLYCOSYLATED A1C: CPT | Performed by: NURSE PRACTITIONER

## 2023-02-15 PROCEDURE — 84439 ASSAY OF FREE THYROXINE: CPT | Performed by: NURSE PRACTITIONER

## 2023-02-15 PROCEDURE — 99396 PREV VISIT EST AGE 40-64: CPT | Mod: S$PBB,,, | Performed by: NURSE PRACTITIONER

## 2023-02-15 PROCEDURE — 4010F ACE/ARB THERAPY RXD/TAKEN: CPT | Mod: CPTII,,, | Performed by: NURSE PRACTITIONER

## 2023-02-15 PROCEDURE — 3080F PR MOST RECENT DIASTOLIC BLOOD PRESSURE >= 90 MM HG: ICD-10-PCS | Mod: CPTII,,, | Performed by: NURSE PRACTITIONER

## 2023-02-15 PROCEDURE — 3008F PR BODY MASS INDEX (BMI) DOCUMENTED: ICD-10-PCS | Mod: CPTII,,, | Performed by: NURSE PRACTITIONER

## 2023-02-15 PROCEDURE — 82306 VITAMIN D 25 HYDROXY: CPT | Performed by: NURSE PRACTITIONER

## 2023-02-15 PROCEDURE — 99406 PR TOBACCO USE CESSATION INTERMEDIATE 3-10 MINUTES: ICD-10-PCS | Mod: S$PBB,,, | Performed by: NURSE PRACTITIONER

## 2023-02-15 PROCEDURE — 86803 HEPATITIS C AB TEST: CPT | Performed by: NURSE PRACTITIONER

## 2023-02-15 PROCEDURE — 87591 N.GONORRHOEAE DNA AMP PROB: CPT | Performed by: NURSE PRACTITIONER

## 2023-02-15 PROCEDURE — 1111F DSCHRG MED/CURRENT MED MERGE: CPT | Mod: CPTII,,, | Performed by: NURSE PRACTITIONER

## 2023-02-15 PROCEDURE — 4010F PR ACE/ARB THEARPY RXD/TAKEN: ICD-10-PCS | Mod: CPTII,,, | Performed by: NURSE PRACTITIONER

## 2023-02-15 PROCEDURE — 1159F PR MEDICATION LIST DOCUMENTED IN MEDICAL RECORD: ICD-10-PCS | Mod: CPTII,,, | Performed by: NURSE PRACTITIONER

## 2023-02-15 PROCEDURE — 80053 COMPREHEN METABOLIC PANEL: CPT | Performed by: NURSE PRACTITIONER

## 2023-02-15 PROCEDURE — 99212 PR OFFICE/OUTPT VISIT, EST, LEVL II, 10-19 MIN: ICD-10-PCS | Mod: 25,S$PBB,, | Performed by: NURSE PRACTITIONER

## 2023-02-15 PROCEDURE — 86780 TREPONEMA PALLIDUM: CPT | Performed by: NURSE PRACTITIONER

## 2023-02-15 RX ORDER — FLUTICASONE PROPIONATE 50 MCG
SPRAY, SUSPENSION (ML) NASAL
COMMUNITY
Start: 2022-01-21 | End: 2023-02-15

## 2023-02-15 RX ORDER — LOSARTAN POTASSIUM 100 MG/1
100 TABLET ORAL DAILY
COMMUNITY

## 2023-02-15 RX ORDER — AMLODIPINE BESYLATE 10 MG/1
10 TABLET ORAL NIGHTLY
Qty: 30 TABLET | Refills: 0 | Status: SHIPPED | OUTPATIENT
Start: 2023-02-15 | End: 2023-03-21 | Stop reason: SDUPTHER

## 2023-02-15 RX ORDER — NALOXONE HYDROCHLORIDE 4 MG/.1ML
SPRAY NASAL
COMMUNITY
Start: 2022-10-28

## 2023-02-15 NOTE — ASSESSMENT & PLAN NOTE
RX losartan 100 mg daily  RX amlodipine 10 mg daily (start)  BP check on Friday    1 week f/u for BP check  Signed up for Digital Medicine  F/U Cardiology   Low Sodium Diet (Dash Diet - less than 2 grams of sodium per day).  Maintain healthy weight with goal BMI <30. Exercise 30 minutes per day 5 days per week.

## 2023-02-15 NOTE — PROGRESS NOTES
Renetta Avitia, WILIAM   [facility]  [address] [address]      PATIENT NAME: Serge Krishnamurthy  : 1975  DATE: 2/15/23  MRN: [id number]      Patient PCP Information       Provider PCP Type    Primary Doctor No General            Reason for Visit / Chief Complaint: Establish Care       History of Present Illness / Problem Focused Workflow     Serge Krishnamurthy presents to the clinic with Establish Care     48 yo WF here to establish care. PMH NSTEMI / CAD s/p PCI on , prediabetes, GERD, insomnia, depression with anxiety, & tobacco use.   Followed by Evan Trejo for Cardiology Services & Suboxone clinic. Followed by Behavioral health.     Cervical Cancer Screenin/15/23   Colon Cancer Screenin/15/23 Cologuard Ordered  Breast Cancer Screenin/15/23  Osteoporosis Screenin/15/23  HCV Screenin/15/23    02/15/23  Pt here today to establish care, recent NSTEMI, was seen by Dr. Trejo 2 days ago for Cardiology f/u, mediation changes occurs; pt BP is elevated today, is only on losartan 100 mg daily, will start amlodipine 10 mg every evening, was recently on it but stopped because she though it was causing swelling but reports that has not changed. pt will come back Friday & next week for a BP check. We will fax over today's note to that clinic so they are aware. Discussed with patient obtaining a blood pressure cuff at home, we were able to sign patient up for Digital Medicine today, she has an appt next Wednesday to discuss and we will f/u on Thursday. Pt reports is currently receiving suboxone, fluoxetine, and buspirone from provider at Suboxone clinic - reports she does not feel the busporone is working as well, will discuss with them but request new referral, will send referral to Dr. Peña.   Pt agreeable to MMG, GYN, Cologuard, & Smoking Cessation today.   Denies chest pain, shortness of breath, cough, fever,  "headache, dizziness, weakness, abdominal pain, nausea, vomiting, diarrhea, constipation, black/bloody stools, unplanned weight loss, night sweats, changes in urinary patterns, burning/odor with urination, depression, anxiety, and SI/HI.         Review of Systems     Review of Systems   Constitutional: Negative.    HENT: Negative.     Eyes: Negative.    Respiratory: Negative.     Cardiovascular: Negative.    Gastrointestinal: Negative.    Endocrine: Negative.    Genitourinary: Negative.    Neurological: Negative.    Psychiatric/Behavioral: Negative.         Medications and Allergies     Medications  Current Outpatient Medications   Medication Instructions    amLODIPine (NORVASC) 10 mg, Oral, Nightly, For High Blood Pressure    aspirin (ECOTRIN) 81 mg, Oral, Daily    atorvastatin (LIPITOR) 40 mg, Oral, Daily    busPIRone (BUSPAR) 15 mg, Oral, 3 times daily    diclofenac sodium (VOLTAREN) 2 g, Topical (Top), 4 times daily    famotidine (PEPCID) 20 mg, Oral, 2 times daily    FLUoxetine 20 mg, Oral, Daily    gabapentin (NEURONTIN) 300 mg, Oral, 3 times daily    losartan (COZAAR) 100 mg, Oral, Daily    metoprolol succinate (TOPROL-XL) 50 mg, Oral, Daily    naloxone (NARCAN) 4 mg/actuation Spry No dose, route, or frequency recorded.    nitroGLYCERIN (NITROSTAT) 0.4 mg, Sublingual, Every 5 min PRN    ticagrelor (BRILINTA) 90 mg, Oral, 2 times daily    traZODone (DESYREL) 100 mg, Oral, Nightly    ZUBSOLV 5.7-1.4 mg Subl 1 tablet, Sublingual, 2 times daily         Allergies  Review of patient's allergies indicates:   Allergen Reactions    Lisinopril     Opioids - morphine analogues Other (See Comments)     Increase heart rate    Penicillins      Other reaction(s): short of breath, rash    Penicillins Hives    Opioids - morphine analogues Palpitations       Physical Examination     Visit Vitals  BP (!) 158/102   Pulse 74   Temp 98 °F (36.7 °C)   Resp 16   Ht 5' 6" (1.676 m)   Wt 106.1 kg (234 lb)   BMI 37.77 kg/m² "       Physical Exam  Vitals reviewed.   Constitutional:       Appearance: Normal appearance. She is normal weight.   HENT:      Head: Normocephalic.   Cardiovascular:      Rate and Rhythm: Normal rate and regular rhythm.      Pulses: Normal pulses.      Heart sounds: Normal heart sounds.   Pulmonary:      Effort: Pulmonary effort is normal.      Breath sounds: Normal breath sounds.   Abdominal:      General: Abdomen is flat.      Palpations: Abdomen is soft.   Musculoskeletal:         General: Normal range of motion.      Cervical back: Normal range of motion.   Skin:     General: Skin is warm and dry.   Neurological:      Mental Status: She is alert.   Psychiatric:         Mood and Affect: Mood normal.         Results     Lab Results   Component Value Date    WBC 10.5 01/29/2023    RBC 4.72 01/29/2023    HGB 14.6 01/29/2023    HCT 45.9 01/29/2023    MCV 97.2 (H) 01/29/2023    MCH 30.9 01/29/2023    MCHC 31.8 (L) 01/29/2023    RDW 13.8 01/29/2023     01/29/2023    MPV 10.1 01/29/2023     CMP  Sodium Level   Date Value Ref Range Status   01/29/2023 139 136 - 145 mmol/L Final     Potassium Level   Date Value Ref Range Status   01/29/2023 4.4 3.5 - 5.1 mmol/L Final     Carbon Dioxide   Date Value Ref Range Status   01/29/2023 25 22 - 29 mmol/L Final     Blood Urea Nitrogen   Date Value Ref Range Status   01/29/2023 15.4 7.0 - 18.7 mg/dL Final     Creatinine   Date Value Ref Range Status   01/29/2023 0.85 0.55 - 1.02 mg/dL Final     Calcium Level Total   Date Value Ref Range Status   01/29/2023 9.9 8.4 - 10.2 mg/dL Final     Albumin Level   Date Value Ref Range Status   01/27/2023 3.6 3.5 - 5.0 g/dL Final     Bilirubin Total   Date Value Ref Range Status   01/27/2023 0.2 <=1.5 mg/dL Final     Alkaline Phosphatase   Date Value Ref Range Status   01/27/2023 145 40 - 150 unit/L Final     Aspartate Aminotransferase   Date Value Ref Range Status   01/27/2023 16 5 - 34 unit/L Final     Alanine Aminotransferase   Date  Value Ref Range Status   01/27/2023 17 0 - 55 unit/L Final     Estimated GFR-Non    Date Value Ref Range Status   04/18/2021 86 (L) >>=90 mL/min/1.73 m2 Final     Lab Results   Component Value Date    CHOL 159 01/27/2023     Lab Results   Component Value Date    HDL 34 (L) 01/27/2023     No results found for: LDLCALC  Lab Results   Component Value Date    TRIG 236 (H) 01/27/2023     No results found for: CHOLHDL  Lab Results   Component Value Date    TSH 2.815 01/27/2023         Assessment        ICD-10-CM ICD-9-CM   1. Wellness examination  Z00.00 V70.0   2. Primary hypertension  I10 401.9   3. Depression with anxiety  F41.8 300.4   4. Coronary artery disease, unspecified vessel or lesion type, unspecified whether angina present, unspecified whether native or transplanted heart  I25.10 414.00   5. Primary insomnia  F51.01 307.42   6. Gastroesophageal reflux disease, unspecified whether esophagitis present  K21.9 530.81   7. Cigarette nicotine dependence without complication  F17.210 305.1   8. Screening for HIV (human immunodeficiency virus)  Z11.4 V73.89   9. Screening examination for STD (sexually transmitted disease)  Z11.3 V74.5   10. Need for hepatitis C screening test  Z11.59 V73.89   11. Colon cancer screening  Z12.11 V76.51   12. Encounter for screening mammogram for malignant neoplasm of breast  Z12.31 V76.12   13. Cervical cancer screening  Z12.4 V76.2        Plan      Problem List Items Addressed This Visit          Psychiatric    Depression with anxiety    Current Assessment & Plan     Referral to Dr. Peña  Continue medications as prescribed  Read positive daily meditations, avoid negative media, set healthy boundaries.  Exercise daily, keep consistent sleep pattern, eat a healthy diet.  Establish good social support, make changes to reduce stress.  Do not drink alcohol or use illicit drugs.  Reports any symptoms of suicidal/homicidal ideations or self harm immediately, go to nearest  emergency room.           Relevant Orders    Ambulatory referral/consult to Psychiatry       Cardiac/Vascular    Primary hypertension    Current Assessment & Plan     RX losartan 100 mg daily  RX amlodipine 10 mg daily (start)  BP check on Friday    1 week f/u for BP check  Signed up for Digital Medicine  F/U Cardiology   Low Sodium Diet (Dash Diet - less than 2 grams of sodium per day).  Maintain healthy weight with goal BMI <30. Exercise 30 minutes per day 5 days per week.           Relevant Medications    amLODIPine (NORVASC) 10 MG tablet    Coronary artery disease    Current Assessment & Plan     F/U Cardiology as directed  Take Medications as directed  Daily Asprin  ED Precautions              GI    Gastroesophageal reflux disease       Other    Cigarette nicotine dependence without complication    Current Assessment & Plan     Smoking cessation discussed > 3 mins.  Pt ready to quit.  Referral to Smoking Cessation Today   Discussed benefits of quitting including improved health, decreased cardiac/vascular/pulmonary/stroke risks as well as saving money.           Relevant Orders    Ambulatory referral/consult to Smoking Cessation Program    Primary insomnia     Other Visit Diagnoses       Wellness examination    -  Primary    Relevant Orders    Hemoglobin A1C    Microalbumin/Creatinine Ratio, Urine    Urinalysis, Reflex to Urine Culture    T4, Free    TSH    Lipid Panel    Comprehensive Metabolic Panel    CBC Auto Differential    Vitamin D    Screening for HIV (human immunodeficiency virus)        Relevant Orders    HIV 1/2 Ag/Ab (4th Gen)    Screening examination for STD (sexually transmitted disease)        Relevant Orders    Chlamydia/GC, PCR    SYPHILIS ANTIBODY (WITH REFLEX RPR)    Need for hepatitis C screening test        Relevant Orders    Hepatitis C Antibody    Colon cancer screening        Relevant Orders    Cologuard Screening (Multitarget Stool DNA)    Encounter for screening mammogram for malignant  neoplasm of breast        Relevant Orders    Mammo Digital Screening Bilat w/ Uday    Cervical cancer screening        Relevant Orders    Ambulatory referral/consult to Gynecology            Future Appointments   Date Time Provider Department Center   2/17/2023  1:00 PM NURSE, [facility]   3/1/2023  1:00 PM Nichole G. George, MD [facility]        Follow up in about 8 days (around 2/23/2023).      Signature:     [facility]  [address] [address]    Date of encounter: 2/15/23

## 2023-02-15 NOTE — ASSESSMENT & PLAN NOTE
Referral to Dr. Peña  Continue medications as prescribed  Read positive daily meditations, avoid negative media, set healthy boundaries.  Exercise daily, keep consistent sleep pattern, eat a healthy diet.  Establish good social support, make changes to reduce stress.  Do not drink alcohol or use illicit drugs.  Reports any symptoms of suicidal/homicidal ideations or self harm immediately, go to nearest emergency room.

## 2023-02-15 NOTE — ASSESSMENT & PLAN NOTE
Smoking cessation discussed > 3 mins.  Pt ready to quit.  Referral to Smoking Cessation Today   Discussed benefits of quitting including improved health, decreased cardiac/vascular/pulmonary/stroke risks as well as saving money.

## 2023-02-25 ENCOUNTER — HOSPITAL ENCOUNTER (EMERGENCY)
Facility: HOSPITAL | Age: 48
Discharge: LEFT BEFORE DISCHARGE | End: 2023-02-26
Payer: MEDICAID

## 2023-02-25 VITALS
WEIGHT: 226 LBS | BODY MASS INDEX: 36.32 KG/M2 | RESPIRATION RATE: 20 BRPM | OXYGEN SATURATION: 100 % | SYSTOLIC BLOOD PRESSURE: 165 MMHG | HEIGHT: 66 IN | TEMPERATURE: 98 F | DIASTOLIC BLOOD PRESSURE: 90 MMHG | HEART RATE: 63 BPM

## 2023-02-25 DIAGNOSIS — R07.9 CHEST PAIN: ICD-10-CM

## 2023-02-25 PROCEDURE — 93005 ELECTROCARDIOGRAM TRACING: CPT

## 2023-02-25 PROCEDURE — 25000003 PHARM REV CODE 250: Performed by: STUDENT IN AN ORGANIZED HEALTH CARE EDUCATION/TRAINING PROGRAM

## 2023-02-25 PROCEDURE — 84484 ASSAY OF TROPONIN QUANT: CPT | Performed by: STUDENT IN AN ORGANIZED HEALTH CARE EDUCATION/TRAINING PROGRAM

## 2023-02-25 PROCEDURE — 93010 EKG 12-LEAD: ICD-10-PCS | Mod: ,,, | Performed by: INTERNAL MEDICINE

## 2023-02-25 PROCEDURE — 85025 COMPLETE CBC W/AUTO DIFF WBC: CPT | Performed by: STUDENT IN AN ORGANIZED HEALTH CARE EDUCATION/TRAINING PROGRAM

## 2023-02-25 PROCEDURE — 80053 COMPREHEN METABOLIC PANEL: CPT | Performed by: STUDENT IN AN ORGANIZED HEALTH CARE EDUCATION/TRAINING PROGRAM

## 2023-02-25 PROCEDURE — 93010 ELECTROCARDIOGRAM REPORT: CPT | Mod: ,,, | Performed by: INTERNAL MEDICINE

## 2023-02-25 PROCEDURE — 99285 EMERGENCY DEPT VISIT HI MDM: CPT | Mod: 25

## 2023-02-25 RX ORDER — NAPROXEN SODIUM 220 MG/1
324 TABLET, FILM COATED ORAL
Status: COMPLETED | OUTPATIENT
Start: 2023-02-25 | End: 2023-02-25

## 2023-02-25 RX ADMIN — ASPIRIN 81 MG CHEWABLE TABLET 324 MG: 81 TABLET CHEWABLE at 11:02

## 2023-02-26 LAB
ALBUMIN SERPL-MCNC: 3.7 G/DL (ref 3.5–5)
ALBUMIN/GLOB SERPL: 1.1 RATIO (ref 1.1–2)
ALP SERPL-CCNC: 152 UNIT/L (ref 40–150)
ALT SERPL-CCNC: 12 UNIT/L (ref 0–55)
AST SERPL-CCNC: 10 UNIT/L (ref 5–34)
BASOPHILS # BLD AUTO: 0.06 X10(3)/MCL (ref 0–0.2)
BASOPHILS NFR BLD AUTO: 0.6 %
BILIRUBIN DIRECT+TOT PNL SERPL-MCNC: 0.3 MG/DL
BUN SERPL-MCNC: 13.9 MG/DL (ref 7–18.7)
CALCIUM SERPL-MCNC: 9.3 MG/DL (ref 8.4–10.2)
CHLORIDE SERPL-SCNC: 108 MMOL/L (ref 98–107)
CO2 SERPL-SCNC: 24 MMOL/L (ref 22–29)
CREAT SERPL-MCNC: 0.97 MG/DL (ref 0.55–1.02)
EOSINOPHIL # BLD AUTO: 0.21 X10(3)/MCL (ref 0–0.9)
EOSINOPHIL NFR BLD AUTO: 2 %
ERYTHROCYTE [DISTWIDTH] IN BLOOD BY AUTOMATED COUNT: 14.5 % (ref 11.5–17)
GFR SERPLBLD CREATININE-BSD FMLA CKD-EPI: >60 MLS/MIN/1.73/M2
GLOBULIN SER-MCNC: 3.5 GM/DL (ref 2.4–3.5)
GLUCOSE SERPL-MCNC: 146 MG/DL (ref 74–100)
HCT VFR BLD AUTO: 43.4 % (ref 37–47)
HGB BLD-MCNC: 14 G/DL (ref 12–16)
IMM GRANULOCYTES # BLD AUTO: 0.03 X10(3)/MCL (ref 0–0.04)
IMM GRANULOCYTES NFR BLD AUTO: 0.3 %
LYMPHOCYTES # BLD AUTO: 3.51 X10(3)/MCL (ref 0.6–4.6)
LYMPHOCYTES NFR BLD AUTO: 33.7 %
MCH RBC QN AUTO: 30.9 PG
MCHC RBC AUTO-ENTMCNC: 32.3 G/DL (ref 33–36)
MCV RBC AUTO: 95.8 FL (ref 80–94)
MONOCYTES # BLD AUTO: 0.66 X10(3)/MCL (ref 0.1–1.3)
MONOCYTES NFR BLD AUTO: 6.3 %
NEUTROPHILS # BLD AUTO: 5.96 X10(3)/MCL (ref 2.1–9.2)
NEUTROPHILS NFR BLD AUTO: 57.1 %
NRBC BLD AUTO-RTO: 0 %
PLATELET # BLD AUTO: 210 X10(3)/MCL (ref 130–400)
PMV BLD AUTO: 10.3 FL (ref 7.4–10.4)
POTASSIUM SERPL-SCNC: 4.7 MMOL/L (ref 3.5–5.1)
PROT SERPL-MCNC: 7.2 GM/DL (ref 6.4–8.3)
RBC # BLD AUTO: 4.53 X10(6)/MCL (ref 4.2–5.4)
SODIUM SERPL-SCNC: 141 MMOL/L (ref 136–145)
TROPONIN I SERPL-MCNC: <0.01 NG/ML (ref 0–0.04)
WBC # SPEC AUTO: 10.4 X10(3)/MCL (ref 4.5–11.5)

## 2023-02-27 ENCOUNTER — HOSPITAL ENCOUNTER (EMERGENCY)
Facility: HOSPITAL | Age: 48
Discharge: HOME | End: 2023-02-28
Attending: STUDENT IN AN ORGANIZED HEALTH CARE EDUCATION/TRAINING PROGRAM
Payer: MEDICAID

## 2023-02-27 DIAGNOSIS — Z91.89: Primary | ICD-10-CM

## 2023-02-27 DIAGNOSIS — R06.02 SOB (SHORTNESS OF BREATH): ICD-10-CM

## 2023-02-27 DIAGNOSIS — R07.9 CHEST PAIN, UNSPECIFIED TYPE: ICD-10-CM

## 2023-02-27 LAB
ALBUMIN SERPL-MCNC: 3.5 G/DL (ref 3.5–5)
ALBUMIN/GLOB SERPL: 1.2 RATIO (ref 1.1–2)
ALP SERPL-CCNC: 156 UNIT/L (ref 40–150)
ALT SERPL-CCNC: 13 UNIT/L (ref 0–55)
APTT PPP: 29.3 SECONDS (ref 23.2–33.7)
AST SERPL-CCNC: 11 UNIT/L (ref 5–34)
BASOPHILS # BLD AUTO: 0.03 X10(3)/MCL (ref 0–0.2)
BASOPHILS NFR BLD AUTO: 0.4 %
BILIRUBIN DIRECT+TOT PNL SERPL-MCNC: 0.4 MG/DL
BNP BLD-MCNC: 24.6 PG/ML
BUN SERPL-MCNC: 10.7 MG/DL (ref 7–18.7)
CALCIUM SERPL-MCNC: 9 MG/DL (ref 8.4–10.2)
CHLORIDE SERPL-SCNC: 107 MMOL/L (ref 98–107)
CO2 SERPL-SCNC: 25 MMOL/L (ref 22–29)
CREAT SERPL-MCNC: 0.83 MG/DL (ref 0.55–1.02)
EOSINOPHIL # BLD AUTO: 0.16 X10(3)/MCL (ref 0–0.9)
EOSINOPHIL NFR BLD AUTO: 1.9 %
ERYTHROCYTE [DISTWIDTH] IN BLOOD BY AUTOMATED COUNT: 14.5 % (ref 11.5–17)
GFR SERPLBLD CREATININE-BSD FMLA CKD-EPI: >60 MLS/MIN/1.73/M2
GLOBULIN SER-MCNC: 3 GM/DL (ref 2.4–3.5)
GLUCOSE SERPL-MCNC: 162 MG/DL (ref 74–100)
HCT VFR BLD AUTO: 42.3 % (ref 37–47)
HGB BLD-MCNC: 13.8 G/DL (ref 12–16)
IMM GRANULOCYTES # BLD AUTO: 0.02 X10(3)/MCL (ref 0–0.04)
IMM GRANULOCYTES NFR BLD AUTO: 0.2 %
INR BLD: 1 (ref 0–1.3)
LYMPHOCYTES # BLD AUTO: 2.87 X10(3)/MCL (ref 0.6–4.6)
LYMPHOCYTES NFR BLD AUTO: 34.9 %
MCH RBC QN AUTO: 31.2 PG
MCHC RBC AUTO-ENTMCNC: 32.6 G/DL (ref 33–36)
MCV RBC AUTO: 95.5 FL (ref 80–94)
MONOCYTES # BLD AUTO: 0.47 X10(3)/MCL (ref 0.1–1.3)
MONOCYTES NFR BLD AUTO: 5.7 %
NEUTROPHILS # BLD AUTO: 4.68 X10(3)/MCL (ref 2.1–9.2)
NEUTROPHILS NFR BLD AUTO: 56.9 %
NRBC BLD AUTO-RTO: 0 %
PLATELET # BLD AUTO: 216 X10(3)/MCL (ref 130–400)
PMV BLD AUTO: 10.1 FL (ref 7.4–10.4)
POTASSIUM SERPL-SCNC: 4.7 MMOL/L (ref 3.5–5.1)
PROT SERPL-MCNC: 6.5 GM/DL (ref 6.4–8.3)
PROTHROMBIN TIME: 13.1 SECONDS (ref 12.5–14.5)
RBC # BLD AUTO: 4.43 X10(6)/MCL (ref 4.2–5.4)
SODIUM SERPL-SCNC: 138 MMOL/L (ref 136–145)
TROPONIN I SERPL-MCNC: <0.01 NG/ML (ref 0–0.04)
WBC # SPEC AUTO: 8.2 X10(3)/MCL (ref 4.5–11.5)

## 2023-02-27 PROCEDURE — 85025 COMPLETE CBC W/AUTO DIFF WBC: CPT | Performed by: NURSE PRACTITIONER

## 2023-02-27 PROCEDURE — 93010 EKG 12-LEAD: ICD-10-PCS | Mod: ,,, | Performed by: STUDENT IN AN ORGANIZED HEALTH CARE EDUCATION/TRAINING PROGRAM

## 2023-02-27 PROCEDURE — 85730 THROMBOPLASTIN TIME PARTIAL: CPT | Performed by: NURSE PRACTITIONER

## 2023-02-27 PROCEDURE — 80053 COMPREHEN METABOLIC PANEL: CPT | Performed by: NURSE PRACTITIONER

## 2023-02-27 PROCEDURE — 83880 ASSAY OF NATRIURETIC PEPTIDE: CPT | Performed by: NURSE PRACTITIONER

## 2023-02-27 PROCEDURE — 99285 EMERGENCY DEPT VISIT HI MDM: CPT | Mod: 25

## 2023-02-27 PROCEDURE — 84484 ASSAY OF TROPONIN QUANT: CPT | Performed by: NURSE PRACTITIONER

## 2023-02-27 PROCEDURE — 85610 PROTHROMBIN TIME: CPT | Performed by: NURSE PRACTITIONER

## 2023-02-27 PROCEDURE — 93005 ELECTROCARDIOGRAM TRACING: CPT

## 2023-02-27 PROCEDURE — 93010 ELECTROCARDIOGRAM REPORT: CPT | Mod: ,,, | Performed by: STUDENT IN AN ORGANIZED HEALTH CARE EDUCATION/TRAINING PROGRAM

## 2023-02-28 ENCOUNTER — PATIENT OUTREACH (OUTPATIENT)
Dept: EMERGENCY MEDICINE | Facility: HOSPITAL | Age: 48
End: 2023-02-28
Payer: MEDICAID

## 2023-02-28 VITALS
RESPIRATION RATE: 16 BRPM | WEIGHT: 200 LBS | OXYGEN SATURATION: 100 % | HEART RATE: 67 BPM | SYSTOLIC BLOOD PRESSURE: 158 MMHG | TEMPERATURE: 98 F | BODY MASS INDEX: 32.14 KG/M2 | DIASTOLIC BLOOD PRESSURE: 102 MMHG | HEIGHT: 66 IN

## 2023-02-28 LAB — TROPONIN I SERPL-MCNC: <0.01 NG/ML (ref 0–0.04)

## 2023-02-28 PROCEDURE — 84484 ASSAY OF TROPONIN QUANT: CPT | Performed by: STUDENT IN AN ORGANIZED HEALTH CARE EDUCATION/TRAINING PROGRAM

## 2023-02-28 NOTE — FIRST PROVIDER EVALUATION
Medical screening examination initiated.  I have conducted a focused provider triage encounter, findings are as follows:    Brief history of present illness:  Patient presents to emergency room with shortness of breath.  She was taking Plavix today.  Took both Plavix and Brilinta today.  He is concerned that she took too much medication.    There were no vitals filed for this visit.    Pertinent physical exam:  Awake, alert, oriented x3.  No acute distress.  Trachea midline.  No abdominal distention.  Moving all extremities.  Neuro intact.  Psych normal.    Brief workup plan:  Lab studies, EKG, chest x-ray    Preliminary workup initiated; this workup will be continued and followed by the physician or advanced practice provider that is assigned to the patient when roomed.

## 2023-02-28 NOTE — ED NOTES
Contacted [place] poison control and spoke w/ [facility]. Informed them that pt md was switching her from brilinta to plavix today and pt mistakenly took her normal BID 90mg dose of brilinta tonight along with her 600mg loading dose of Plavix which her MD had instructed her not to take her brilinta tonight - pt called nurse hotline and became scared after speaking with them that she took double medication. Only c/o sob that is unchanged since MI in January. PC states there is no concern for medication adverse affects on taking both medications on accident tonight - only concern would be if there was chronic double medication use. Pt informed of PC recommendations. Instructed pt to stop taking brilinta as her MD ordered and continue w/ new rx of 75mg plavix daily tomorrow per PC recommendations

## 2023-02-28 NOTE — ED PROVIDER NOTES
Encounter Date: 2/27/2023    SCRIBE #1 NOTE: I, Radha Costa, am scribing for, and in the presence of,  Obi Michael MD.. I have scribed the following portions of the note - Other sections scribed: HPI, ROS, PE.     History     Chief Complaint   Patient presents with    Shortness of Breath     MI in January - on brilinta since - switched to plavix today- states pharmacy messed up directions on directions and took plavix and brilinta tonight - then became scared tonight of taking too much medication. Also reports SOB since she started on brilinta after MI - SOB unchanged for months pt believes it is brilinta causing sob and wants evalated. Poison controlled called about med interactions and reports no monitoring necessary for one time dosing mistake     47 year old female with hx of MI, HTN, and coronary stent presents to ED c/o SOB secondary to taking wrong dosage of medications yesterday (02/27/23). Pt was instructed to begin taking plavix Monday and she reports the pharmacy gave her incorrect instructions. Patient notes concern of side effects from taking her old dosage of Brilinta and a low dosage of plavix yesterday. She also reports mild SOB and worsening anxiety since MI in January.     The history is provided by the patient. No  was used.   Shortness of Breath  The problem has been resolved. Pertinent negatives include no fever, no headaches, no rhinorrhea, no sore throat, no ear pain, no neck pain, no cough, no wheezing, no chest pain, no vomiting, no abdominal pain, no rash and no leg swelling. She has had Prior ED visits. Associated medical issues include past MI.   Review of patient's allergies indicates:   Allergen Reactions    Lisinopril     Opioids - morphine analogues Other (See Comments)     Increase heart rate    Penicillins      Other reaction(s): short of breath, rash    Penicillins Hives    Opioids - morphine analogues Palpitations     Past Medical History:   Diagnosis  Date    Hypertension      Past Surgical History:   Procedure Laterality Date    ANGIOGRAPHY      CHOLECYSTECTOMY      INTRAVASCULAR ULTRASOUND, CORONARY N/A 1/27/2023    Procedure: Ultrasound-coronary;  Surgeon: Evan Trejo MD;  Location: [facility] CATH LAB;  Service: Cardiology;  Laterality: N/A;    LEFT HEART CATHETERIZATION N/A 1/27/2023    Procedure: Left heart cath;  Surgeon: Evan Trejo MD;  Location: [facility] CATH LAB;  Service: Cardiology;  Laterality: N/A;    STENT, DRUG ELUTING, SINGLE VESSEL, CORONARY  1/27/2023    Procedure: Stent, Drug Eluting, Single Vessel, Coronary;  Surgeon: Evan Trejo MD;  Location: [facility] CATH LAB;  Service: Cardiology;;     Family History   Problem Relation Age of Onset    Diabetes Mother     Heart disease Mother     Diabetes Father     Hypertension Father     Heart disease Father     Cancer Father     Febrile seizures Father      Social History     Tobacco Use    Smoking status: Every Day     Packs/day: 1.00     Types: Cigarettes    Smokeless tobacco: Never   Substance Use Topics    Alcohol use: Not Currently    Drug use: Never     Review of Systems   Constitutional:  Negative for chills, fatigue and fever.   HENT:  Negative for congestion, ear discharge, ear pain, nosebleeds, rhinorrhea and sore throat.    Eyes:  Negative for photophobia, pain, discharge and redness.   Respiratory:  Positive for shortness of breath. Negative for cough, chest tightness and wheezing.    Cardiovascular:  Negative for chest pain and leg swelling.   Gastrointestinal:  Negative for abdominal pain, blood in stool, constipation, diarrhea, nausea and vomiting.   Genitourinary:  Negative for dysuria, frequency, hematuria and urgency.   Musculoskeletal:  Negative for arthralgias, myalgias and neck pain.   Skin:  Negative for color change and rash.   Neurological:  Negative for dizziness, seizures, weakness, numbness and headaches.   Psychiatric/Behavioral:  The patient is nervous/anxious.      Physical  Exam     Initial Vitals [02/27/23 2227]   BP Pulse Resp Temp SpO2   (!) 173/100 64 18 98.1 °F (36.7 °C) 100 %      MAP       --         Physical Exam    Nursing note and vitals reviewed.  Constitutional: She appears well-developed and well-nourished. She is not diaphoretic. No distress.   HENT:   Head: Normocephalic and atraumatic.   Right Ear: External ear normal.   Left Ear: External ear normal.   Nose: Nose normal.   Eyes: Conjunctivae and EOM are normal. Pupils are equal, round, and reactive to light. Right eye exhibits no discharge. Left eye exhibits no discharge.   Cardiovascular:  Normal rate, regular rhythm, normal heart sounds and intact distal pulses.     Exam reveals no gallop and no friction rub.       No murmur heard.  Pulmonary/Chest: Breath sounds normal. No respiratory distress. She has no wheezes. She has no rhonchi. She has no rales. She exhibits no tenderness.   Abdominal: Abdomen is soft. Bowel sounds are normal. She exhibits no distension and no mass. There is no abdominal tenderness. There is no rebound and no guarding.   Musculoskeletal:         General: No edema. Normal range of motion.     Neurological: She is alert and oriented to person, place, and time. No cranial nerve deficit or sensory deficit.   Skin: Skin is warm and dry. Capillary refill takes less than 2 seconds. No erythema. No pallor.       ED Course   Procedures  Labs Reviewed   COMPREHENSIVE METABOLIC PANEL - Abnormal; Notable for the following components:       Result Value    Glucose Level 162 (*)     Alkaline Phosphatase 156 (*)     All other components within normal limits   CBC WITH DIFFERENTIAL - Abnormal; Notable for the following components:    MCV 95.5 (*)     MCHC 32.6 (*)     All other components within normal limits   APTT - Normal   B-TYPE NATRIURETIC PEPTIDE - Normal   PROTIME-INR - Normal   TROPONIN I - Normal   TROPONIN I - Normal   CBC W/ AUTO DIFFERENTIAL    Narrative:     The following orders were created for  panel order CBC auto differential.  Procedure                               Abnormality         Status                     ---------                               -----------         ------                     CBC with Differential[[id number]]        Abnormal            Final result                 Please view results for these tests on the individual orders.     EKG Readings: (Independently Interpreted)   Rhythm: Normal Sinus Rhythm. Heart Rate: 62. Conduction: Normal. T Waves Flipped: V3, V4 and V5. Axis: Normal.   Time: 2214. . Good R wave progression.   ECG Results              EKG 12-lead (Final result)  Result time 02/28/23 06:56:47      Final result by Interface, Lab In [facility] (02/28/23 06:56:47)                   Narrative:    Test Reason : R06.02,    Vent. Rate : 062 BPM     Atrial Rate : 062 BPM     P-R Int : 186 ms          QRS Dur : 076 ms      QT Int : 394 ms       P-R-T Axes : 018 049 033 degrees     QTc Int : 399 ms    Normal sinus rhythm  Low voltage QRS  Nonspecific T wave abnormality  Abnormal ECG  Confirmed by Austin Grove MD ([id number]) on 2/28/2023 6:56:39 AM    Referred By:             Confirmed By:Austin Grove MD                                  Imaging Results              X-Ray Chest PA And Lateral (Final result)  Result time 02/28/23 10:12:23      Final result by Loi Banda MD (02/28/23 10:12:23)                   Impression:      No acute chest disease is identified.      Electronically signed by: Loi Banda  Date:    02/28/2023  Time:    10:12               Narrative:    EXAMINATION:  XR CHEST PA AND LATERAL    CLINICAL HISTORY:  , Shortness of breath.    COMPARISON:  February 26, 2023    FINDINGS:  No alveolar consolidation, effusion, or pneumothorax is seen.   The thoracic aorta is normal  cardiac silhouette, central pulmonary vessels and mediastinum are normal in size and are grossly unremarkable.   visualized osseous structures are grossly unremarkable.                                        Medications - No data to display           Scribe Attestation:   Scribe #1: I performed the above scribed service and the documentation accurately describes the services I performed. I attest to the accuracy of the note.    Attending Attestation:           Physician Attestation for Scribe:  Physician Attestation Statement for Scribe #1: I, Obi Michael MD., reviewed documentation, as scribed by Radha Costa in my presence, and it is both accurate and complete.               Medical Decision Making  Patient presents with chest pain, anxiety after accidentally taking both her Brilinta and her Plavix tonight.      Differential Diagnosis including, but not limited to Chest pain emergent diagnoses: ACS, PE, dissection, cardiac tamponade, tension pneumothorax.    Chest pain non-emergent diagnoses: Musculoskeletal, trauma, pleurisy, pneumonia, pleural effusion, GERD, other GI, neurologic, psychiatric and other non emergent diagnoses considered.    Patient presents emergency department chief complaint of chest pain, shortness breath, anxiety after accidentally taking both Brilinta and Plavix tonight.  She reports that the instructions from the pharmacy Ms. Let her and she took both medications tonight, she was only supposed to take the loading dose of Plavix.  Poison control was contacted reported that there was nothing to do for single accidental ingestion.  She is awake and alert she is well-appearing.  She denies any complaints at this time and facet says she feels better sitting here in the ER.  Patient's chest x-ray is clear of any infiltrates or pneumothorax.  Her CBC and CMP are both similar to her prior, she has a continued hyperglycemia today is 126, her alkaline phosphatase continues to be elevated.  Her troponin is negative x2, her BNP is exceedingly low, 28.9.  EKG with no concerning changes.  Patient states this feels like a panic attack her and that she has a history of  anxiety.  Discussed with patient findings.  Plan for discharge home with 2- troponins.  She is amenable to plan. Return precautions given.  Questions invited, questions answered to the best my ability.  Patient discharged home condition stable.           Amount and/or Complexity of Data Reviewed  Independent Historian:      Details: Chart review reveals patient was admitted on 01/26 to this hospital, discharged on 01/28 diagnosis of NSTEMI.  During her stay she was on a heparin drip and ultimately having PCI of the right coronary artery with a stent, was discharged on Brilinta and aspirin.  External Data Reviewed: notes.  Labs: ordered. Decision-making details documented in ED Course.  Radiology: ordered and independent interpretation performed. Decision-making details documented in ED Course.  ECG/medicine tests: ordered and independent interpretation performed. Decision-making details documented in ED Course.              Clinical Impression:   Final diagnoses:  [R06.02] SOB (shortness of breath)  [Z91.89] Potential for poisoning by medications (Primary)  [R07.9] Chest pain, unspecified type        ED Disposition Condition    Discharge Stable          ED Prescriptions    None       Follow-up Information       Follow up With Specialties Details Why Contact Info    WILIAM Doyle Nurse Practitioner Call   [address] [facility] [address] [phone]               Obi Michael MD  02/28/23 2531

## 2023-03-02 ENCOUNTER — HOSPITAL ENCOUNTER (EMERGENCY)
Facility: HOSPITAL | Age: 48
Discharge: HOME | End: 2023-03-03
Attending: STUDENT IN AN ORGANIZED HEALTH CARE EDUCATION/TRAINING PROGRAM
Payer: MEDICAID

## 2023-03-02 VITALS
HEIGHT: 66 IN | OXYGEN SATURATION: 97 % | HEART RATE: 71 BPM | WEIGHT: 200 LBS | BODY MASS INDEX: 32.14 KG/M2 | DIASTOLIC BLOOD PRESSURE: 98 MMHG | TEMPERATURE: 98 F | RESPIRATION RATE: 14 BRPM | SYSTOLIC BLOOD PRESSURE: 149 MMHG

## 2023-03-02 DIAGNOSIS — R06.02 SOB (SHORTNESS OF BREATH): ICD-10-CM

## 2023-03-02 DIAGNOSIS — M79.604 RIGHT LEG PAIN: ICD-10-CM

## 2023-03-02 DIAGNOSIS — M79.89 LEG SWELLING: ICD-10-CM

## 2023-03-02 DIAGNOSIS — R58 ECCHYMOSIS: Primary | ICD-10-CM

## 2023-03-02 DIAGNOSIS — S80.11XA CONTUSION OF RIGHT LOWER LEG, INITIAL ENCOUNTER: ICD-10-CM

## 2023-03-02 PROCEDURE — 80053 COMPREHEN METABOLIC PANEL: CPT | Performed by: NURSE PRACTITIONER

## 2023-03-02 PROCEDURE — 84484 ASSAY OF TROPONIN QUANT: CPT | Performed by: STUDENT IN AN ORGANIZED HEALTH CARE EDUCATION/TRAINING PROGRAM

## 2023-03-02 PROCEDURE — 83880 ASSAY OF NATRIURETIC PEPTIDE: CPT | Performed by: STUDENT IN AN ORGANIZED HEALTH CARE EDUCATION/TRAINING PROGRAM

## 2023-03-02 PROCEDURE — 99284 EMERGENCY DEPT VISIT MOD MDM: CPT

## 2023-03-02 PROCEDURE — 85730 THROMBOPLASTIN TIME PARTIAL: CPT | Performed by: STUDENT IN AN ORGANIZED HEALTH CARE EDUCATION/TRAINING PROGRAM

## 2023-03-02 PROCEDURE — 85610 PROTHROMBIN TIME: CPT | Performed by: NURSE PRACTITIONER

## 2023-03-02 PROCEDURE — 85025 COMPLETE CBC W/AUTO DIFF WBC: CPT | Performed by: NURSE PRACTITIONER

## 2023-03-03 LAB
ALBUMIN SERPL-MCNC: 3.6 G/DL (ref 3.5–5)
ALBUMIN/GLOB SERPL: 1.2 RATIO (ref 1.1–2)
ALP SERPL-CCNC: 160 UNIT/L (ref 40–150)
ALT SERPL-CCNC: 10 UNIT/L (ref 0–55)
APTT PPP: 29.1 SECONDS (ref 23.2–33.7)
AST SERPL-CCNC: 9 UNIT/L (ref 5–34)
BASOPHILS # BLD AUTO: 0.06 X10(3)/MCL (ref 0–0.2)
BASOPHILS NFR BLD AUTO: 0.7 %
BILIRUBIN DIRECT+TOT PNL SERPL-MCNC: 0.2 MG/DL
BNP BLD-MCNC: 14.7 PG/ML
BUN SERPL-MCNC: 11.4 MG/DL (ref 7–18.7)
CALCIUM SERPL-MCNC: 9.1 MG/DL (ref 8.4–10.2)
CHLORIDE SERPL-SCNC: 106 MMOL/L (ref 98–107)
CO2 SERPL-SCNC: 25 MMOL/L (ref 22–29)
CREAT SERPL-MCNC: 0.94 MG/DL (ref 0.55–1.02)
EOSINOPHIL # BLD AUTO: 0.24 X10(3)/MCL (ref 0–0.9)
EOSINOPHIL NFR BLD AUTO: 2.6 %
ERYTHROCYTE [DISTWIDTH] IN BLOOD BY AUTOMATED COUNT: 14.8 % (ref 11.5–17)
GFR SERPLBLD CREATININE-BSD FMLA CKD-EPI: >60 MLS/MIN/1.73/M2
GLOBULIN SER-MCNC: 3 GM/DL (ref 2.4–3.5)
GLUCOSE SERPL-MCNC: 175 MG/DL (ref 74–100)
HCT VFR BLD AUTO: 41.7 % (ref 37–47)
HGB BLD-MCNC: 13.3 G/DL (ref 12–16)
IMM GRANULOCYTES # BLD AUTO: 0.03 X10(3)/MCL (ref 0–0.04)
IMM GRANULOCYTES NFR BLD AUTO: 0.3 %
INR BLD: 0.92 (ref 0–1.3)
LYMPHOCYTES # BLD AUTO: 3.42 X10(3)/MCL (ref 0.6–4.6)
LYMPHOCYTES NFR BLD AUTO: 37.7 %
MCH RBC QN AUTO: 31 PG
MCHC RBC AUTO-ENTMCNC: 31.9 G/DL (ref 33–36)
MCV RBC AUTO: 97.2 FL (ref 80–94)
MONOCYTES # BLD AUTO: 0.58 X10(3)/MCL (ref 0.1–1.3)
MONOCYTES NFR BLD AUTO: 6.4 %
NEUTROPHILS # BLD AUTO: 4.75 X10(3)/MCL (ref 2.1–9.2)
NEUTROPHILS NFR BLD AUTO: 52.3 %
NRBC BLD AUTO-RTO: 0 %
PLATELET # BLD AUTO: 222 X10(3)/MCL (ref 130–400)
PMV BLD AUTO: 10.1 FL (ref 7.4–10.4)
POTASSIUM SERPL-SCNC: 4.6 MMOL/L (ref 3.5–5.1)
PROT SERPL-MCNC: 6.6 GM/DL (ref 6.4–8.3)
PROTHROMBIN TIME: 12.3 SECONDS (ref 12.5–14.5)
RBC # BLD AUTO: 4.29 X10(6)/MCL (ref 4.2–5.4)
SODIUM SERPL-SCNC: 139 MMOL/L (ref 136–145)
TROPONIN I SERPL-MCNC: <0.01 NG/ML (ref 0–0.04)
WBC # SPEC AUTO: 9.1 X10(3)/MCL (ref 4.5–11.5)

## 2023-03-03 PROCEDURE — 93010 EKG 12-LEAD: ICD-10-PCS | Mod: ,,, | Performed by: INTERNAL MEDICINE

## 2023-03-03 PROCEDURE — 25000003 PHARM REV CODE 250: Performed by: STUDENT IN AN ORGANIZED HEALTH CARE EDUCATION/TRAINING PROGRAM

## 2023-03-03 PROCEDURE — 93010 ELECTROCARDIOGRAM REPORT: CPT | Mod: ,,, | Performed by: INTERNAL MEDICINE

## 2023-03-03 PROCEDURE — 93005 ELECTROCARDIOGRAM TRACING: CPT

## 2023-03-03 RX ORDER — ONDANSETRON 4 MG/1
4 TABLET, FILM COATED ORAL EVERY 12 HOURS PRN
Qty: 10 TABLET | Refills: 0 | Status: SHIPPED | OUTPATIENT
Start: 2023-03-03 | End: 2023-03-08

## 2023-03-03 RX ORDER — ONDANSETRON 4 MG/1
4 TABLET, ORALLY DISINTEGRATING ORAL
Status: COMPLETED | OUTPATIENT
Start: 2023-03-03 | End: 2023-03-03

## 2023-03-03 RX ADMIN — ONDANSETRON 4 MG: 4 TABLET, ORALLY DISINTEGRATING ORAL at 01:03

## 2023-03-03 NOTE — ED PROVIDER NOTES
Encounter Date: 3/2/2023    SCRIBE #1 NOTE: I, Radha Torres, am scribing for, and in the presence of,  Edward Kemp MD. I have scribed the following portions of the note - Other sections scribed: HPI, ROS, PE.     History     Chief Complaint   Patient presents with    Leg Pain     Presents with c/o right leg pain. Onset today. Bruise noted to right upper inner thigh. Warm to touch bilat lower extremities. NVI. Denies trauma. Denies recent travels. Currently on Plavix.      47 year old female with history of HTN and an MI presents to the ED with complaints of leg pain and swelling. Pt reports that 2 days ago her cardiologist took her off of Brilinta and put her on Plavix. She states that the pharmacy wrote the instructions on the medication wrong, instructing her to take 600 mg of Plavix with a dose of Brilinta , when it was supposed to say take 600 mg of Plavix when normally due to take dose of Brilinta. She complains of the leg pain and bruising on her RLE and abdomen.     The history is provided by the patient. No  was used.   Leg Pain   There was no injury mechanism. The incident occurred yesterday. The pain is present in the right leg. The pain is at a severity of 0/10. The pain has been Constant since onset. She reports no foreign bodies present. Nothing aggravates the symptoms. She has tried nothing for the symptoms.       Review of patient's allergies indicates:   Allergen Reactions    Lisinopril     Opioids - morphine analogues Other (See Comments)     Increase heart rate    Penicillins      Other reaction(s): short of breath, rash    Penicillins Hives    Opioids - morphine analogues Palpitations     Past Medical History:   Diagnosis Date    Hypertension     NSTEMI (non-ST elevated myocardial infarction)      Past Surgical History:   Procedure Laterality Date    ANGIOGRAPHY      CHOLECYSTECTOMY      INTRAVASCULAR ULTRASOUND, CORONARY N/A 1/27/2023    Procedure: Ultrasound-coronary;   Surgeon: Evan Trejo MD;  Location: [facility] CATH LAB;  Service: Cardiology;  Laterality: N/A;    LEFT HEART CATHETERIZATION N/A 1/27/2023    Procedure: Left heart cath;  Surgeon: Evan Trejo MD;  Location: [facility] CATH LAB;  Service: Cardiology;  Laterality: N/A;    STENT, DRUG ELUTING, SINGLE VESSEL, CORONARY  1/27/2023    Procedure: Stent, Drug Eluting, Single Vessel, Coronary;  Surgeon: Evan Trejo MD;  Location: [facility] CATH LAB;  Service: Cardiology;;     Family History   Problem Relation Age of Onset    Diabetes Mother     Heart disease Mother     Diabetes Father     Hypertension Father     Heart disease Father     Cancer Father     Febrile seizures Father      Social History     Tobacco Use    Smoking status: Every Day     Packs/day: 1.00     Types: Cigarettes    Smokeless tobacco: Never   Substance Use Topics    Alcohol use: Not Currently    Drug use: Never     Review of Systems   Constitutional:  Negative for fever.   HENT:  Negative for sore throat.    Eyes:  Negative for visual disturbance.   Respiratory:  Negative for shortness of breath.    Cardiovascular:  Positive for leg swelling. Negative for chest pain.   Gastrointestinal:  Negative for abdominal pain.   Genitourinary:  Negative for dysuria.   Musculoskeletal:  Negative for joint swelling.        Leg pain   Skin:  Negative for rash.        Bruising    Neurological:  Negative for weakness.   Psychiatric/Behavioral:  Negative for confusion.    All other systems reviewed and are negative.    Physical Exam     Initial Vitals [03/02/23 2306]   BP Pulse Resp Temp SpO2   (!) 149/98 71 14 97.9 °F (36.6 °C) 97 %      MAP       --         Physical Exam    Nursing note and vitals reviewed.  Constitutional: She appears well-developed and well-nourished.   HENT:   Head: Normocephalic and atraumatic.   Eyes: EOM are normal. Pupils are equal, round, and reactive to light.   Neck:   Normal range of motion.  Cardiovascular:  Normal rate, regular rhythm, normal  heart sounds and intact distal pulses.           No murmur heard.  Pulmonary/Chest: Breath sounds normal. No respiratory distress. She has no wheezes. She has no rales.   Abdominal: Abdomen is soft. She exhibits no distension. There is no abdominal tenderness. There is no rebound.   Musculoskeletal:         General: No tenderness or edema. Normal range of motion.      Cervical back: Normal range of motion.     Neurological: She is alert. She has normal strength. No cranial nerve deficit. GCS score is 15. GCS eye subscore is 4. GCS verbal subscore is 5. GCS motor subscore is 6.   Skin: Skin is warm and dry. Capillary refill takes less than 2 seconds. No rash noted. No erythema.   Bruising to right inner thigh, right medial leg, and right abdominal wall.    Psychiatric: Her mood appears anxious.       ED Course   Procedures  Labs Reviewed   COMPREHENSIVE METABOLIC PANEL - Abnormal; Notable for the following components:       Result Value    Glucose Level 175 (*)     Alkaline Phosphatase 160 (*)     All other components within normal limits   PROTIME-INR - Abnormal; Notable for the following components:    PT 12.3 (*)     All other components within normal limits   CBC WITH DIFFERENTIAL - Abnormal; Notable for the following components:    MCV 97.2 (*)     MCHC 31.9 (*)     All other components within normal limits   APTT - Normal   TROPONIN I - Normal   B-TYPE NATRIURETIC PEPTIDE - Normal   CBC W/ AUTO DIFFERENTIAL    Narrative:     The following orders were created for panel order CBC Auto Differential.  Procedure                               Abnormality         Status                     ---------                               -----------         ------                     CBC with Differential[[id number]]        Abnormal            Final result                 Please view results for these tests on the individual orders.     EKG Readings: (Independently Interpreted)   Rhythm: Normal Sinus Rhythm. Heart Rate: 94.  Ectopy: No Ectopy. Conduction: Normal. ST Segments: Normal ST Segments. T Waves: Normal. T Waves Flipped: III and AVF. Clinical Impression: Normal Sinus Rhythm   EKG performed at 1:01 on 3/3/23.   ECG Results              EKG 12-lead (Final result)  Result time 03/03/23 18:50:40      Final result by Interface, Lab In [facility] (03/03/23 18:50:40)                   Narrative:    Test Reason : R06.02,    Vent. Rate : 064 BPM     Atrial Rate : 064 BPM     P-R Int : 208 ms          QRS Dur : 084 ms      QT Int : 402 ms       P-R-T Axes : 050 049 036 degrees     QTc Int : 414 ms    Normal sinus rhythm  T wave abnormality, consider anterolateral ischemia  Abnormal ECG  When compared with ECG of 27-FEB-2023 22:14,  No significant change was found  Confirmed by Feliciano Hopkins MD ([id number]) on 3/3/2023 6:50:30 PM    Referred By: [email or web address]   SELF           Confirmed By:Feliciano Hopkins MD                                     EKG 12-LEAD (Final result)  Result time 03/09/23 09:46:25      Final result by Unknown User (03/09/23 09:46:25)                                         EKG 12-LEAD (Final result)  Result time 03/10/23 13:06:00      Final result by Unknown User (03/10/23 13:06:00)                                      Imaging Results    None          Medications   ondansetron disintegrating tablet 4 mg (4 mg Oral Given 3/3/23 0103)     Medical Decision Making:   History:   I obtained history from: primary care / consultant.       <> Summary of History: Discussed with GUY Avila.  Patient switched to Plavix.   Old Medical Records: I decided to obtain old medical records.  Old Records Summarized: records from clinic visits and records from previous admission(s).       <> Summary of Records: Reviewed previous records: hx of MI in past.   Initial Assessment:   Bruising.  Differential Diagnosis:   Ecchymosis, Contusions, Supratherapeutic medication, Cellulitis, abscess, DVT  Clinical Tests:   Lab Tests: Ordered and Reviewed  Radiological Study:  Ordered and Reviewed  Medical Tests: Ordered and Reviewed  ED Management:  Patient is a 47-year-old female who presents to emergency department for bruising to lower extremities.  See HPI.  See physical exam.  Labs and imaging as noted.  Recent confusion with her medication antiplatelet therapy.  Patient concerned about lower extremity swelling, ultrasounds obtained without evidence of DVT.  No cellulitis or abscesses noted.  Likely some bruising from some suprapubic repeated medication.  Discussed with cardiology.  Discussed all results with the patient.  Discussed need for follow-up with cardiology.  Discussed return precautions.  Answered all questions time.  Patient verbalized understanding agreed to plan.  Hemodynamically stable for continued outpatient management strict return precautions.  Other:   I have discussed this case with another health care provider.  Dx: Leg swelling [M79.89 (ICD-10-CM)]    Conclusion       · There is no evidence of a right lower extremity DVT.  · There is no evidence of a left lower extremity DVT.     Bilateral lower extremities negative for deep thrombus at time of exam.               Attending Attestation:           Physician Attestation for Scribe:  Physician Attestation Statement for Scribe #1: I, Edward Kemp MD, reviewed documentation, as scribed by Radha Torres in my presence, and it is both accurate and complete.           ED Course as of 03/19/23 1352   Fri Mar 03, 2023   0206 Spoke with Austin.  [RP]      ED Course User Index  [RP] Edward Kemp MD          Medical Decision Making  Problems Addressed:  Contusion of right lower leg, initial encounter: acute illness or injury that poses a threat to life or bodily functions  Ecchymosis: acute illness or injury that poses a threat to life or bodily functions  Leg swelling: acute illness or injury that poses a threat to life or bodily functions  Right leg pain: acute illness or injury    Amount and/or Complexity of Data  Reviewed  External Data Reviewed: ECG and notes.  Labs: ordered.  Radiology: ordered.  ECG/medicine tests: ordered and independent interpretation performed.    Risk  OTC drugs.  Prescription drug management.             Clinical Impression:   Final diagnoses:  [M79.89] Leg swelling  [R06.02] SOB (shortness of breath)  [R58] Ecchymosis (Primary)  [M79.604] Right leg pain  [S80.11XA] Contusion of right lower leg, initial encounter        ED Disposition Condition    Discharge Stable          ED Prescriptions       Medication Sig Dispense Start Date End Date Auth. Provider    ondansetron (ZOFRAN) 4 MG tablet () Take 1 tablet (4 mg total) by mouth every 12 (twelve) hours as needed for Nausea. 10 tablet 3/3/2023 3/8/2023 Edward Kemp MD          Follow-up Information       Follow up With Specialties Details Why Contact Info    WILIAM Doyle Nurse Practitioner   [address] [facility] [address] [phone]      Your primary care physician.                 Edward Kemp MD  23 [phone]

## 2023-03-03 NOTE — DISCHARGE INSTRUCTIONS
Follow up with your cardiologist.      Follow up with your primary care physician.     You may take aspirin 81mg and Plavix 75mg daily.  Do not take Brilinta.   Taking multiple antiplatelet medications can lead to bruising.     Return to the emergency department if any new or worsening symptoms, nausea, vomiting, difficulty breathing, fever, headache, or any other symptoms.

## 2023-03-10 ENCOUNTER — TELEPHONE (OUTPATIENT)
Dept: INTERNAL MEDICINE | Facility: CLINIC | Age: 48
End: 2023-03-10
Payer: MEDICAID

## 2023-03-10 NOTE — TELEPHONE ENCOUNTER
Please call the patient and advise the following:    She missed her face to face appointment with me on 02/23/23 where we were supposed to discuss all of her lab work and recheck her blood pressure. She also missed her Digital Medicine virtual visit on 03/01/23 which was going to be another resource for us to assist with her blood pressure. She will need to make another Face to Face visit with me to discuss her lab work and evaluate her blood pressure. I see that she did go to the ED on 03/02 for her leg issues and it was related to a mediation change from her Cardiologist, I hope that she did follow up with them and if she did not, she needs to. She can be advised to go the ED / [facility] if she is continuing issues at this time.     Thanks,    WILIAM Krishnan

## 2023-03-10 NOTE — TELEPHONE ENCOUNTER
Spoke with pt she verbalized. Pt made a F2F appt and did verbalize understanding to f/u with cardio and utilize ED or UCC if needed.

## 2023-03-13 ENCOUNTER — TELEPHONE (OUTPATIENT)
Dept: SMOKING CESSATION | Facility: CLINIC | Age: 48
End: 2023-03-13
Payer: MEDICAID

## 2023-03-13 NOTE — TELEPHONE ENCOUNTER
Pt had not shown up for her [facility] appt.  Called pt.  No answer.  Left voice message with contact information.

## 2023-03-14 ENCOUNTER — HOSPITAL ENCOUNTER (EMERGENCY)
Facility: HOSPITAL | Age: 48
Discharge: HOME | End: 2023-03-14
Attending: EMERGENCY MEDICINE
Payer: MEDICAID

## 2023-03-14 VITALS
WEIGHT: 243 LBS | DIASTOLIC BLOOD PRESSURE: 91 MMHG | OXYGEN SATURATION: 97 % | HEIGHT: 66 IN | SYSTOLIC BLOOD PRESSURE: 154 MMHG | TEMPERATURE: 99 F | HEART RATE: 74 BPM | RESPIRATION RATE: 18 BRPM | BODY MASS INDEX: 39.05 KG/M2

## 2023-03-14 DIAGNOSIS — R31.9 HEMATURIA, UNSPECIFIED TYPE: ICD-10-CM

## 2023-03-14 DIAGNOSIS — R10.9 FLANK PAIN: ICD-10-CM

## 2023-03-14 DIAGNOSIS — M54.9 ACUTE RIGHT-SIDED BACK PAIN, UNSPECIFIED BACK LOCATION: Primary | ICD-10-CM

## 2023-03-14 LAB
ALBUMIN SERPL-MCNC: 3.4 G/DL (ref 3.5–5)
ALBUMIN/GLOB SERPL: 0.9 RATIO (ref 1.1–2)
ALP SERPL-CCNC: 177 UNIT/L (ref 40–150)
ALT SERPL-CCNC: 15 UNIT/L (ref 0–55)
APPEARANCE UR: CLEAR
AST SERPL-CCNC: 12 UNIT/L (ref 5–34)
B-HCG SERPL QL: NEGATIVE
BACTERIA #/AREA URNS AUTO: ABNORMAL /HPF
BASOPHILS # BLD AUTO: 0.04 X10(3)/MCL (ref 0–0.2)
BASOPHILS NFR BLD AUTO: 0.6 %
BILIRUB UR QL STRIP.AUTO: NEGATIVE MG/DL
BILIRUBIN DIRECT+TOT PNL SERPL-MCNC: 0.2 MG/DL
BUN SERPL-MCNC: 8.4 MG/DL (ref 7–18.7)
CALCIUM SERPL-MCNC: 8.8 MG/DL (ref 8.4–10.2)
CHLORIDE SERPL-SCNC: 107 MMOL/L (ref 98–107)
CO2 SERPL-SCNC: 23 MMOL/L (ref 22–29)
COLOR UR AUTO: YELLOW
CREAT SERPL-MCNC: 0.85 MG/DL (ref 0.55–1.02)
EOSINOPHIL # BLD AUTO: 0.17 X10(3)/MCL (ref 0–0.9)
EOSINOPHIL NFR BLD AUTO: 2.5 %
ERYTHROCYTE [DISTWIDTH] IN BLOOD BY AUTOMATED COUNT: 14.4 % (ref 11.5–17)
GFR SERPLBLD CREATININE-BSD FMLA CKD-EPI: >60 MLS/MIN/1.73/M2
GLOBULIN SER-MCNC: 3.6 GM/DL (ref 2.4–3.5)
GLUCOSE SERPL-MCNC: 171 MG/DL (ref 74–100)
GLUCOSE UR QL STRIP.AUTO: 100 MG/DL
HCT VFR BLD AUTO: 43.1 % (ref 37–47)
HGB BLD-MCNC: 13.7 G/DL (ref 12–16)
IMM GRANULOCYTES # BLD AUTO: 0.01 X10(3)/MCL (ref 0–0.04)
IMM GRANULOCYTES NFR BLD AUTO: 0.1 %
KETONES UR QL STRIP.AUTO: NEGATIVE MG/DL
LEUKOCYTE ESTERASE UR QL STRIP.AUTO: NEGATIVE UNIT/L
LYMPHOCYTES # BLD AUTO: 1.83 X10(3)/MCL (ref 0.6–4.6)
LYMPHOCYTES NFR BLD AUTO: 26.7 %
MCH RBC QN AUTO: 31.2 PG
MCHC RBC AUTO-ENTMCNC: 31.8 G/DL (ref 33–36)
MCV RBC AUTO: 98.2 FL (ref 80–94)
MONOCYTES # BLD AUTO: 0.52 X10(3)/MCL (ref 0.1–1.3)
MONOCYTES NFR BLD AUTO: 7.6 %
MUCOUS THREADS URNS QL MICRO: ABNORMAL /LPF
NEUTROPHILS # BLD AUTO: 4.28 X10(3)/MCL (ref 2.1–9.2)
NEUTROPHILS NFR BLD AUTO: 62.5 %
NITRITE UR QL STRIP.AUTO: NEGATIVE
NRBC BLD AUTO-RTO: 0 %
PH UR STRIP.AUTO: 6 [PH]
PLATELET # BLD AUTO: 288 X10(3)/MCL (ref 130–400)
PMV BLD AUTO: 9.3 FL (ref 7.4–10.4)
POTASSIUM SERPL-SCNC: 4.6 MMOL/L (ref 3.5–5.1)
PROT SERPL-MCNC: 7 GM/DL (ref 6.4–8.3)
PROT UR QL STRIP.AUTO: NEGATIVE MG/DL
RBC # BLD AUTO: 4.39 X10(6)/MCL (ref 4.2–5.4)
RBC #/AREA URNS AUTO: ABNORMAL /HPF
RBC UR QL AUTO: ABNORMAL UNIT/L
SODIUM SERPL-SCNC: 139 MMOL/L (ref 136–145)
SP GR UR STRIP.AUTO: 1.02
SQUAMOUS #/AREA URNS AUTO: ABNORMAL /HPF
UROBILINOGEN UR STRIP-ACNC: 0.2 MG/DL
WBC # SPEC AUTO: 6.9 X10(3)/MCL (ref 4.5–11.5)
WBC #/AREA URNS AUTO: ABNORMAL /HPF

## 2023-03-14 PROCEDURE — 81025 URINE PREGNANCY TEST: CPT

## 2023-03-14 PROCEDURE — 99284 EMERGENCY DEPT VISIT MOD MDM: CPT | Mod: 25

## 2023-03-14 PROCEDURE — 25000003 PHARM REV CODE 250

## 2023-03-14 PROCEDURE — 85025 COMPLETE CBC W/AUTO DIFF WBC: CPT

## 2023-03-14 PROCEDURE — 81001 URINALYSIS AUTO W/SCOPE: CPT

## 2023-03-14 PROCEDURE — 80053 COMPREHEN METABOLIC PANEL: CPT

## 2023-03-14 RX ORDER — HYDROCODONE BITARTRATE AND ACETAMINOPHEN 5; 325 MG/1; MG/1
1 TABLET ORAL EVERY 6 HOURS PRN
Qty: 8 TABLET | Refills: 0 | Status: SHIPPED | OUTPATIENT
Start: 2023-03-14

## 2023-03-14 RX ORDER — KETOROLAC TROMETHAMINE 30 MG/ML
15 INJECTION, SOLUTION INTRAMUSCULAR; INTRAVENOUS
Status: DISCONTINUED | OUTPATIENT
Start: 2023-03-14 | End: 2023-03-14

## 2023-03-14 RX ORDER — HYDROCODONE BITARTRATE AND ACETAMINOPHEN 5; 325 MG/1; MG/1
1 TABLET ORAL
Status: COMPLETED | OUTPATIENT
Start: 2023-03-14 | End: 2023-03-14

## 2023-03-14 RX ADMIN — HYDROCODONE BITARTRATE AND ACETAMINOPHEN 1 TABLET: 5; 325 TABLET ORAL at 02:03

## 2023-03-14 NOTE — DISCHARGE INSTRUCTIONS
Thanks for letting us take care of you today!  It is our goal to give you courteous care and to keep you comfortable and informed, if you have any questions before you leave I will be happy to try and answer them.    Here is some advice after your visit:      Your visit in the emergency department is NOT definitive care - please follow-up with your primary care doctor and/or specialist within 1 week.  Please return if you have any worsening in your condition or if you have any other concerns.    If you had radiology exams like an XRAY or CT in the emergency Department the interpreation on them may be preliminary - there may be less time sensitive findings on the reports please obtain these reports within 24 hours from the hospital or by using your out on your mobile phone to access records.  Bring these to your primary care doctor and/or specialist for further review of incidental findings.    Please review any LAB WORK from your visit today with your primary care physician.    If you were prescribed OPIATE PAIN MEDICATION - please understand of these medications can be addictive, you may fill less of the prescription was written for, you do not have to take the full prescription.  You may discard what you do not use.  Please seek help if you feel you are having problems with addiction.  Do not drive or operate heavy machinery if you are taking sedating medications.  Do not mix these medications with alcohol.

## 2023-03-14 NOTE — ED PROVIDER NOTES
Encounter Date: 3/14/2023       History     Chief Complaint   Patient presents with    Abdominal Pain     C/o Abdominal Pains, back pain and  Blood In Urine> States it's been around 5 days     47 y.o. White female with a history of NSTEMI and hypertension presents to Emergency Department with a chief complaint of atraumatic back pain that radiates to her lower abdomen. Symptoms began 1 month ago and have been constant since onset. Associated symptoms include hematuria and  R flank pain. Symptoms are aggravated with palpation and movement and there are no alleviating factors. The patient denies recent injury, CP, SOB, weakness, fever, chills, or vomiting. No other reported symptoms at this time      The history is provided by the patient. No  was used.   Illness   The current episode started several weeks ago. The problem occurs continuously. The problem has been unchanged. Nothing relieves the symptoms. Associated symptoms include abdominal pain. Pertinent negatives include no fever, no photophobia, no nausea, no vomiting, no headaches, no rhinorrhea, no sore throat, no stridor, no cough, no shortness of breath, no URI and no wheezing. Services received include tests performed. Recently, medical care has been given by the PCP.   Review of patient's allergies indicates:   Allergen Reactions    Lisinopril     Opioids - morphine analogues Other (See Comments)     Increase heart rate    Penicillins      Other reaction(s): short of breath, rash    Penicillins Hives    Opioids - morphine analogues Palpitations     Past Medical History:   Diagnosis Date    Hypertension     NSTEMI (non-ST elevated myocardial infarction)      Past Surgical History:   Procedure Laterality Date    ANGIOGRAPHY      CHOLECYSTECTOMY      INTRAVASCULAR ULTRASOUND, CORONARY N/A 1/27/2023    Procedure: Ultrasound-coronary;  Surgeon: Evan Trejo MD;  Location: [facility] CATH LAB;  Service: Cardiology;  Laterality: N/A;    LEFT  HEART CATHETERIZATION N/A 1/27/2023    Procedure: Left heart cath;  Surgeon: Evan Trejo MD;  Location: [facility] CATH LAB;  Service: Cardiology;  Laterality: N/A;    STENT, DRUG ELUTING, SINGLE VESSEL, CORONARY  1/27/2023    Procedure: Stent, Drug Eluting, Single Vessel, Coronary;  Surgeon: Evan Trejo MD;  Location: [facility] CATH LAB;  Service: Cardiology;;     Family History   Problem Relation Age of Onset    Diabetes Mother     Heart disease Mother     Diabetes Father     Hypertension Father     Heart disease Father     Cancer Father     Febrile seizures Father      Social History     Tobacco Use    Smoking status: Every Day     Packs/day: 1.00     Types: Cigarettes    Smokeless tobacco: Never   Substance Use Topics    Alcohol use: Not Currently    Drug use: Never     Review of Systems   Constitutional:  Negative for chills, fatigue and fever.   HENT:  Negative for rhinorrhea and sore throat.    Eyes:  Negative for photophobia and visual disturbance.   Respiratory:  Negative for cough, shortness of breath, wheezing and stridor.    Cardiovascular:  Negative for chest pain and leg swelling.   Gastrointestinal:  Positive for abdominal pain. Negative for nausea and vomiting.   Genitourinary:  Positive for flank pain and hematuria. Negative for dysuria.   Musculoskeletal:  Positive for back pain. Negative for gait problem and myalgias.   Neurological:  Negative for headaches.   All other systems reviewed and are negative.    Physical Exam     Initial Vitals [03/14/23 1302]   BP Pulse Resp Temp SpO2   (!) 154/91 74 18 98.6 °F (37 °C) 97 %      MAP       --         Physical Exam    Nursing note and vitals reviewed.  Constitutional: She appears well-developed and well-nourished. She is not diaphoretic. She is cooperative.  Non-toxic appearance. No distress.   HENT:   Head: Normocephalic and atraumatic.   Right Ear: External ear normal.   Left Ear: External ear normal.   Nose: Nose normal.   Mouth/Throat: Oropharynx is  clear and moist. No oropharyngeal exudate.   Eyes: Conjunctivae and EOM are normal. Pupils are equal, round, and reactive to light. Right eye exhibits no discharge. Left eye exhibits no discharge.   Neck: Neck supple. No thyromegaly present. No JVD present.   Normal range of motion.  Cardiovascular:  Normal rate, regular rhythm, S1 normal, S2 normal, normal heart sounds, intact distal pulses and normal pulses.           Pulmonary/Chest: Effort normal and breath sounds normal. No accessory muscle usage or stridor. No tachypnea. No respiratory distress. She has no decreased breath sounds. She has no wheezes. She has no rhonchi. She has no rales. She exhibits no tenderness.   Abdominal: Abdomen is soft. Bowel sounds are normal. She exhibits no distension. There is abdominal tenderness in the right lower quadrant and left lower quadrant. There is no guarding.   Musculoskeletal:         General: Tenderness present. No edema. Normal range of motion.      Cervical back: Normal, normal range of motion and neck supple.      Thoracic back: Normal.      Lumbar back: Normal.        Back:       Comments: Tenderness noted to outlined area.      Neurological: She is alert and oriented to person, place, and time. She has normal strength. No sensory deficit. GCS score is 15. GCS eye subscore is 4. GCS verbal subscore is 5. GCS motor subscore is 6.   Skin: Skin is warm and dry. Capillary refill takes less than 2 seconds. No rash noted. No erythema.   Psychiatric: She has a normal mood and affect. Thought content normal.       ED Course   Procedures  Labs Reviewed   URINALYSIS, REFLEX TO URINE CULTURE - Abnormal; Notable for the following components:       Result Value    Glucose,  (*)     Blood, UA Small (*)     All other components within normal limits   COMPREHENSIVE METABOLIC PANEL - Abnormal; Notable for the following components:    Glucose Level 171 (*)     Albumin Level 3.4 (*)     Globulin 3.6 (*)     Albumin/Globulin  Ratio 0.9 (*)     Alkaline Phosphatase 177 (*)     All other components within normal limits   CBC WITH DIFFERENTIAL - Abnormal; Notable for the following components:    MCV 98.2 (*)     MCHC 31.8 (*)     All other components within normal limits   URINALYSIS, MICROSCOPIC - Abnormal; Notable for the following components:    Bacteria, UA Trace (*)     Mucous, UA Trace (*)     Squamous Epithelial Cells, UA Moderate (*)     All other components within normal limits   PREGNANCY TEST, URINE RAPID - Normal   CBC W/ AUTO DIFFERENTIAL    Narrative:     The following orders were created for panel order CBC auto differential.  Procedure                               Abnormality         Status                     ---------                               -----------         ------                     CBC with Differential[[id number]]        Abnormal            Final result                 Please view results for these tests on the individual orders.          Imaging Results              CT Abdomen Pelvis  Without Contrast (Final result)  Result time 03/14/23 14:17:18      Final result by Wyatt Mathew MD (03/14/23 14:17:18)                   Impression:      No acute abnormalities are demonstrated.      Electronically signed by: Wyatt Mathew MD  Date:    03/14/2023  Time:    14:17               Narrative:    EXAMINATION:  CT ABDOMEN PELVIS WITHOUT CONTRAST    CLINICAL HISTORY:  Flank pain, kidney stone suspected;    TECHNIQUE:  Low dose axial images, sagittal and coronal reformations were obtained from the lung bases to the pubic symphysis.  No oral contrast was given.    Automatic exposure control (AEC) was utilized for dose reduction.    Dose: 849 mGycm    COMPARISON:  10/02/2022    FINDINGS:  Are mild atelectatic changes in the left lung base liver appears normal.  Patient has had a previous cholecystectomy.  Spleen appears normal.  Pancreas appears normal.  The adrenals are not enlarged.  No nephrolithiasis is seen.  No  hydronephrosis is seen.  No distal ureteral lithiasis is noted.  Aorta shows calcification without an aneurysm present.  There is an appendicoliths present however there is no evidence of acute appendicitis.  Uterus appears normal.  There are diverticulum in the colon without evidence of acute diverticulitis                                       Medications   HYDROcodone-acetaminophen 5-325 mg per tablet 1 tablet (1 tablet Oral Given 3/14/23 8045)       Medical Decision Making:   Initial Assessment:   Patient awake, alert, has non-labored breathing, and follows commands appropriately. C/o back pain that radiates to her lower abdomen, R flank pain, and hematuria. Symptoms began 1 month ago. Patient has seen PCP regarding issues. NAD noted.   Differential Diagnosis:   Kidney Stone, Lumbar Strain, UTI, Hematuria   Clinical Tests:   Lab Tests: Ordered and Reviewed  Radiological Study: Ordered and Reviewed  ED Management:  Co-morbidities and/or factors adding to the complexity or risk for the patient?: none  Differential diagnoses: Kidney Stone, Lumbar Strain, UTI, Hematuria   Decision to obtain previous or outside records?: I did not review previous records.  Chart Review (nursing home, outside records, CareEverywhere): none  Review of RX medications/new RX prescribed by me?: Prescribed short course of Norco. Cautioned on side effects. Patient reports she is no longer on Suboxone.   Labs/imaging/other tests obtained/considered (risk/benefits of testing discussed): cbc, cmp, upt, ua, CT  Labs/tests intepretation: Alk Phos- 177 (trending near previous labs), CT-  No acute abnormalities are demonstrated. Informed patient of results.   My independent imaging interpretation: none  Treatment/interventions, IV fluids, IV medications: Norco given in ER. NSAIDs avoided due to patient currently taking Plavix.   Complex management (IV controlled substances, went to OR, DNR, meds requiring monitoring, transfer, etc)?:  none  Workup/treatment affected by social determinants of health?:none   Point of care US done/interpretation: none  Consults/radiologist/EMS/social work/family discussion/alternate history: none  Advanced care planning/end of life discussion: none  Shared decision making: Discussed plan of care and interventions with patient. Agreed to and aware of plan of care. Comfortable being discharged home.   ETOH/smoking/drug cessation discussion: none  Dispo: Patient discharged home. Patient denies new or additional complaints; no further tests indicated at this time. Verbalized understanding of instructions. No emergent or apparent distress noted prior to discharge. To follow up with PCP in 1 week as needed. Strict ER return precautions given.       Other:   I discussed test(s) with the performing physician.       <> Summary of the Findings: Discussed patient and pain management with Dr. Mariscal; reports short course of opioids appropriate.                         Clinical Impression:   Final diagnoses:  [M54.9] Acute right-sided back pain, unspecified back location (Primary)  [R10.9] Flank pain  [R31.9] Hematuria, unspecified type        ED Disposition Condition    Discharge Stable          ED Prescriptions       Medication Sig Dispense Start Date End Date Auth. Provider    HYDROcodone-acetaminophen (NORCO) 5-325 mg per tablet Take 1 tablet by mouth every 6 (six) hours as needed for Pain. 8 tablet 3/14/2023 -- Gina Reilly NP          Follow-up Information       Follow up With Specialties Details Why Contact Info    WILIAM Doyle Nurse Practitioner Call in 1 week As needed, If symptoms worsen [address] [facility] [address]  [phone]      [facility] - Emergency Dept Emergency Medicine Go to  If symptoms worsen, As needed [address] [facility] [id number] [phone]             Gina Reilly NP  03/14/23 4138

## 2023-03-21 ENCOUNTER — OFFICE VISIT (OUTPATIENT)
Dept: INTERNAL MEDICINE | Facility: CLINIC | Age: 48
End: 2023-03-21
Payer: MEDICAID

## 2023-03-21 VITALS
WEIGHT: 237 LBS | RESPIRATION RATE: 16 BRPM | BODY MASS INDEX: 38.09 KG/M2 | TEMPERATURE: 98 F | HEART RATE: 64 BPM | SYSTOLIC BLOOD PRESSURE: 136 MMHG | HEIGHT: 66 IN | DIASTOLIC BLOOD PRESSURE: 78 MMHG

## 2023-03-21 DIAGNOSIS — E55.9 VITAMIN D DEFICIENCY: Primary | ICD-10-CM

## 2023-03-21 DIAGNOSIS — K21.9 GASTROESOPHAGEAL REFLUX DISEASE, UNSPECIFIED WHETHER ESOPHAGITIS PRESENT: ICD-10-CM

## 2023-03-21 DIAGNOSIS — F41.8 DEPRESSION WITH ANXIETY: ICD-10-CM

## 2023-03-21 DIAGNOSIS — I10 PRIMARY HYPERTENSION: ICD-10-CM

## 2023-03-21 DIAGNOSIS — E78.2 MIXED HYPERLIPIDEMIA: ICD-10-CM

## 2023-03-21 DIAGNOSIS — R73.03 PREDIABETES: ICD-10-CM

## 2023-03-21 DIAGNOSIS — Z12.31 ENCOUNTER FOR SCREENING MAMMOGRAM FOR MALIGNANT NEOPLASM OF BREAST: ICD-10-CM

## 2023-03-21 DIAGNOSIS — Z12.11 COLON CANCER SCREENING: ICD-10-CM

## 2023-03-21 DIAGNOSIS — F17.210 CIGARETTE NICOTINE DEPENDENCE WITHOUT COMPLICATION: ICD-10-CM

## 2023-03-21 PROCEDURE — 4010F PR ACE/ARB THEARPY RXD/TAKEN: ICD-10-PCS | Mod: CPTII,,, | Performed by: NURSE PRACTITIONER

## 2023-03-21 PROCEDURE — 1160F RVW MEDS BY RX/DR IN RCRD: CPT | Mod: CPTII,,, | Performed by: NURSE PRACTITIONER

## 2023-03-21 PROCEDURE — 3075F PR MOST RECENT SYSTOLIC BLOOD PRESS GE 130-139MM HG: ICD-10-PCS | Mod: CPTII,,, | Performed by: NURSE PRACTITIONER

## 2023-03-21 PROCEDURE — 3075F SYST BP GE 130 - 139MM HG: CPT | Mod: CPTII,,, | Performed by: NURSE PRACTITIONER

## 2023-03-21 PROCEDURE — 3066F NEPHROPATHY DOC TX: CPT | Mod: CPTII,,, | Performed by: NURSE PRACTITIONER

## 2023-03-21 PROCEDURE — 99406 BEHAV CHNG SMOKING 3-10 MIN: CPT | Mod: S$PBB,,, | Performed by: NURSE PRACTITIONER

## 2023-03-21 PROCEDURE — 3008F PR BODY MASS INDEX (BMI) DOCUMENTED: ICD-10-PCS | Mod: CPTII,,, | Performed by: NURSE PRACTITIONER

## 2023-03-21 PROCEDURE — 99214 OFFICE O/P EST MOD 30 MIN: CPT | Mod: S$PBB,25,, | Performed by: NURSE PRACTITIONER

## 2023-03-21 PROCEDURE — 3066F PR DOCUMENTATION OF TREATMENT FOR NEPHROPATHY: ICD-10-PCS | Mod: CPTII,,, | Performed by: NURSE PRACTITIONER

## 2023-03-21 PROCEDURE — 4010F ACE/ARB THERAPY RXD/TAKEN: CPT | Mod: CPTII,,, | Performed by: NURSE PRACTITIONER

## 2023-03-21 PROCEDURE — 1160F PR REVIEW ALL MEDS BY PRESCRIBER/CLIN PHARMACIST DOCUMENTED: ICD-10-PCS | Mod: CPTII,,, | Performed by: NURSE PRACTITIONER

## 2023-03-21 PROCEDURE — 99215 OFFICE O/P EST HI 40 MIN: CPT | Mod: PBBFAC | Performed by: NURSE PRACTITIONER

## 2023-03-21 PROCEDURE — 3008F BODY MASS INDEX DOCD: CPT | Mod: CPTII,,, | Performed by: NURSE PRACTITIONER

## 2023-03-21 PROCEDURE — 1159F MED LIST DOCD IN RCRD: CPT | Mod: CPTII,,, | Performed by: NURSE PRACTITIONER

## 2023-03-21 PROCEDURE — 99406 PR TOBACCO USE CESSATION INTERMEDIATE 3-10 MINUTES: ICD-10-PCS | Mod: S$PBB,,, | Performed by: NURSE PRACTITIONER

## 2023-03-21 PROCEDURE — 3061F NEG MICROALBUMINURIA REV: CPT | Mod: CPTII,,, | Performed by: NURSE PRACTITIONER

## 2023-03-21 PROCEDURE — 3061F PR NEG MICROALBUMINURIA RESULT DOCUMENTED/REVIEW: ICD-10-PCS | Mod: CPTII,,, | Performed by: NURSE PRACTITIONER

## 2023-03-21 PROCEDURE — 3078F PR MOST RECENT DIASTOLIC BLOOD PRESSURE < 80 MM HG: ICD-10-PCS | Mod: CPTII,,, | Performed by: NURSE PRACTITIONER

## 2023-03-21 PROCEDURE — 99214 PR OFFICE/OUTPT VISIT, EST, LEVL IV, 30-39 MIN: ICD-10-PCS | Mod: S$PBB,25,, | Performed by: NURSE PRACTITIONER

## 2023-03-21 PROCEDURE — 1159F PR MEDICATION LIST DOCUMENTED IN MEDICAL RECORD: ICD-10-PCS | Mod: CPTII,,, | Performed by: NURSE PRACTITIONER

## 2023-03-21 PROCEDURE — 3078F DIAST BP <80 MM HG: CPT | Mod: CPTII,,, | Performed by: NURSE PRACTITIONER

## 2023-03-21 RX ORDER — HYDROXYZINE HYDROCHLORIDE 50 MG/1
50 TABLET, FILM COATED ORAL 3 TIMES DAILY
COMMUNITY
Start: 2023-02-28

## 2023-03-21 RX ORDER — METOPROLOL SUCCINATE 50 MG/1
50 TABLET, EXTENDED RELEASE ORAL DAILY
Qty: 90 TABLET | Refills: 1 | Status: SHIPPED | OUTPATIENT
Start: 2023-03-21 | End: 2023-09-17

## 2023-03-21 RX ORDER — ATORVASTATIN CALCIUM 40 MG/1
40 TABLET, FILM COATED ORAL DAILY
Qty: 90 TABLET | Refills: 1 | Status: SHIPPED | OUTPATIENT
Start: 2023-03-21 | End: 2023-09-17

## 2023-03-21 RX ORDER — FAMOTIDINE 20 MG/1
20 TABLET, FILM COATED ORAL 2 TIMES DAILY
Qty: 60 TABLET | Refills: 0 | Status: SHIPPED | OUTPATIENT
Start: 2023-03-21 | End: 2023-04-20

## 2023-03-21 RX ORDER — ERGOCALCIFEROL 1.25 MG/1
50000 CAPSULE ORAL
Qty: 4 CAPSULE | Refills: 1 | Status: SHIPPED | OUTPATIENT
Start: 2023-03-21 | End: 2023-05-10

## 2023-03-21 RX ORDER — NICOTINE 21 MG/24H
1 PATCH, EXTENDED RELEASE TRANSDERMAL
COMMUNITY
Start: 2023-02-28

## 2023-03-21 RX ORDER — AMLODIPINE BESYLATE 10 MG/1
10 TABLET ORAL NIGHTLY
Qty: 90 TABLET | Refills: 1 | Status: SHIPPED | OUTPATIENT
Start: 2023-03-21 | End: 2023-09-23 | Stop reason: SDUPTHER

## 2023-03-21 RX ORDER — CLOPIDOGREL BISULFATE 75 MG/1
75 TABLET ORAL
COMMUNITY
Start: 2023-02-27

## 2023-03-21 NOTE — ASSESSMENT & PLAN NOTE
Assessment/Plan:   Diagnoses and all orders for this visit:    Benign essential hypertension  -     Comprehensive metabolic panel; Future  -     Microalbumin / creatinine urine ratio  - BP in the office 138/80 and 126/84 on my repeat   - currently on Lopressor 50mg BID and tolerating it well - cont current regimen   - has not been checking pressures at home   - (+) FHx of HTN in both parents   - BMP (4/16/2021) with nml renal function   - caution with NSAIDs  - limit Na to 2g/day   - RTO in 6months with labs for f/u and annual exam - pt aware and agreeable     BMI 34 0-34 9,adult  -     TSH, 3rd generation with Free T4 reflex  - nml FBS on BMP   - BMI 34 2   - discussed diet and encouraged exercise  - educated that it takes 3500 abelardo to lose 1lb  - advised to cut down on carbs, 5 servings of fruits/veggies/day, increase water intake (65-80oz/water/day)   - appropriate weight loss goal for men = 1-2lb/week  - per the [employer] Association - 150mins/exercise/week, but to lose and maintain weight 200-300mins/exercise/week   - RTO in 6months for f/u  - pt declined referral to [facility]     Mixed hyperlipidemia  -     Lipid panel; Future  - unable to tolerate statins or tricor 2/2 confusion   - currently using Fish Oil and Red Yeast Rice   - Lipids (4/16/2021): , , HD 39,    - Lipid (10/2020): , , HDL 44,   - Lipid (6/2019): , , HDL 39,   - discussed diet and encouraged exercise   - The 10-year ASCVD risk score (Reynaldo Moore et al , 2013) is: 10 2%    Values used to calculate the score:      Age: 61 years      Sex: Male      Is Non- : No      Diabetic: No      Tobacco smoker: No      Systolic Blood Pressure: 514 mmHg      Is BP treated: Yes      HDL Cholesterol: 39 mg/dL      Total Cholesterol: 187 mg/dL    Need for hepatitis C screening test  -     Hepatitis C antibody;  Future    Elevated PSA  - follows with [facility] Continue RX amlodipine 10 mg q hs  Continue RX metoprolol 50 mg      Subjective:    Patient ID: Loreta Sidhu is a 61 y o  male  HPI   63yo M presents to the office for f/u   - of note, received Moderna vaccine - #2/2 4/22/2021  1) HTN  - BP in the office 138/80 and 126/84 on my repeat   - currently on Lopressor 50mg BID and tolerating it well   - has not been checking pressures at home   - (+) FHx of HTN in both parents   - no "head humming"   - today in the office pt denies F/C/N/V/HA/visual changes/CP/palpitations/SOB/wheezing/abd pain/D/C/LE edema  - BMP with nml renal function   2) HL   - unable to tolerate statins or tricor 2/2 confusion   - currently using Fish Oil and Red Yeast Rice   - Lipids (4/16/2021): , , HD 39,    - Lipid (10/2020): , , HDL 44,   - Lipid (6/2019): , , HDL 39,   - has not been exercising regularly but will be out more riding his bike       The following portions of the patient's history were reviewed and updated as appropriate: allergies, current medications, past family history, past medical history, past social history, past surgical history and problem list     Review of Systems  as per HPI    Objective:  /84 (BP Location: Left arm, Patient Position: Sitting, Cuff Size: Large)   Pulse 58   Resp 16   Ht 5' 3" (1 6 m)   Wt 87 5 kg (193 lb)   SpO2 98%   BMI 34 19 kg/m²    Physical Exam  Vitals signs reviewed  Constitutional:       General: He is not in acute distress  Appearance: He is obese  He is not ill-appearing, toxic-appearing or diaphoretic  HENT:      Head: Normocephalic and atraumatic  Right Ear: External ear normal       Left Ear: External ear normal    Eyes:      General: No scleral icterus  Right eye: No discharge  Left eye: No discharge  Extraocular Movements: Extraocular movements intact  Conjunctiva/sclera: Conjunctivae normal    Neck:      Musculoskeletal: Normal range of motion and neck supple     Cardiovascular:      Rate and Rhythm: Normal rate and regular rhythm  Heart sounds: Normal heart sounds  No murmur  No friction rub  No gallop  Pulmonary:      Effort: Pulmonary effort is normal  No respiratory distress  Breath sounds: Normal breath sounds  No stridor  No wheezing, rhonchi or rales  Abdominal:      General: Bowel sounds are normal       Palpations: Abdomen is soft  Musculoskeletal: Normal range of motion  Right lower leg: No edema  Left lower leg: No edema  Skin:     General: Skin is warm  Neurological:      General: No focal deficit present  Mental Status: He is alert and oriented to person, place, and time  Psychiatric:         Mood and Affect: Mood normal          Behavior: Behavior normal        BMI Counseling: Body mass index is 34 19 kg/m²  The BMI is above normal  Nutrition recommendations include reducing portion sizes and decreasing overall calorie intake  Exercise recommendations include moderate aerobic physical activity for 150 minutes/week, exercising 3-5 times per week, joining a gym and strength training exercises  Depression Screening Follow-up Plan: Patient's depression screening was positive with a PHQ-2 score of 0  Their PHQ-9 score was   Clinically patient does not have depression  No treatment is required

## 2023-03-21 NOTE — PROGRESS NOTES
Renetta Avitia, WILIAM   [facility]  [address] [address]      PATIENT NAME: Serge Krishnamurthy  : 1975  DATE: 3/21/23  MRN: [id number]      Reason for Visit / Chief Complaint: Follow-up (Hypertension and lab review )       History of Present Illness / Problem Focused Workflow     Serge Krishnamurthy presents to the clinic with Follow-up (Hypertension and lab review )     48 yo WF here to establish care. PMH NSTEMI / CAD s/p PCI on , prediabetes, GERD, insomnia, depression with anxiety, & tobacco use.   Followed by Evan Trejo for Cardiology Services & Suboxone clinic. Followed by Behavioral health.      Cervical Cancer Screenin/15/23   Colon Cancer Screenin/15/23 Cologuard Ordered  Breast Cancer Screenin/15/23  Osteoporosis Screenin/15/23  HCV Screenin/15/23     02/15/23  Pt here today to establish care, recent NSTEMI, was seen by Dr. Trejo 2 days ago for Cardiology f/u, mediation changes occurs; pt BP is elevated today, is only on losartan 100 mg daily, will start amlodipine 10 mg every evening, was recently on it but stopped because she though it was causing swelling but reports that has not changed. pt will come back Friday & next week for a BP check. We will fax over today's note to that clinic so they are aware. Discussed with patient obtaining a blood pressure cuff at home, we were able to sign patient up for Digital Medicine today, she has an appt next Wednesday to discuss and we will f/u on Thursday. Pt reports is currently receiving suboxone, fluoxetine, and buspirone from provider at Suboxone clinic - reports she does not feel the busporone is working as well, will discuss with them but request new referral, will send referral to Dr. Peña.   Pt agreeable to MMG, GYN, Cologuard, & Smoking Cessation today.     2023  Pt here for f/u today, labs discussed with the patient , discussed low Vitamin  D, will start RX Vitamin D then start OTC vitamin d after, discussed prediabetes status as well. Pt meds reviewed and refilled approprietly. Reordered MMG and cologaurd. Pt aware of all upcoming appts. Will f/u in 6 months with labs.   Denies chest pain, shortness of breath, cough, fever, headache, dizziness, weakness, abdominal pain, nausea, vomiting, diarrhea, constipation, black/bloody stools, unplanned weight loss, night sweats, changes in urinary patterns, burning/odor with urination, depression, anxiety, and SI/HI.       Follow-up        Review of Systems     Review of Systems   Constitutional: Negative.    HENT: Negative.     Eyes: Negative.    Respiratory: Negative.     Cardiovascular: Negative.    Gastrointestinal: Negative.    Endocrine: Negative.    Genitourinary: Negative.    Neurological: Negative.    Psychiatric/Behavioral: Negative.         Medications and Allergies     Medications  Medication List with Changes/Refills   New Medications    ERGOCALCIFEROL (ERGOCALCIFEROL) 50,000 UNIT CAP    Take 1 capsule (50,000 Units total) by mouth every 7 days. For Low Vitamin D for 8 doses   Current Medications    ASPIRIN (ECOTRIN) 81 MG EC TABLET    Take 1 tablet (81 mg total) by mouth once daily.    BUSPIRONE (BUSPAR) 15 MG TABLET    Take 15 mg by mouth 3 (three) times daily.    CLOPIDOGREL (PLAVIX) 75 MG TABLET    Take 75 mg by mouth.    DICLOFENAC SODIUM (VOLTAREN) 1 % GEL    Apply 2 g topically 4 (four) times daily.    FLUOXETINE 20 MG CAPSULE    Take 20 mg by mouth once daily.    GABAPENTIN (NEURONTIN) 300 MG CAPSULE    Take 1 capsule (300 mg total) by mouth 3 (three) times daily. for 7 days    HYDROCODONE-ACETAMINOPHEN (NORCO) 5-325 MG PER TABLET    Take 1 tablet by mouth every 6 (six) hours as needed for Pain.    HYDROXYZINE (ATARAX) 50 MG TABLET    Take 50 mg by mouth 3 (three) times daily.    LOSARTAN (COZAAR) 100 MG TABLET    Take 100 mg by mouth once daily.    NALOXONE (NARCAN) 4 MG/ACTUATION SPRY         NICODERM CQ 21 MG/24 HR    1 patch.    NITROGLYCERIN (NITROSTAT) 0.4 MG SL TABLET    Place 1 tablet (0.4 mg total) under the tongue every 5 (five) minutes as needed for Chest pain.    TRAZODONE (DESYREL) 100 MG TABLET    Take 100 mg by mouth nightly.    ZUBSOLV 5.7-1.4 MG SUBL    Place 1 tablet under the tongue 2 (two) times daily.   Changed and/or Refilled Medications    Modified Medication Previous Medication    AMLODIPINE (NORVASC) 10 MG TABLET amLODIPine (NORVASC) 10 MG tablet       Take 1 tablet (10 mg total) by mouth every evening. For High Blood Pressure    Take 1 tablet (10 mg total) by mouth every evening. For High Blood Pressure    ATORVASTATIN (LIPITOR) 40 MG TABLET atorvastatin (LIPITOR) 40 MG tablet       Take 1 tablet (40 mg total) by mouth once daily. For High Cholesterol    Take 1 tablet (40 mg total) by mouth once daily.    FAMOTIDINE (PEPCID) 20 MG TABLET famotidine (PEPCID) 20 MG tablet       Take 1 tablet (20 mg total) by mouth 2 (two) times daily.    Take 1 tablet (20 mg total) by mouth 2 (two) times daily.    METOPROLOL SUCCINATE (TOPROL-XL) 50 MG 24 HR TABLET metoprolol succinate (TOPROL-XL) 50 MG 24 hr tablet       Take 1 tablet (50 mg total) by mouth once daily.    Take 1 tablet (50 mg total) by mouth once daily.         Allergies  Review of patient's allergies indicates:   Allergen Reactions    Lisinopril     Opioids - morphine analogues Other (See Comments)     Increase heart rate    Penicillins      Other reaction(s): short of breath, rash    Penicillins Hives    Opioids - morphine analogues Palpitations       Physical Examination     Vitals:    03/21/23 1322   BP: 136/78   Pulse:    Resp:    Temp:      Physical Exam  Vitals reviewed.   Constitutional:       Appearance: Normal appearance. She is normal weight.   HENT:      Head: Normocephalic.   Cardiovascular:      Rate and Rhythm: Normal rate and regular rhythm.      Pulses: Normal pulses.      Heart sounds: Normal heart sounds.    Pulmonary:      Effort: Pulmonary effort is normal.      Breath sounds: Normal breath sounds.   Abdominal:      General: Abdomen is flat.      Palpations: Abdomen is soft.   Musculoskeletal:         General: Normal range of motion.      Cervical back: Normal range of motion.   Skin:     General: Skin is warm and dry.   Neurological:      Mental Status: She is alert.   Psychiatric:         Mood and Affect: Mood normal.         Results     Lab Results   Component Value Date    WBC 6.9 03/14/2023    RBC 4.39 03/14/2023    HGB 13.7 03/14/2023    HCT 43.1 03/14/2023    MCV 98.2 (H) 03/14/2023    MCH 31.2 03/14/2023    MCHC 31.8 (L) 03/14/2023    RDW 14.4 03/14/2023     03/14/2023    MPV 9.3 03/14/2023     Sodium Level   Date Value Ref Range Status   03/14/2023 139 136 - 145 mmol/L Final     Potassium Level   Date Value Ref Range Status   03/14/2023 4.6 3.5 - 5.1 mmol/L Final     Carbon Dioxide   Date Value Ref Range Status   03/14/2023 23 22 - 29 mmol/L Final     Blood Urea Nitrogen   Date Value Ref Range Status   03/14/2023 8.4 7.0 - 18.7 mg/dL Final     Creatinine   Date Value Ref Range Status   03/14/2023 0.85 0.55 - 1.02 mg/dL Final     Calcium Level Total   Date Value Ref Range Status   03/14/2023 8.8 8.4 - 10.2 mg/dL Final     Albumin Level   Date Value Ref Range Status   03/14/2023 3.4 (L) 3.5 - 5.0 g/dL Final     Bilirubin Total   Date Value Ref Range Status   03/14/2023 0.2 <=1.5 mg/dL Final     Alkaline Phosphatase   Date Value Ref Range Status   03/14/2023 177 (H) 40 - 150 unit/L Final     Aspartate Aminotransferase   Date Value Ref Range Status   03/14/2023 12 5 - 34 unit/L Final     Alanine Aminotransferase   Date Value Ref Range Status   03/14/2023 15 0 - 55 unit/L Final     Estimated GFR-Non    Date Value Ref Range Status   04/18/2021 86 (L) >>=90 mL/min/1.73 m2 Final     Lab Results   Component Value Date    CHOL 118 02/15/2023     Lab Results   Component Value Date    HDL 36  02/15/2023     No results found for: LDLCALC  Lab Results   Component Value Date    TRIG 123 02/15/2023     No results found for: CHOLHDL  Lab Results   Component Value Date    TSH 3.279 02/15/2023     Lab Results   Component Value Date    PHUR 6.0 10/07/2020    PROTEINUA Negative 03/14/2023    GLUCUA Negative 10/07/2020    KETONESU Negative 10/07/2020    OCCULTUA 1+ (A) 10/07/2020    NITRITE Negative 10/07/2020    LEUKOCYTESUR Negative 03/14/2023     Lab Results   Component Value Date    HGBA1C 5.9 02/15/2023    HGBA1C 6.1 01/27/2023           Assessment         ICD-10-CM ICD-9-CM   1. Vitamin D deficiency  E55.9 268.9   2. Prediabetes  R73.03 790.29   3. Primary hypertension  I10 401.9   4. Mixed hyperlipidemia  E78.2 272.2   5. Gastroesophageal reflux disease, unspecified whether esophagitis present  K21.9 530.81   6. Encounter for screening mammogram for malignant neoplasm of breast  Z12.31 V76.12   7. Colon cancer screening  Z12.11 V76.51   8. Cigarette nicotine dependence without complication  F17.210 305.1   9. Depression with anxiety  F41.8 300.4       Plan      Problem List Items Addressed This Visit          Psychiatric    Depression with anxiety    Overview     Read positive daily meditations, avoid negative media, set healthy boundaries.  Exercise daily, keep consistent sleep pattern, eat a healthy diet.  Establish good social support, make changes to reduce stress.  Do not drink alcohol or use illicit drugs.  Reports any symptoms of suicidal/homicidal ideations or self harm immediately, go to nearest emergency room.           Current Assessment & Plan     Continue medications as prescribed  Keep f/u with Dr. Peña as directed            Cardiac/Vascular    Primary hypertension    Overview     Continue medications as prescribed  Read positive daily meditations, avoid negative media, set healthy boundaries.  Exercise daily, keep consistent sleep pattern, eat a healthy diet.  Establish good social support,  make changes to reduce stress.  Do not drink alcohol or use illicit drugs.  Reports any symptoms of suicidal/homicidal ideations or self harm immediately, go to nearest emergency room.           Current Assessment & Plan     Continue RX amlodipine 10 mg q hs  Continue RX metoprolol 50 mg            Relevant Medications    amLODIPine (NORVASC) 10 MG tablet    metoprolol succinate (TOPROL-XL) 50 MG 24 hr tablet    Other Relevant Orders    Urinalysis, Reflex to Urine Culture    Lipid Panel    CBC Auto Differential    Mixed hyperlipidemia    Overview      Follow a low cholesterol, low saturated fat diet with less than 200 mg of cholesterol a day.   Avoid fried foods and high saturated fats (boudreaux, sausage, cookies, cakes, chips, cheese, whole milk, butter, mayonnaise, creamy dressings, gravy, stew, gumbo, boudin, cracklins and cream sauces).  Add flax seed or fish oil supplements to diet.   Increase dietary fiber.   Regular exercise improves cholesterol levels.  Physical activity 5 times a week for 30 minutes per day (or 150 minutes per week).   Stressed importance of dietary modifications.           Current Assessment & Plan     Continue RX Atorvastatin 40 mg q hs             Relevant Medications    atorvastatin (LIPITOR) 40 MG tablet    Other Relevant Orders    Lipid Panel    Comprehensive Metabolic Panel    CBC Auto Differential       Endocrine    Vitamin D deficiency - Primary    Overview     Educated on increasing foods high in Vitamin D such as fish oil, cod liver oil, salmon, milk fortified with vitamin D.         Current Assessment & Plan     RX Vitamin D3 69785 IU weekly x 12 weeks.  Complete entire 12 weeks of Vitamin D prescription.  After completion of prescription (12 weeks/3 months), begin taking Vitamin D 2000 I.U. tablets daily (purchase over the counter).  Repeat Vitamin D level as ordered.           Relevant Medications    ergocalciferol (ERGOCALCIFEROL) 50,000 unit Cap    Prediabetes    Relevant  Orders    Hemoglobin A1C       GI    Gastroesophageal reflux disease    Relevant Medications    famotidine (PEPCID) 20 MG tablet       Other    Cigarette nicotine dependence without complication    Current Assessment & Plan     Smoking cessation discussed > 3 mins.  Pt not ready to quit.  Discussed benefits of quitting including improved health, decreased cardiac/vascular/pulmonary/stroke risks as well as saving money.            Other Visit Diagnoses       Encounter for screening mammogram for malignant neoplasm of breast        Relevant Orders    Mammo Digital Screening Bilat w/ Uday    Colon cancer screening        Relevant Orders    Cologuard Screening (Multitarget Stool DNA)            Future Appointments   Date Time Provider Department Center   6/15/2023  1:00 PM Kaushik Peña MD [facility]   9/21/2023 12:00 PM WILIAM Doyle [facility]   7/31/2024  1:00 PM WILIAM Amezcua [facility]            Signature:     [facility]  [address] [address]    Date of encounter: 3/21/23

## 2023-03-21 NOTE — ASSESSMENT & PLAN NOTE
RX Vitamin D3 62567 IU weekly x 12 weeks.  Complete entire 12 weeks of Vitamin D prescription.  After completion of prescription (12 weeks/3 months), begin taking Vitamin D 2000 I.U. tablets daily (purchase over the counter).  Repeat Vitamin D level as ordered.

## 2023-03-28 ENCOUNTER — DOCUMENTATION ONLY (OUTPATIENT)
Dept: ADMINISTRATIVE | Facility: HOSPITAL | Age: 48
End: 2023-03-28
Payer: MEDICAID

## 2023-05-01 PROBLEM — I21.4 NSTEMI (NON-ST ELEVATED MYOCARDIAL INFARCTION): Status: RESOLVED | Noted: 2023-01-27 | Resolved: 2023-05-01

## 2023-05-04 ENCOUNTER — HOSPITAL ENCOUNTER (EMERGENCY)
Facility: HOSPITAL | Age: 48
Discharge: HOME | End: 2023-05-04
Attending: EMERGENCY MEDICINE
Payer: MEDICAID

## 2023-05-04 VITALS
OXYGEN SATURATION: 94 % | BODY MASS INDEX: 36.96 KG/M2 | RESPIRATION RATE: 16 BRPM | WEIGHT: 230 LBS | HEART RATE: 64 BPM | HEIGHT: 66 IN | DIASTOLIC BLOOD PRESSURE: 84 MMHG | TEMPERATURE: 98 F | SYSTOLIC BLOOD PRESSURE: 127 MMHG

## 2023-05-04 DIAGNOSIS — M25.512 LEFT SHOULDER PAIN: ICD-10-CM

## 2023-05-04 DIAGNOSIS — R07.9 CHEST PAIN, UNSPECIFIED TYPE: Primary | ICD-10-CM

## 2023-05-04 DIAGNOSIS — M25.512 ACUTE PAIN OF LEFT SHOULDER: ICD-10-CM

## 2023-05-04 LAB
ALBUMIN SERPL-MCNC: 3.8 G/DL (ref 3.5–5)
ALBUMIN/GLOB SERPL: 1.2 RATIO (ref 1.1–2)
ALP SERPL-CCNC: 170 UNIT/L (ref 40–150)
ALT SERPL-CCNC: 15 UNIT/L (ref 0–55)
AST SERPL-CCNC: 14 UNIT/L (ref 5–34)
BASOPHILS # BLD AUTO: 0.04 X10(3)/MCL
BASOPHILS NFR BLD AUTO: 0.5 %
BILIRUBIN DIRECT+TOT PNL SERPL-MCNC: 0.2 MG/DL
BUN SERPL-MCNC: 10.3 MG/DL (ref 7–18.7)
CALCIUM SERPL-MCNC: 9.5 MG/DL (ref 8.4–10.2)
CHLORIDE SERPL-SCNC: 107 MMOL/L (ref 98–107)
CO2 SERPL-SCNC: 24 MMOL/L (ref 22–29)
CREAT SERPL-MCNC: 0.89 MG/DL (ref 0.55–1.02)
EOSINOPHIL # BLD AUTO: 0.17 X10(3)/MCL (ref 0–0.9)
EOSINOPHIL NFR BLD AUTO: 2 %
ERYTHROCYTE [DISTWIDTH] IN BLOOD BY AUTOMATED COUNT: 14.2 % (ref 11.5–17)
GFR SERPLBLD CREATININE-BSD FMLA CKD-EPI: >60 MLS/MIN/1.73/M2
GLOBULIN SER-MCNC: 3.3 GM/DL (ref 2.4–3.5)
GLUCOSE SERPL-MCNC: 126 MG/DL (ref 74–100)
HCT VFR BLD AUTO: 44.8 % (ref 37–47)
HGB BLD-MCNC: 14.4 G/DL (ref 12–16)
IMM GRANULOCYTES # BLD AUTO: 0.03 X10(3)/MCL (ref 0–0.04)
IMM GRANULOCYTES NFR BLD AUTO: 0.4 %
LYMPHOCYTES # BLD AUTO: 2.94 X10(3)/MCL (ref 0.6–4.6)
LYMPHOCYTES NFR BLD AUTO: 35.1 %
MCH RBC QN AUTO: 31.6 PG (ref 27–31)
MCHC RBC AUTO-ENTMCNC: 32.1 G/DL (ref 33–36)
MCV RBC AUTO: 98.2 FL (ref 80–94)
MONOCYTES # BLD AUTO: 0.51 X10(3)/MCL (ref 0.1–1.3)
MONOCYTES NFR BLD AUTO: 6.1 %
NEUTROPHILS # BLD AUTO: 4.68 X10(3)/MCL (ref 2.1–9.2)
NEUTROPHILS NFR BLD AUTO: 55.9 %
NRBC BLD AUTO-RTO: 0 %
PLATELET # BLD AUTO: 240 X10(3)/MCL (ref 130–400)
PMV BLD AUTO: 10 FL (ref 7.4–10.4)
POTASSIUM SERPL-SCNC: 5 MMOL/L (ref 3.5–5.1)
PROT SERPL-MCNC: 7.1 GM/DL (ref 6.4–8.3)
RBC # BLD AUTO: 4.56 X10(6)/MCL (ref 4.2–5.4)
SODIUM SERPL-SCNC: 138 MMOL/L (ref 136–145)
TROPONIN I SERPL-MCNC: <0.01 NG/ML (ref 0–0.04)
TROPONIN I SERPL-MCNC: <0.01 NG/ML (ref 0–0.04)
WBC # SPEC AUTO: 8.37 X10(3)/MCL (ref 4.5–11.5)

## 2023-05-04 PROCEDURE — 85025 COMPLETE CBC W/AUTO DIFF WBC: CPT | Performed by: EMERGENCY MEDICINE

## 2023-05-04 PROCEDURE — 99284 EMERGENCY DEPT VISIT MOD MDM: CPT

## 2023-05-04 PROCEDURE — 80053 COMPREHEN METABOLIC PANEL: CPT | Performed by: EMERGENCY MEDICINE

## 2023-05-04 PROCEDURE — 25000003 PHARM REV CODE 250: Performed by: EMERGENCY MEDICINE

## 2023-05-04 PROCEDURE — 93010 ELECTROCARDIOGRAM REPORT: CPT | Mod: ,,, | Performed by: INTERNAL MEDICINE

## 2023-05-04 PROCEDURE — 84484 ASSAY OF TROPONIN QUANT: CPT | Performed by: EMERGENCY MEDICINE

## 2023-05-04 PROCEDURE — 93010 EKG 12-LEAD: ICD-10-PCS | Mod: ,,, | Performed by: INTERNAL MEDICINE

## 2023-05-04 RX ORDER — GABAPENTIN 600 MG/1
600 TABLET ORAL 3 TIMES DAILY
Qty: 90 TABLET | Refills: 0 | OUTPATIENT
Start: 2023-05-04 | End: 2023-09-23

## 2023-05-04 RX ORDER — NAPROXEN SODIUM 220 MG/1
243 TABLET, FILM COATED ORAL
Status: COMPLETED | OUTPATIENT
Start: 2023-05-04 | End: 2023-05-04

## 2023-05-04 RX ORDER — ASPIRIN 325 MG
325 TABLET ORAL
Status: DISCONTINUED | OUTPATIENT
Start: 2023-05-04 | End: 2023-05-04

## 2023-05-04 RX ORDER — KETOROLAC TROMETHAMINE 10 MG/1
10 TABLET, FILM COATED ORAL EVERY 8 HOURS PRN
Qty: 14 TABLET | Refills: 0 | Status: SHIPPED | OUTPATIENT
Start: 2023-05-04

## 2023-05-04 RX ADMIN — ASPIRIN 81 MG 243 MG: 81 TABLET ORAL at 12:05

## 2023-05-04 NOTE — ED PROVIDER NOTES
Encounter Date: 5/4/2023    SCRIBE #1 NOTE: I, Radha Torres, am scribing for, and in the presence of,  Jesus Manuel Travis MD. I have scribed the following portions of the note - Other sections scribed: HPI, ROS, PE.     History     Chief Complaint   Patient presents with    Arm Pain     C/O L ARM/SHOULDER PAIN ONSET EARLIER TONIGHT. HX OF MI IN February. HAS CARDIAC STENTS.      47 year old female with history of HTN, NSTEMI, and cardiac stent presents to the ED with complaints of left arm pain onset 4-5 hours ago. Pt reports that she had a heart attack in February of this year that started off as left sided breast pain that eventually radiated to her chest. She notes that tonight when her arm pain began, she had palpitations for an hour. She also complains of SOB upon exertion since two days ago (5/2/23). She denies symptoms of chest pain, nausea, vomiting, and cough.     The history is provided by the patient. No  was used.   Shortness of Breath  This is a new problem. The problem occurs intermittently.The current episode started 2 days ago. The problem has not changed since onset.Pertinent negatives include no fever, no rhinorrhea, no sore throat, no ear pain, no cough, no wheezing, no chest pain, no vomiting, no abdominal pain, no rash and no leg swelling.   Review of patient's allergies indicates:   Allergen Reactions    Lisinopril     Opioids - morphine analogues Other (See Comments)     Increase heart rate    Penicillins      Other reaction(s): short of breath, rash    Penicillins Hives    Opioids - morphine analogues Palpitations     Past Medical History:   Diagnosis Date    Hypertension     NSTEMI (non-ST elevated myocardial infarction)      Past Surgical History:   Procedure Laterality Date    ANGIOGRAPHY      CHOLECYSTECTOMY      INTRAVASCULAR ULTRASOUND, CORONARY N/A 1/27/2023    Procedure: Ultrasound-coronary;  Surgeon: Evan Trejo MD;  Location: [facility] CATH LAB;  Service:  Cardiology;  Laterality: N/A;    LEFT HEART CATHETERIZATION N/A 1/27/2023    Procedure: Left heart cath;  Surgeon: Evan Trejo MD;  Location: [facility] CATH LAB;  Service: Cardiology;  Laterality: N/A;    STENT, DRUG ELUTING, SINGLE VESSEL, CORONARY  1/27/2023    Procedure: Stent, Drug Eluting, Single Vessel, Coronary;  Surgeon: Evan Trejo MD;  Location: [facility] CATH LAB;  Service: Cardiology;;     Family History   Problem Relation Age of Onset    Diabetes Mother     Heart disease Mother     Diabetes Father     Hypertension Father     Heart disease Father     Cancer Father     Febrile seizures Father      Social History     Tobacco Use    Smoking status: Every Day     Packs/day: 1.00     Types: Cigarettes    Smokeless tobacco: Never   Substance Use Topics    Alcohol use: Not Currently    Drug use: Never     Review of Systems   Constitutional:  Negative for chills, fatigue and fever.   HENT:  Negative for congestion, ear discharge, ear pain, postnasal drip, rhinorrhea, sinus pressure, sneezing, sore throat and voice change.    Eyes:  Negative for discharge and itching.   Respiratory:  Positive for shortness of breath. Negative for cough and wheezing.    Cardiovascular:  Positive for palpitations. Negative for chest pain and leg swelling.   Gastrointestinal:  Negative for abdominal pain, constipation, diarrhea, nausea and vomiting.   Endocrine: Negative for polydipsia, polyphagia and polyuria.   Genitourinary:  Negative for dysuria, frequency, hematuria, urgency, vaginal bleeding, vaginal discharge and vaginal pain.   Musculoskeletal:  Negative for arthralgias and myalgias.        Left arm pain    Skin:  Negative for rash and wound.   Neurological:  Negative for dizziness, seizures, syncope, weakness and numbness.   Hematological:  Negative for adenopathy. Does not bruise/bleed easily.   Psychiatric/Behavioral:  Negative for self-injury and suicidal ideas. The patient is not nervous/anxious.      Physical Exam      Initial Vitals [05/04/23 0014]   BP Pulse Resp Temp SpO2   (!) 168/88 65 18 98.4 °F (36.9 °C) 100 %      MAP       --         Physical Exam    Nursing note and vitals reviewed.  Constitutional: She appears well-developed and well-nourished.   Obese. No apparent distress.    HENT:   Head: Normocephalic and atraumatic.   Right Ear: External ear normal.   Left Ear: External ear normal.   Nose: Nose normal.   Eyes: Conjunctivae and EOM are normal. Pupils are equal, round, and reactive to light. Right eye exhibits no discharge. Left eye exhibits no discharge.   Neck:   Normal range of motion.  Cardiovascular:  Normal rate, regular rhythm and normal heart sounds.           Pulmonary/Chest: Breath sounds normal. No respiratory distress.   Abdominal: Abdomen is soft. She exhibits no distension.   Belly protuberant but soft    Musculoskeletal:         General: Normal range of motion.      Cervical back: Normal range of motion.      Comments: There is no pain on passive range of motion of the left shoulder.  Patient does have mild tenderness to palpation to the anterior aspect of the left shoulder.  No swelling.  No deformity.  Patient has a strong left radial pulse.     Neurological: She is alert and oriented to person, place, and time.   Skin: Skin is dry. Capillary refill takes less than 2 seconds.       ED Course   Procedures  Labs Reviewed   COMPREHENSIVE METABOLIC PANEL - Abnormal; Notable for the following components:       Result Value    Glucose Level 126 (*)     Alkaline Phosphatase 170 (*)     All other components within normal limits   CBC WITH DIFFERENTIAL - Abnormal; Notable for the following components:    MCV 98.2 (*)     MCH 31.6 (*)     MCHC 32.1 (*)     All other components within normal limits   TROPONIN I - Normal   TROPONIN I - Normal   CBC W/ AUTO DIFFERENTIAL    Narrative:     The following orders were created for panel order CBC auto differential.  Procedure                                Abnormality         Status                     ---------                               -----------         ------                     CBC with Differential[[id number]]        Abnormal            Final result                 Please view results for these tests on the individual orders.          Imaging Results    None          Medications   aspirin chewable tablet 243 mg (243 mg Oral Given 5/4/23 0057)     Medical Decision Making:   Initial Assessment:   As per HPI  Differential Diagnosis:   Acute coronary syndrome, musculoskeletal pain, pleurisy, right shoulder pain  Clinical Tests:   Lab Tests: Ordered and Reviewed       <> Summary of Lab: All lab stable, 2 sets of troponin within normal  ED Management:  Patient with a history of coronary artery disease and cardiac stents presenting with complain of left shoulder pain.  Cardiac workup was initiated and patient was given full-dose aspirin.  Two sets of cardiac enzymes are within normal.  At time of discharge, patient was sleeping and easily awakened.  She denies any complaints.  Will DC to home.          Attending Attestation:           Physician Attestation for Scribe:  Physician Attestation Statement for Scribe #1: I, Jesus Manuel Travis MD, reviewed documentation, as scribed by Radha Torres in my presence, and it is both accurate and complete.       Medical Decision Making  Amount and/or Complexity of Data Reviewed  External Data Reviewed: notes.  Labs: ordered.  Radiology: ordered and independent interpretation performed.  ECG/medicine tests: ordered and independent interpretation performed.            ED Course as of 05/04/23 0506   Thu May 04, 2023   0452 Patient is sleeping, she denies any complaints when awakened.  Second troponin also within normal. [KG]   0505 Patient requested that I write a prescription for gabapentin 600 mg tablets. [KG]      ED Course User Index  [KG] Jesus Manuel Travis MD                 Clinical Impression:   Final  diagnoses:  [M25.512] Left shoulder pain  [M25.512] Acute pain of left shoulder        ED Disposition Condition    Discharge Stable          ED Prescriptions       Medication Sig Dispense Start Date End Date Auth. Provider    ketorolac (TORADOL) 10 mg tablet Take 1 tablet (10 mg total) by mouth every 8 (eight) hours as needed for Pain. 14 tablet 5/4/2023 -- Jesus Manuel Travis MD    gabapentin (NEURONTIN) 600 MG tablet Take 1 tablet (600 mg total) by mouth 3 (three) times daily. 90 tablet 5/4/2023 -- Jesus Manuel Travis MD          Follow-up Information       Follow up With Specialties Details Why Contact Info    WILIAM Doyle Nurse Practitioner   [address] [id number] [phone]      [facility] - Emergency Dept Emergency Medicine  As needed, If symptoms worsen Count includes the [facility] [facility] [address] [phone]             Jesus Manuel Travis MD  05/04/23 0457       Jesus Manuel Travis MD  05/04/23 0160

## 2023-05-18 ENCOUNTER — PATIENT MESSAGE (OUTPATIENT)
Dept: ADMINISTRATIVE | Facility: HOSPITAL | Age: 48
End: 2023-05-18
Payer: MEDICAID

## 2023-06-14 ENCOUNTER — HOSPITAL ENCOUNTER (EMERGENCY)
Facility: HOSPITAL | Age: 48
Discharge: HOME | End: 2023-06-14
Attending: INTERNAL MEDICINE
Payer: MEDICAID

## 2023-06-14 VITALS
OXYGEN SATURATION: 98 % | RESPIRATION RATE: 16 BRPM | HEART RATE: 68 BPM | TEMPERATURE: 97 F | WEIGHT: 238.13 LBS | BODY MASS INDEX: 38.27 KG/M2 | SYSTOLIC BLOOD PRESSURE: 147 MMHG | HEIGHT: 66 IN | DIASTOLIC BLOOD PRESSURE: 93 MMHG

## 2023-06-14 DIAGNOSIS — M54.50 CHRONIC LOW BACK PAIN, UNSPECIFIED BACK PAIN LATERALITY, UNSPECIFIED WHETHER SCIATICA PRESENT: ICD-10-CM

## 2023-06-14 DIAGNOSIS — G89.29 CHRONIC LOW BACK PAIN, UNSPECIFIED BACK PAIN LATERALITY, UNSPECIFIED WHETHER SCIATICA PRESENT: ICD-10-CM

## 2023-06-14 DIAGNOSIS — N64.52 BLOODY DISCHARGE FROM LEFT NIPPLE: Primary | ICD-10-CM

## 2023-06-14 LAB
ALBUMIN SERPL-MCNC: 3.6 G/DL (ref 3.5–5)
ALBUMIN/GLOB SERPL: 1.1 RATIO (ref 1.1–2)
ALP SERPL-CCNC: 163 UNIT/L (ref 40–150)
ALT SERPL-CCNC: 13 UNIT/L (ref 0–55)
AST SERPL-CCNC: 10 UNIT/L (ref 5–34)
BASOPHILS # BLD AUTO: 0.04 X10(3)/MCL
BASOPHILS NFR BLD AUTO: 0.5 %
BILIRUBIN DIRECT+TOT PNL SERPL-MCNC: 0.2 MG/DL
BUN SERPL-MCNC: 13.3 MG/DL (ref 7–18.7)
CALCIUM SERPL-MCNC: 9.1 MG/DL (ref 8.4–10.2)
CHLORIDE SERPL-SCNC: 109 MMOL/L (ref 98–107)
CO2 SERPL-SCNC: 22 MMOL/L (ref 22–29)
CREAT SERPL-MCNC: 0.87 MG/DL (ref 0.55–1.02)
EOSINOPHIL # BLD AUTO: 0.18 X10(3)/MCL (ref 0–0.9)
EOSINOPHIL NFR BLD AUTO: 2.2 %
ERYTHROCYTE [DISTWIDTH] IN BLOOD BY AUTOMATED COUNT: 13.7 % (ref 11.5–17)
GFR SERPLBLD CREATININE-BSD FMLA CKD-EPI: >60 MLS/MIN/1.73/M2
GLOBULIN SER-MCNC: 3.3 GM/DL (ref 2.4–3.5)
GLUCOSE SERPL-MCNC: 149 MG/DL (ref 74–100)
HCT VFR BLD AUTO: 42.8 % (ref 37–47)
HGB BLD-MCNC: 14.1 G/DL (ref 12–16)
IMM GRANULOCYTES # BLD AUTO: 0.02 X10(3)/MCL (ref 0–0.04)
IMM GRANULOCYTES NFR BLD AUTO: 0.2 %
LYMPHOCYTES # BLD AUTO: 2.69 X10(3)/MCL (ref 0.6–4.6)
LYMPHOCYTES NFR BLD AUTO: 32.2 %
MCH RBC QN AUTO: 32.1 PG (ref 27–31)
MCHC RBC AUTO-ENTMCNC: 32.9 G/DL (ref 33–36)
MCV RBC AUTO: 97.5 FL (ref 80–94)
MONOCYTES # BLD AUTO: 0.5 X10(3)/MCL (ref 0.1–1.3)
MONOCYTES NFR BLD AUTO: 6 %
NEUTROPHILS # BLD AUTO: 4.93 X10(3)/MCL (ref 2.1–9.2)
NEUTROPHILS NFR BLD AUTO: 58.9 %
NRBC BLD AUTO-RTO: 0 %
PLATELET # BLD AUTO: 217 X10(3)/MCL (ref 130–400)
PMV BLD AUTO: 9.9 FL (ref 7.4–10.4)
POTASSIUM SERPL-SCNC: 5 MMOL/L (ref 3.5–5.1)
PROT SERPL-MCNC: 6.9 GM/DL (ref 6.4–8.3)
RBC # BLD AUTO: 4.39 X10(6)/MCL (ref 4.2–5.4)
SODIUM SERPL-SCNC: 138 MMOL/L (ref 136–145)
WBC # SPEC AUTO: 8.36 X10(3)/MCL (ref 4.5–11.5)

## 2023-06-14 PROCEDURE — 80053 COMPREHEN METABOLIC PANEL: CPT | Performed by: PHYSICIAN ASSISTANT

## 2023-06-14 PROCEDURE — 99283 EMERGENCY DEPT VISIT LOW MDM: CPT

## 2023-06-14 PROCEDURE — 85025 COMPLETE CBC W/AUTO DIFF WBC: CPT | Performed by: PHYSICIAN ASSISTANT

## 2023-06-14 RX ORDER — GABAPENTIN 300 MG/1
300 CAPSULE ORAL 3 TIMES DAILY
Qty: 90 CAPSULE | Refills: 0 | Status: SHIPPED | OUTPATIENT
Start: 2023-06-14 | End: 2023-09-23 | Stop reason: SDUPTHER

## 2023-06-14 NOTE — DISCHARGE INSTRUCTIONS
I have referred you to breast clinic and ordered a breast ultrasound. If there is an abnormality on the ultrasound, they will also order a mammogram.

## 2023-06-14 NOTE — ED PROVIDER NOTES
Encounter Date: 6/14/2023       History     Chief Complaint   Patient presents with    Breast Discharge     PT REPORTS GREEN/CLEAR DC TO RT BREAST FOR YRS.  NOTED BLOODY DC TODAY.  DENIES SWELLING, REDNESS, LUMPS OR PAIN.  ALSO CHRONIC LOWER BACK PAIN REQUESTING REFILL ON GABAPENTIN.         Serge Krishnamurthy is a 47 y.o. female who presents to the ED with complaints of bloody discharge from her left breast that started this morning. She reports she has had clear/green discharge from her bialteral nipples x years but denies ever having blood. Denies feeling a lump,fever, chills. She states she had a mammogram 3 years ago and it was benign. Also requesting a refill of her Gabapentin.       The history is provided by the patient. No  was used.   Review of patient's allergies indicates:   Allergen Reactions    Lisinopril     Opioids - morphine analogues Other (See Comments)     Increase heart rate    Penicillins      Other reaction(s): short of breath, rash    Penicillins Hives    Opioids - morphine analogues Palpitations     Past Medical History:   Diagnosis Date    Coronary artery disease     Hypertension     NSTEMI (non-ST elevated myocardial infarction)      Past Surgical History:   Procedure Laterality Date    ANGIOGRAPHY      CHOLECYSTECTOMY      INTRAVASCULAR ULTRASOUND, CORONARY N/A 1/27/2023    Procedure: Ultrasound-coronary;  Surgeon: Evan Trejo MD;  Location: [facility] CATH LAB;  Service: Cardiology;  Laterality: N/A;    LEFT HEART CATHETERIZATION N/A 1/27/2023    Procedure: Left heart cath;  Surgeon: Evan Trejo MD;  Location: [facility] CATH LAB;  Service: Cardiology;  Laterality: N/A;    STENT, DRUG ELUTING, SINGLE VESSEL, CORONARY  1/27/2023    Procedure: Stent, Drug Eluting, Single Vessel, Coronary;  Surgeon: Evan Trejo MD;  Location: [facility] CATH LAB;  Service: Cardiology;;     Family History   Problem Relation Age of Onset    Diabetes Mother     Heart disease Mother     Diabetes  Father     Hypertension Father     Heart disease Father     Cancer Father     Febrile seizures Father      Social History     Tobacco Use    Smoking status: Former     Packs/day: 1.00     Types: Cigarettes     Quit date: 2023     Years since quittin.1    Smokeless tobacco: Never   Substance Use Topics    Alcohol use: Not Currently    Drug use: Never     Review of Systems   Constitutional:  Negative for chills, fatigue and fever.   HENT:  Negative for congestion, ear pain, sinus pain and sore throat.    Eyes:  Negative for pain.   Respiratory:  Negative for cough, chest tightness and shortness of breath.    Cardiovascular:  Negative for chest pain.   Gastrointestinal:  Negative for abdominal pain, constipation, diarrhea, nausea and vomiting.   Genitourinary:  Negative for dysuria, flank pain, frequency, hematuria, pelvic pain, urgency and vaginal pain.        Nipple discharge   Musculoskeletal:  Positive for back pain. Negative for joint swelling.   Skin:  Negative for color change and rash.   Neurological:  Negative for dizziness and weakness.   Psychiatric/Behavioral:  Negative for behavioral problems and confusion.      Physical Exam     Initial Vitals [23 1447]   BP Pulse Resp Temp SpO2   (!) 155/102 70 16 96.8 °F (36 °C) 97 %      MAP       --         Physical Exam    Nursing note and vitals reviewed.  Constitutional: She appears well-developed and well-nourished.   HENT:   Head: Normocephalic and atraumatic.   Nose: Nose normal.   Eyes: EOM are normal. Pupils are equal, round, and reactive to light.   Neck: Neck supple. No thyromegaly present. No JVD present.   Normal range of motion.  Cardiovascular:  Normal rate, regular rhythm, normal heart sounds and intact distal pulses.           No murmur heard.  Pulmonary/Chest: Breath sounds normal. No respiratory distress. She has no wheezes. She has no rhonchi. She has no rales. She exhibits no tenderness. Left breast exhibits nipple discharge  (bloody). Left breast exhibits no mass, no skin change and no tenderness. No breast swelling.   Abdominal: Abdomen is soft. Bowel sounds are normal. She exhibits no distension. There is no abdominal tenderness. There is no rebound and no guarding.   Genitourinary: No breast swelling.   Musculoskeletal:         General: No tenderness or edema. Normal range of motion.      Cervical back: Normal range of motion and neck supple.     Lymphadenopathy:     She has no cervical adenopathy.   Neurological: She is alert and oriented to person, place, and time.   Skin: Skin is warm and dry. Capillary refill takes less than 2 seconds.   Psychiatric: She has a normal mood and affect. Thought content normal.       ED Course   Procedures  Labs Reviewed   CBC WITH DIFFERENTIAL - Abnormal; Notable for the following components:       Result Value    MCV 97.5 (*)     MCH 32.1 (*)     MCHC 32.9 (*)     All other components within normal limits   CBC W/ AUTO DIFFERENTIAL    Narrative:     The following orders were created for panel order CBC auto differential.  Procedure                               Abnormality         Status                     ---------                               -----------         ------                     CBC with Differential[[id number]]        Abnormal            Final result                 Please view results for these tests on the individual orders.   COMPREHENSIVE METABOLIC PANEL   EXTRA TUBES    Narrative:     The following orders were created for panel order EXTRA TUBES.  Procedure                               Abnormality         Status                     ---------                               -----------         ------                     Light Blue Top Hold[[id number]]                              In process                 Gold Top Hold[[id number]]                                    In process                   Please view results for these tests on the individual orders.   LIGHT BLUE TOP HOLD   GOLD  TOP HOLD          Imaging Results    None          Medications - No data to display              ED Course as of 06/14/23 1549   Wed Jun 14, 2023   1544 Per Breast imaging guidelines, I will order patient an outpatient breast US and refer her to breast clinic. Discussed this with patient and she verbalized understanding. Strict return precautions given. Stable for discharge.  [VH]      ED Course User Index  [VH] Lucie Carrero PA-C                 Clinical Impression:   Final diagnoses:  [N64.52] Bloody discharge from left nipple (Primary)  [M54.50, G89.29] Chronic low back pain, unspecified back pain laterality, unspecified whether sciatica present        ED Disposition Condition    Discharge Stable          ED Prescriptions       Medication Sig Dispense Start Date End Date Auth. Provider    gabapentin (NEURONTIN) 300 MG capsule Take 1 capsule (300 mg total) by mouth 3 (three) times daily. 90 capsule 6/14/2023 6/13/2024 Lucie Carrero PA-C          Follow-up Information       Follow up With Specialties Details Why Contact Info    WILIAM Doyle Nurse Practitioner   [address] [facility] [id number] [phone]      [facility] Emergency Medicine In 3 days As needed, If symptoms worsen [address] [facility] [address] [phone]             Lucie Carrero PA-C  06/14/23 7986

## 2023-06-21 ENCOUNTER — HOSPITAL ENCOUNTER (EMERGENCY)
Facility: HOSPITAL | Age: 48
Discharge: HOME | End: 2023-06-21
Attending: EMERGENCY MEDICINE
Payer: MEDICAID

## 2023-06-21 VITALS
WEIGHT: 248 LBS | TEMPERATURE: 98 F | DIASTOLIC BLOOD PRESSURE: 87 MMHG | RESPIRATION RATE: 16 BRPM | SYSTOLIC BLOOD PRESSURE: 137 MMHG | BODY MASS INDEX: 39.86 KG/M2 | OXYGEN SATURATION: 95 % | HEART RATE: 68 BPM | HEIGHT: 66 IN

## 2023-06-21 DIAGNOSIS — R06.02 SOB (SHORTNESS OF BREATH): ICD-10-CM

## 2023-06-21 DIAGNOSIS — M79.89 RIGHT LEG SWELLING: ICD-10-CM

## 2023-06-21 LAB
ALBUMIN SERPL-MCNC: 3.6 G/DL (ref 3.5–5)
ALBUMIN/GLOB SERPL: 1.2 RATIO (ref 1.1–2)
ALP SERPL-CCNC: 176 UNIT/L (ref 40–150)
ALT SERPL-CCNC: 16 UNIT/L (ref 0–55)
APTT PPP: 143.6 SECONDS (ref 23.2–33.7)
APTT PPP: 29.2 SECONDS (ref 23.2–33.7)
AST SERPL-CCNC: 12 UNIT/L (ref 5–34)
BASOPHILS # BLD AUTO: 0.05 X10(3)/MCL
BASOPHILS NFR BLD AUTO: 0.6 %
BILIRUBIN DIRECT+TOT PNL SERPL-MCNC: 0.2 MG/DL
BNP BLD-MCNC: 47.2 PG/ML
BUN SERPL-MCNC: 10.7 MG/DL (ref 7–18.7)
CALCIUM SERPL-MCNC: 8.8 MG/DL (ref 8.4–10.2)
CHLORIDE SERPL-SCNC: 109 MMOL/L (ref 98–107)
CO2 SERPL-SCNC: 22 MMOL/L (ref 22–29)
CREAT SERPL-MCNC: 0.86 MG/DL (ref 0.55–1.02)
EOSINOPHIL # BLD AUTO: 0.2 X10(3)/MCL (ref 0–0.9)
EOSINOPHIL NFR BLD AUTO: 2.2 %
ERYTHROCYTE [DISTWIDTH] IN BLOOD BY AUTOMATED COUNT: 14.2 % (ref 11.5–17)
GFR SERPLBLD CREATININE-BSD FMLA CKD-EPI: >60 MLS/MIN/1.73/M2
GLOBULIN SER-MCNC: 3 GM/DL (ref 2.4–3.5)
GLUCOSE SERPL-MCNC: 142 MG/DL (ref 74–100)
HCT VFR BLD AUTO: 42.9 % (ref 37–47)
HGB BLD-MCNC: 13.7 G/DL (ref 12–16)
IMM GRANULOCYTES # BLD AUTO: 0.05 X10(3)/MCL (ref 0–0.04)
IMM GRANULOCYTES NFR BLD AUTO: 0.6 %
INR BLD: 0.95 (ref 0–1.3)
INR BLD: 14.75 (ref 0–1.3)
LYMPHOCYTES # BLD AUTO: 2.53 X10(3)/MCL (ref 0.6–4.6)
LYMPHOCYTES NFR BLD AUTO: 27.9 %
MAGNESIUM SERPL-MCNC: 1.7 MG/DL (ref 1.6–2.6)
MCH RBC QN AUTO: 31.9 PG (ref 27–31)
MCHC RBC AUTO-ENTMCNC: 31.9 G/DL (ref 33–36)
MCV RBC AUTO: 100 FL (ref 80–94)
MONOCYTES # BLD AUTO: 0.58 X10(3)/MCL (ref 0.1–1.3)
MONOCYTES NFR BLD AUTO: 6.4 %
NEUTROPHILS # BLD AUTO: 5.66 X10(3)/MCL (ref 2.1–9.2)
NEUTROPHILS NFR BLD AUTO: 62.3 %
NRBC BLD AUTO-RTO: 0 %
PLATELET # BLD AUTO: 238 X10(3)/MCL (ref 130–400)
PMV BLD AUTO: 10.1 FL (ref 7.4–10.4)
POC PTINR: 1.1 (ref 0.9–1.2)
POC PTWBT: 13.7 SEC (ref 9.7–14.3)
POTASSIUM SERPL-SCNC: 4.7 MMOL/L (ref 3.5–5.1)
PROT SERPL-MCNC: 6.6 GM/DL (ref 6.4–8.3)
PROTHROMBIN TIME: 105.5 SECONDS (ref 12.5–14.5)
PROTHROMBIN TIME: 12.6 SECONDS (ref 12.5–14.5)
RBC # BLD AUTO: 4.29 X10(6)/MCL (ref 4.2–5.4)
SAMPLE: NORMAL
SODIUM SERPL-SCNC: 140 MMOL/L (ref 136–145)
TROPONIN I SERPL-MCNC: <0.01 NG/ML (ref 0–0.04)
WBC # SPEC AUTO: 9.07 X10(3)/MCL (ref 4.5–11.5)

## 2023-06-21 PROCEDURE — 85025 COMPLETE CBC W/AUTO DIFF WBC: CPT | Performed by: NURSE PRACTITIONER

## 2023-06-21 PROCEDURE — 99285 EMERGENCY DEPT VISIT HI MDM: CPT | Mod: 25

## 2023-06-21 PROCEDURE — 83880 ASSAY OF NATRIURETIC PEPTIDE: CPT | Performed by: NURSE PRACTITIONER

## 2023-06-21 PROCEDURE — 85610 PROTHROMBIN TIME: CPT | Performed by: NURSE PRACTITIONER

## 2023-06-21 PROCEDURE — 85730 THROMBOPLASTIN TIME PARTIAL: CPT | Performed by: NURSE PRACTITIONER

## 2023-06-21 PROCEDURE — 80053 COMPREHEN METABOLIC PANEL: CPT | Performed by: NURSE PRACTITIONER

## 2023-06-21 PROCEDURE — 85730 THROMBOPLASTIN TIME PARTIAL: CPT | Performed by: STUDENT IN AN ORGANIZED HEALTH CARE EDUCATION/TRAINING PROGRAM

## 2023-06-21 PROCEDURE — 83735 ASSAY OF MAGNESIUM: CPT | Performed by: NURSE PRACTITIONER

## 2023-06-21 PROCEDURE — 93010 EKG 12-LEAD: ICD-10-PCS | Mod: ,,, | Performed by: INTERNAL MEDICINE

## 2023-06-21 PROCEDURE — 85610 PROTHROMBIN TIME: CPT | Performed by: STUDENT IN AN ORGANIZED HEALTH CARE EDUCATION/TRAINING PROGRAM

## 2023-06-21 PROCEDURE — 93005 ELECTROCARDIOGRAM TRACING: CPT

## 2023-06-21 PROCEDURE — 84484 ASSAY OF TROPONIN QUANT: CPT | Performed by: NURSE PRACTITIONER

## 2023-06-21 PROCEDURE — 93010 ELECTROCARDIOGRAM REPORT: CPT | Mod: ,,, | Performed by: INTERNAL MEDICINE

## 2023-06-21 RX ORDER — FUROSEMIDE 40 MG/1
40 TABLET ORAL DAILY
Qty: 3 TABLET | Refills: 0 | Status: SHIPPED | OUTPATIENT
Start: 2023-06-21 | End: 2023-06-24

## 2023-06-21 NOTE — FIRST PROVIDER EVALUATION
Medical screening examination initiated.  I have conducted a focused provider triage encounter, findings are as follows:    Brief history of present illness:  46 y/o female who presents with sob upon exertion for 1 month. Quit smoking, 3 months. Hx MI in January. Also reports right leg swelling. Saw pcp who sent her here for concern for heart failure.     There were no vitals filed for this visit.    Pertinent physical exam:  alert, nonlabored, right leg (lower) swollen in comparison to left.     Brief workup plan:  ekg, labs, imaging    Preliminary workup initiated; this workup will be continued and followed by the physician or advanced practice provider that is assigned to the patient when roomed.

## 2023-06-22 NOTE — ED PROVIDER NOTES
Encounter Date: 2023       History     Chief Complaint   Patient presents with    Leg Swelling    Shortness of Breath     C/o Bilat LE swelling and sob worsening over past month, states seen by pcp today and wass told to come to the ED for poss CHF. States worsening exertional dyspnea as well.     See MDM    The history is provided by the patient. No  was used.   Review of patient's allergies indicates:   Allergen Reactions    Lisinopril     Opioids - morphine analogues Other (See Comments)     Increase heart rate    Penicillins      Other reaction(s): short of breath, rash    Penicillins Hives    Opioids - morphine analogues Palpitations     Past Medical History:   Diagnosis Date    Coronary artery disease     Hypertension     NSTEMI (non-ST elevated myocardial infarction)      Past Surgical History:   Procedure Laterality Date    ANGIOGRAPHY      CHOLECYSTECTOMY      INTRAVASCULAR ULTRASOUND, CORONARY N/A 2023    Procedure: Ultrasound-coronary;  Surgeon: Evan Trejo MD;  Location: [facility] CATH LAB;  Service: Cardiology;  Laterality: N/A;    LEFT HEART CATHETERIZATION N/A 2023    Procedure: Left heart cath;  Surgeon: Evan Trejo MD;  Location: [facility] CATH LAB;  Service: Cardiology;  Laterality: N/A;    STENT, DRUG ELUTING, SINGLE VESSEL, CORONARY  2023    Procedure: Stent, Drug Eluting, Single Vessel, Coronary;  Surgeon: Evan Trejo MD;  Location: [facility] CATH LAB;  Service: Cardiology;;     Family History   Problem Relation Age of Onset    Diabetes Mother     Heart disease Mother     Diabetes Father     Hypertension Father     Heart disease Father     Cancer Father     Febrile seizures Father      Social History     Tobacco Use    Smoking status: Former     Packs/day: 1.00     Types: Cigarettes     Quit date: 2023     Years since quittin.1    Smokeless tobacco: Never   Substance Use Topics    Alcohol use: Not Currently    Drug use: Never     Review of Systems    Constitutional:  Negative for fever.   Respiratory:  Positive for shortness of breath. Negative for cough.    Cardiovascular:  Positive for leg swelling. Negative for chest pain.   Gastrointestinal:  Negative for abdominal pain.   Genitourinary:  Negative for difficulty urinating and dysuria.   Musculoskeletal:  Negative for gait problem.   Skin:  Negative for color change.   Neurological:  Negative for dizziness, speech difficulty and headaches.   Psychiatric/Behavioral:  Negative for hallucinations and suicidal ideas.    All other systems reviewed and are negative.    Physical Exam     Initial Vitals [06/21/23 1750]   BP Pulse Resp Temp SpO2   128/86 67 20 98.4 °F (36.9 °C) 97 %      MAP       --         Physical Exam    Nursing note and vitals reviewed.  Constitutional: She appears well-developed and well-nourished.   HENT:   Head: Normocephalic.   Eyes: EOM are normal.   Neck:   Normal range of motion.  Cardiovascular:  Normal rate, regular rhythm, normal heart sounds and intact distal pulses.           Bilateral leg edema   Pulmonary/Chest: Breath sounds normal. No respiratory distress.   Abdominal: Abdomen is soft. Bowel sounds are normal. There is no abdominal tenderness.   Musculoskeletal:         General: Normal range of motion.      Cervical back: Normal range of motion.     Neurological: She is alert and oriented to person, place, and time. She has normal strength.   Skin: Skin is warm and dry.   Psychiatric: She has a normal mood and affect. Her behavior is normal. Judgment and thought content normal.       ED Course   Procedures  Labs Reviewed   CBC WITH DIFFERENTIAL - Abnormal; Notable for the following components:       Result Value    .0 (*)     MCH 31.9 (*)     MCHC 31.9 (*)     IG# 0.05 (*)     All other components within normal limits   COMPREHENSIVE METABOLIC PANEL - Abnormal; Notable for the following components:    Chloride 109 (*)     Glucose Level 142 (*)     Alkaline Phosphatase 176  (*)     All other components within normal limits   PROTIME-INR - Abnormal; Notable for the following components:    .5 (*)     INR 14.75 (*)     All other components within normal limits   APTT - Abnormal; Notable for the following components:    .6 (*)     All other components within normal limits   B-TYPE NATRIURETIC PEPTIDE - Normal   TROPONIN I - Normal   MAGNESIUM - Normal   APTT - Normal   PROTIME-INR - Normal   POCT INR   ISTAT PROCEDURE     EKG Readings: (Independently Interpreted)   Rhythm: Normal Sinus Rhythm. Heart Rate: 63. Ectopy: No Ectopy. Conduction: Normal. ST Segments: Normal ST Segments. T Waves: Normal. Axis: Normal. Clinical Impression: Normal Sinus Rhythm     Imaging Results              X-Ray Chest 1 View (Final result)  Result time 06/21/23 18:33:09      Final result by Kim Molina MD (06/21/23 18:33:09)                   Impression:      No acute cardiopulmonary abnormality.      Electronically signed by: Kim Molina  Date:    06/21/2023  Time:    18:33               Narrative:    EXAMINATION:  XR CHEST 1 VIEW    CLINICAL HISTORY:  sob;    TECHNIQUE:  Single frontal view of the chest was performed.    COMPARISON:  02/28/2023    FINDINGS:  LINES AND TUBES: None    MEDIASTINUM AND GRACE: Cardiac silhouette is enlarged.    LUNGS: No lobar consolidation. No edema.    PLEURA:No pleural effusion. No pneumothorax.    OTHER: No acute osseous abnormality.                                       Medications - No data to display  Medical Decision Making:   Initial Assessment:   Historian:  Patient.  Patient is a 47-year-old female  that presents with sent by PCP to rule out CHF that has been present today. Associated symptoms patient does state she gets exertional shortness of breath at times but does not feel short of breath at present time.  She also has some lower extremity edema. Surrounding information is nothing. Exacerbated by nothing. Relieved by nothing. Patient  treatment prior to arrival nothing. Risk factors include CAD. Other history pertaining to this complaint nothing.   Assessment:  See physical exam.    Differential Diagnosis:   CHF, venous insufficiency, peripheral edema  ED Management:  History was obtained.  Physical was performed.  X-ray shows no acute findings.  Patient's BNP level was normal.  I did place a call to [facility] to discuss the case with the on-call provider. Spoke to Gayatri.  I discussed case with her.  They will arrange for follow-up outpatient appointment.  I will put the patient on a few days of Lasix.  No social determinants that affect healthcare noted.  INR was an erroneous result                        Clinical Impression:   Final diagnoses:  [R06.02] SOB (shortness of breath)  [M79.89] Right leg swelling        ED Disposition Condition    Discharge Stable          ED Prescriptions       Medication Sig Dispense Start Date End Date Auth. Provider    furosemide (LASIX) 40 MG tablet Take 1 tablet (40 mg total) by mouth once daily. for 3 days 3 tablet 6/21/2023 6/24/2023 WILIAM Monzon          Follow-up Information       Follow up With Specialties Details Why Contact Info    Follow-up with Cardiovascular [place]    Office will call with follow-up appointment date and time             WILIAM Monzon  06/21/23 2017       WILIAM Monzon  06/21/23 2018

## 2023-07-19 ENCOUNTER — HOSPITAL ENCOUNTER (OUTPATIENT)
Dept: RADIOLOGY | Facility: HOSPITAL | Age: 48
Discharge: HOME | End: 2023-07-19
Attending: NURSE PRACTITIONER
Payer: MEDICAID

## 2023-07-19 DIAGNOSIS — Z12.31 ENCOUNTER FOR SCREENING MAMMOGRAM FOR MALIGNANT NEOPLASM OF BREAST: ICD-10-CM

## 2023-07-19 DIAGNOSIS — N64.52 BLOODY DISCHARGE FROM LEFT NIPPLE: ICD-10-CM

## 2023-07-19 PROCEDURE — 77066 DX MAMMO INCL CAD BI: CPT | Mod: 26,,, | Performed by: RADIOLOGY

## 2023-07-19 PROCEDURE — 76642 US BREAST LEFT LIMITED: ICD-10-PCS | Mod: 26,LT,, | Performed by: RADIOLOGY

## 2023-07-19 PROCEDURE — 77062 MAMMO DIGITAL DIAGNOSTIC BILAT WITH TOMO: ICD-10-PCS | Mod: 26,,, | Performed by: RADIOLOGY

## 2023-07-19 PROCEDURE — 76642 ULTRASOUND BREAST LIMITED: CPT | Mod: 26,LT,, | Performed by: RADIOLOGY

## 2023-07-19 PROCEDURE — 77066 DX MAMMO INCL CAD BI: CPT | Mod: TC

## 2023-07-19 PROCEDURE — 76642 ULTRASOUND BREAST LIMITED: CPT | Mod: TC,LT

## 2023-07-19 PROCEDURE — 77066 MAMMO DIGITAL DIAGNOSTIC BILAT WITH TOMO: ICD-10-PCS | Mod: 26,,, | Performed by: RADIOLOGY

## 2023-07-19 PROCEDURE — 77062 BREAST TOMOSYNTHESIS BI: CPT | Mod: 26,,, | Performed by: RADIOLOGY

## 2023-07-25 ENCOUNTER — HOSPITAL ENCOUNTER (OUTPATIENT)
Dept: RADIOLOGY | Facility: HOSPITAL | Age: 48
Discharge: HOME | End: 2023-07-25
Attending: NURSE PRACTITIONER
Payer: MEDICAID

## 2023-07-25 DIAGNOSIS — N64.52 BLOODY DISCHARGE FROM LEFT NIPPLE: ICD-10-CM

## 2023-07-25 DIAGNOSIS — N63.20 BREAST MASS, LEFT: ICD-10-CM

## 2023-07-25 DIAGNOSIS — N63.25 MASS OVERLAPPING MULTIPLE QUADRANTS OF LEFT BREAST: Primary | ICD-10-CM

## 2023-07-25 PROCEDURE — 77061 MAMMO DIGITAL DIAGNOSTIC LEFT WITH TOMO: ICD-10-PCS | Mod: 26,LT,, | Performed by: RADIOLOGY

## 2023-07-25 PROCEDURE — 77065 MAMMO DIGITAL DIAGNOSTIC LEFT WITH TOMO: ICD-10-PCS | Mod: 26,LT,, | Performed by: RADIOLOGY

## 2023-07-25 PROCEDURE — 19083 BX BREAST 1ST LESION US IMAG: CPT | Mod: LT,,, | Performed by: RADIOLOGY

## 2023-07-25 PROCEDURE — 19084 US BREAST BIOPSY WITH IMAGING EA ADDITIONAL: ICD-10-PCS | Mod: LT,,, | Performed by: RADIOLOGY

## 2023-07-25 PROCEDURE — 77065 DX MAMMO INCL CAD UNI: CPT | Mod: 26,LT,, | Performed by: RADIOLOGY

## 2023-07-25 PROCEDURE — 87205 SMEAR GRAM STAIN: CPT | Performed by: RADIOLOGY

## 2023-07-25 PROCEDURE — 19083 PR BX BRST, 1ST LESION, US GUIDANCE: ICD-10-PCS | Mod: LT,,, | Performed by: RADIOLOGY

## 2023-07-25 PROCEDURE — 88305 TISSUE EXAM BY PATHOLOGIST: CPT | Mod: TC | Performed by: RADIOLOGY

## 2023-07-25 PROCEDURE — 19083 BX BREAST 1ST LESION US IMAG: CPT | Mod: LT

## 2023-07-25 PROCEDURE — 77061 BREAST TOMOSYNTHESIS UNI: CPT | Mod: 26,LT,, | Performed by: RADIOLOGY

## 2023-07-25 PROCEDURE — 19084 BX BREAST ADD LESION US IMAG: CPT | Mod: LT,,, | Performed by: RADIOLOGY

## 2023-07-25 PROCEDURE — 87075 CULTR BACTERIA EXCEPT BLOOD: CPT | Performed by: RADIOLOGY

## 2023-07-25 PROCEDURE — 77061 BREAST TOMOSYNTHESIS UNI: CPT | Mod: TC,LT

## 2023-07-25 PROCEDURE — 19084 BX BREAST ADD LESION US IMAG: CPT

## 2023-07-25 PROCEDURE — 87070 CULTURE OTHR SPECIMN AEROBIC: CPT | Performed by: RADIOLOGY

## 2023-07-26 LAB — GRAM STN SPEC: NORMAL

## 2023-07-28 LAB — BACTERIA SPEC ANAEROBE CULT: NORMAL

## 2023-07-30 LAB
ESTROGEN SERPL-MCNC: NORMAL PG/ML
INSULIN SERPL-ACNC: NORMAL U[IU]/ML
LAB AP CLINICAL INFORMATION: NORMAL
LAB AP GROSS DESCRIPTION: NORMAL
LAB AP REPORT FOOTNOTES: NORMAL
T3RU NFR SERPL: NORMAL %

## 2023-07-31 ENCOUNTER — TELEPHONE (OUTPATIENT)
Dept: RADIOLOGY | Facility: HOSPITAL | Age: 48
End: 2023-07-31
Payer: MEDICAID

## 2023-07-31 LAB — BACTERIA SPEC CULT: NORMAL

## 2023-07-31 NOTE — TELEPHONE ENCOUNTER
----- Message from Perfecto Bowers MD sent at 7/31/2023  7:18 AM CDT -----  Regarding: Benign  Pathology  Please see below. Microbiology of the retroareolar mass (item 2 below) was also negative.    Brenda, please communicate the results of the biopsy and recommendations to the patient.    Thanks.            Pathology is now available for review, and demonstrates:  Ultrasound-guided core biopsy of:  1)  left lower central retroareolar breast 6 x 2 x 4 mm heterogeneous mass at the 6 o'clock location, 2 cm from the nipple, demarcated by hydromark open coil-shaped biopsy clip in the lower inner retroareolar left breast which may be rolled medially secondary to mammographic positioning.  - Breast tissue with usual ductal hyperplasia, cystic papillary apocrine metaplasia and stromal sclerosis.  - Microcalcifications are identified.    - Negative for malignancy.      2)  left retroareolar breast 1.2 x 0.5 x 1.5 cm complex cystic and solid mass with no post-biopsy marking clip placed due to possible infection/inflammatory etiology.  - Breast tissue with columnar cell hyperplasia and stromal sclerosis.    - Negative for malignancy.      Please see pathology report for full details. These pathology results are concordant with imaging findings.     Recommend clinical follow-up of the patient's left nipple discharge.  Repeat left diagnostic tomosynthesis mammogram and limited left breast ultrasound can be performed should the patient's left nipple bloody discharge recur/increase in frequency.  Otherwise, recommend screening mammography (with Digital Breast Tomosynthesis) due in 07/2024, according to [employer] guidelines.    Pathology results and recommendations will be communicated by Brenda Hahn, [facility] Breast Imaging nurse navigator, to the patient/provider and documented in the electronic medical record.

## 2023-08-01 NOTE — NURSING
Pathology results and recommendations discussed with patient via telephone. She verbalized understanding. She will follow-up with referring/PCP regarding results, as she still has concerns regarding bloody nipple discharge. Encouraged patient to call with any questions/concerns and/or if referring orders additional imaging.

## 2023-09-11 DIAGNOSIS — Z12.11 COLON CANCER SCREENING: Primary | ICD-10-CM

## 2023-09-22 ENCOUNTER — HOSPITAL ENCOUNTER (EMERGENCY)
Facility: HOSPITAL | Age: 48
Discharge: HOME | End: 2023-09-23
Attending: EMERGENCY MEDICINE
Payer: MEDICAID

## 2023-09-22 DIAGNOSIS — Z76.0 MEDICATION REFILL: ICD-10-CM

## 2023-09-22 DIAGNOSIS — E87.5 HYPERKALEMIA: ICD-10-CM

## 2023-09-22 DIAGNOSIS — I10 PRIMARY HYPERTENSION: ICD-10-CM

## 2023-09-22 PROCEDURE — 99284 EMERGENCY DEPT VISIT MOD MDM: CPT

## 2023-09-23 VITALS
OXYGEN SATURATION: 95 % | BODY MASS INDEX: 38.57 KG/M2 | WEIGHT: 240 LBS | TEMPERATURE: 98 F | RESPIRATION RATE: 16 BRPM | HEIGHT: 66 IN | DIASTOLIC BLOOD PRESSURE: 63 MMHG | HEART RATE: 72 BPM | SYSTOLIC BLOOD PRESSURE: 121 MMHG

## 2023-09-23 LAB
ALBUMIN SERPL-MCNC: 3.5 G/DL (ref 3.5–5)
ALBUMIN/GLOB SERPL: 1.1 RATIO (ref 1.1–2)
ALP SERPL-CCNC: 130 UNIT/L (ref 40–150)
ALT SERPL-CCNC: 17 UNIT/L (ref 0–55)
AST SERPL-CCNC: 15 UNIT/L (ref 5–34)
BILIRUB SERPL-MCNC: 0.2 MG/DL
BUN SERPL-MCNC: 14.6 MG/DL (ref 7–18.7)
CALCIUM SERPL-MCNC: 8.6 MG/DL (ref 8.4–10.2)
CHLORIDE SERPL-SCNC: 112 MMOL/L (ref 98–107)
CO2 SERPL-SCNC: 20 MMOL/L (ref 22–29)
CREAT SERPL-MCNC: 0.77 MG/DL (ref 0.55–1.02)
GFR SERPLBLD CREATININE-BSD FMLA CKD-EPI: >60 MLS/MIN/1.73/M2
GLOBULIN SER-MCNC: 3.1 GM/DL (ref 2.4–3.5)
GLUCOSE SERPL-MCNC: 104 MG/DL (ref 74–100)
POTASSIUM SERPL-SCNC: 4.8 MMOL/L (ref 3.5–5.1)
PROT SERPL-MCNC: 6.6 GM/DL (ref 6.4–8.3)
SODIUM SERPL-SCNC: 138 MMOL/L (ref 136–145)

## 2023-09-23 PROCEDURE — 93005 ELECTROCARDIOGRAM TRACING: CPT

## 2023-09-23 PROCEDURE — 93010 EKG 12-LEAD: ICD-10-PCS | Mod: ,,, | Performed by: INTERNAL MEDICINE

## 2023-09-23 PROCEDURE — 80053 COMPREHEN METABOLIC PANEL: CPT | Performed by: STUDENT IN AN ORGANIZED HEALTH CARE EDUCATION/TRAINING PROGRAM

## 2023-09-23 PROCEDURE — 93010 ELECTROCARDIOGRAM REPORT: CPT | Mod: ,,, | Performed by: INTERNAL MEDICINE

## 2023-09-23 RX ORDER — AMLODIPINE BESYLATE 10 MG/1
10 TABLET ORAL NIGHTLY
Qty: 30 TABLET | Refills: 0 | Status: SHIPPED | OUTPATIENT
Start: 2023-09-23 | End: 2023-10-23

## 2023-09-23 RX ORDER — GABAPENTIN 300 MG/1
300 CAPSULE ORAL 3 TIMES DAILY
Qty: 90 CAPSULE | Refills: 0 | Status: SHIPPED | OUTPATIENT
Start: 2023-09-23 | End: 2024-01-02

## 2023-09-23 NOTE — ED PROVIDER NOTES
Encounter Date: 9/22/2023       History     Chief Complaint   Patient presents with    Abnormal Lab     Pt states Dr. Trejo's office called vandana to notify her that she had and elevated potassium of 5.3 on her blood work she did yesterday and told her to come ER. Pt denies any complaints at this time.      [age] year old female history of CAD with stent in February of 2023, hypertension, presents emergency department after having routine blood work performed 1-2 days ago with her cardiologist.  Cardiologist's office called her and told her potassium was elevated at 5.3 and come to the emergency department get evaluated.  She denies any chest pain shortness of breath dizziness lightheadedness nausea or any symptoms.  States she is out of her Norvasc 10 mg daily and gabapentin 300 mg 3 times a day has appointment her PCP next month and ask if she can have a refill of those medications        Review of patient's allergies indicates:   Allergen Reactions    Lisinopril     Opioids - morphine analogues Other (See Comments)     Increase heart rate    Penicillins      Other reaction(s): short of breath, rash    Penicillins Hives    Toradol [ketorolac]      asprin    Opioids - morphine analogues Palpitations     Past Medical History:   Diagnosis Date    Coronary artery disease     Hypertension     NSTEMI (non-ST elevated myocardial infarction)      Past Surgical History:   Procedure Laterality Date    ANGIOGRAPHY      CHOLECYSTECTOMY      INTRAVASCULAR ULTRASOUND, CORONARY N/A 1/27/2023    Procedure: Ultrasound-coronary;  Surgeon: Evan Trejo MD;  Location: [facility] CATH LAB;  Service: Cardiology;  Laterality: N/A;    LEFT HEART CATHETERIZATION N/A 1/27/2023    Procedure: Left heart cath;  Surgeon: Evan Trejo MD;  Location: [facility] CATH LAB;  Service: Cardiology;  Laterality: N/A;    STENT, DRUG ELUTING, SINGLE VESSEL, CORONARY  1/27/2023    Procedure: Stent, Drug Eluting, Single Vessel, Coronary;  Surgeon: Evan Trejo  MD;  Location: [facility] CATH LAB;  Service: Cardiology;;     Family History   Problem Relation Age of Onset    Diabetes Mother     Heart disease Mother     Diabetes Father     Hypertension Father     Heart disease Father     Cancer Father     Febrile seizures Father      Social History     Tobacco Use    Smoking status: Former     Current packs/day: 0.00     Types: Cigarettes     Quit date: 2023     Years since quittin.4    Smokeless tobacco: Never   Substance Use Topics    Alcohol use: Not Currently    Drug use: Never     Review of Systems   Constitutional:  Negative for chills and fever.   HENT:  Negative for sinus pain.    Respiratory:  Negative for cough, chest tightness and shortness of breath.    Cardiovascular:  Negative for chest pain.   Gastrointestinal:  Negative for abdominal pain, diarrhea, nausea and vomiting.   Genitourinary:  Negative for dysuria and hematuria.   Skin:  Negative for rash.   Neurological:  Negative for syncope and weakness.   All other systems reviewed and are negative.      Physical Exam     Initial Vitals [23]   BP Pulse Resp Temp SpO2   137/83 78 18 98.3 °F (36.8 °C) 97 %      MAP       --         Physical Exam    Nursing note and vitals reviewed.  Constitutional: She appears well-developed and well-nourished. No distress.   HENT:   Head: Normocephalic and atraumatic.   Eyes: Conjunctivae are normal.   Cardiovascular:  Normal rate and intact distal pulses.           Pulmonary/Chest: No respiratory distress. She has no rhonchi.   Abdominal: Abdomen is soft. Bowel sounds are normal. There is no abdominal tenderness. There is no rebound and no guarding.   Musculoskeletal:         General: No edema.     Neurological: She is alert. She has normal strength.   Skin: Skin is warm and dry.   Psychiatric: She has a normal mood and affect.         ED Course   Procedures  Labs Reviewed   COMPREHENSIVE METABOLIC PANEL - Abnormal; Notable for the following components:       Result  Value    Chloride 112 (*)     Carbon Dioxide 20 (*)     Glucose Level 104 (*)     All other components within normal limits          Imaging Results    None          Medications - No data to display  Medical Decision Making  Patient is asymptomatic.  EKG is unchanged from her previous by my interpretation.  Repeat chemistry here shows potassium is unremarkable.  Reassurance provided follow up with PCP a refill the medications that she requested    Problems Addressed:  Hyperkalemia: acute illness or injury that poses a threat to life or bodily functions  Labor abnormality: acute illness or injury  Medication refill: chronic illness or injury    Risk  Prescription drug management.               ED Course as of 09/23/23 0056   Sat Sep 23, 2023   0048 EKG at 0042.  66 per sinus rhythm T-waves are inverted in V1 through V6 aVL biphasic in lead 1.  There is no ST segment elevations or depressions.  This EKG is unchanged from 06/21/2023 [LF]      ED Course User Index  [LF] Damián Shafer MD                    Clinical Impression:   Final diagnoses:  [E87.5] Hyperkalemia - Condition not found  [O62.9] Labor abnormality (Primary)  [Z76.0] Medication refill        ED Disposition Condition    Discharge Stable          ED Prescriptions       Medication Sig Dispense Start Date End Date Auth. Provider    amLODIPine (NORVASC) 10 MG tablet Take 1 tablet (10 mg total) by mouth every evening. For High Blood Pressure 30 tablet 9/23/2023 10/23/2023 Damián Shafer MD    gabapentin (NEURONTIN) 300 MG capsule Take 1 capsule (300 mg total) by mouth 3 (three) times daily. 90 capsule 9/23/2023 10/23/2023 Damián Shafer MD          Follow-up Information       Follow up With Specialties Details Why Contact Info    Renetta Avitia FNP Nurse Practitioner Schedule an appointment as soon as possible for a visit   [address] [facility] [address] [phone]      Evan Trejo MD Cardiology   [address] [place] [address]  B  [facility] [address]  [phone]               Damián Shafer MD  09/23/23 0056

## 2023-09-23 NOTE — DISCHARGE INSTRUCTIONS
Or previous lab abnormality of hyperkalemia was not found.  Continue your current medications.  I have refilled your Norvasc and gabapentin as you have requested

## 2023-10-16 ENCOUNTER — PATIENT MESSAGE (OUTPATIENT)
Dept: ADMINISTRATIVE | Facility: HOSPITAL | Age: 48
End: 2023-10-16
Payer: MEDICAID

## 2024-01-02 ENCOUNTER — HOSPITAL ENCOUNTER (EMERGENCY)
Facility: HOSPITAL | Age: 49
Discharge: HOME | End: 2024-01-02
Attending: INTERNAL MEDICINE
Payer: MEDICAID

## 2024-01-02 VITALS
BODY MASS INDEX: 32.14 KG/M2 | TEMPERATURE: 97 F | DIASTOLIC BLOOD PRESSURE: 86 MMHG | SYSTOLIC BLOOD PRESSURE: 147 MMHG | WEIGHT: 200 LBS | OXYGEN SATURATION: 98 % | HEART RATE: 81 BPM | RESPIRATION RATE: 20 BRPM | HEIGHT: 66 IN

## 2024-01-02 DIAGNOSIS — Z76.0 MEDICATION REFILL: Primary | ICD-10-CM

## 2024-01-02 PROCEDURE — 99281 EMR DPT VST MAYX REQ PHY/QHP: CPT

## 2024-01-02 RX ORDER — GABAPENTIN 300 MG/1
300 CAPSULE ORAL 3 TIMES DAILY
Qty: 90 CAPSULE | Refills: 0 | Status: SHIPPED | OUTPATIENT
Start: 2024-01-02 | End: 2024-02-01

## 2024-01-02 RX ORDER — METFORMIN HYDROCHLORIDE 500 MG/1
500 TABLET ORAL 2 TIMES DAILY
COMMUNITY
Start: 2023-08-18

## 2024-01-02 RX ORDER — METFORMIN HYDROCHLORIDE 500 MG/1
500 TABLET ORAL 2 TIMES DAILY WITH MEALS
Qty: 60 TABLET | Refills: 0 | Status: SHIPPED | OUTPATIENT
Start: 2024-01-02 | End: 2024-02-01

## 2024-01-03 NOTE — ED PROVIDER NOTES
Encounter Date: 1/2/2024       History     Chief Complaint   Patient presents with    Medication Refill     Reports of needing a refill on her metformin 500mg BID and gabapentin 300mg TID. Patient is in between doctors at this time.     Patient is requesting a refill of her daily medications. States that she is out of her Metformin 500 mg BID and her Gabapentin 300 mg TID. States that she has been out of her daily medications x 2 days. States that she is in the middle of switching to a new PCP and is waiting for her appointment. Denies any other complain or any other symptoms.     The history is provided by the patient.     Review of patient's allergies indicates:   Allergen Reactions    Lisinopril     Opioids - morphine analogues Other (See Comments)     Increase heart rate    Penicillins      Other reaction(s): short of breath, rash    Penicillins Hives    Toradol [ketorolac]      asprin    Opioids - morphine analogues Palpitations     Past Medical History:   Diagnosis Date    Coronary artery disease     Hypertension     NSTEMI (non-ST elevated myocardial infarction)      Past Surgical History:   Procedure Laterality Date    ANGIOGRAPHY      CHOLECYSTECTOMY      INTRAVASCULAR ULTRASOUND, CORONARY N/A 1/27/2023    Procedure: Ultrasound-coronary;  Surgeon: Evan Trejo MD;  Location: [facility] CATH LAB;  Service: Cardiology;  Laterality: N/A;    LEFT HEART CATHETERIZATION N/A 1/27/2023    Procedure: Left heart cath;  Surgeon: Evan Trejo MD;  Location: [facility] CATH LAB;  Service: Cardiology;  Laterality: N/A;    STENT, DRUG ELUTING, SINGLE VESSEL, CORONARY  1/27/2023    Procedure: Stent, Drug Eluting, Single Vessel, Coronary;  Surgeon: Evan Trejo MD;  Location: [facility] CATH LAB;  Service: Cardiology;;     Family History   Problem Relation Age of Onset    Diabetes Mother     Heart disease Mother     Diabetes Father     Hypertension Father     Heart disease Father     Cancer Father     Febrile seizures Father       Social History     Tobacco Use    Smoking status: Former     Current packs/day: 0.00     Types: Cigarettes     Quit date: 2023     Years since quittin.7    Smokeless tobacco: Never   Substance Use Topics    Alcohol use: Not Currently    Drug use: Never     Review of Systems   Constitutional: Negative.    HENT: Negative.     Eyes: Negative.    Respiratory: Negative.     Cardiovascular: Negative.    Gastrointestinal: Negative.    Endocrine: Negative.    Genitourinary: Negative.    Musculoskeletal: Negative.    Skin: Negative.    Allergic/Immunologic: Negative.    Neurological: Negative.    Hematological: Negative.    Psychiatric/Behavioral: Negative.     All other systems reviewed and are negative.      Physical Exam     Initial Vitals [24 1711]   BP Pulse Resp Temp SpO2   (!) 147/86 81 20 97.2 °F (36.2 °C) 98 %      MAP       --         Physical Exam    Nursing note and vitals reviewed.  Constitutional: She appears well-developed and well-nourished. No distress.   HENT:   Head: Normocephalic and atraumatic.   Mouth/Throat: Oropharynx is clear and moist.   Eyes: Conjunctivae and EOM are normal. Pupils are equal, round, and reactive to light.   Neck: Neck supple.   Normal range of motion.  Cardiovascular:  Normal rate, regular rhythm, normal heart sounds and intact distal pulses.           Pulmonary/Chest: Breath sounds normal. No respiratory distress. She has no wheezes.   Abdominal: Abdomen is soft. Bowel sounds are normal. She exhibits no distension. There is no abdominal tenderness.   Musculoskeletal:         General: No tenderness or edema. Normal range of motion.      Cervical back: Normal range of motion and neck supple.     Neurological: She is alert and oriented to person, place, and time. She has normal strength. GCS score is 15. GCS eye subscore is 4. GCS verbal subscore is 5. GCS motor subscore is 6.   Skin: Skin is warm and dry. No rash noted.   Psychiatric: She has a normal mood and  affect. Thought content normal.         ED Course   Procedures  Labs Reviewed - No data to display       Imaging Results    None          Medications - No data to display  Medical Decision Making  Patient is requesting a refill of her daily medications. States that she is out of her Metformin 500 mg BID and her Gabapentin 300 mg TID. States that she has been out of her daily medications x 2 days. States that she is in the middle of switching to a new PCP and is waiting for her appointment. Denies any other complain or any other symptoms.     The history is provided by the patient.   Patient is awake, alert, afebrile, or nontoxic appearing in the ED.     Amount and/or Complexity of Data Reviewed  Discussion of management or test interpretation with external provider(s): Differential diagnosis (including but not limited to):   Judging by the patient's chief complaint and pertinent history, the patient has the following possible differential diagnoses, including but not limited to the following.  Some of these are deemed to be lower likelihood and some more likely based on my physical exam and history combined with possible lab work and/or imaging studies.   Please see the pertinent studies, and refer to the HPI.  Some of these diagnoses will take further evaluation to fully rule out, perhaps as an outpatient and the patient was encouraged to follow up when discharged for more comprehensive evaluation.  Medication Refill  Will refill patient's home medications. Instructed patient to keep appointment with her new PCP.                                        Clinical Impression:  Final diagnoses:  [Z76.0] Medication refill (Primary)          ED Disposition Condition    Discharge Stable          ED Prescriptions       Medication Sig Dispense Start Date End Date Auth. Provider    metFORMIN (GLUCOPHAGE) 500 MG tablet Take 1 tablet (500 mg total) by mouth 2 (two) times daily with meals. 60 tablet 1/2/2024 2/1/2024 Elva  WILIAM Murrell    gabapentin (NEURONTIN) 300 MG capsule Take 1 capsule (300 mg total) by mouth 3 (three) times daily. 90 capsule 1/2/2024 2/1/2024 Elaine Stevenson FNP          Follow-up Information       Follow up With Specialties Details Why Contact Info    Primary Care Provider  In 3 days               Elaine Stevenson FNP  01/02/24 1438

## 2024-03-16 ENCOUNTER — HOSPITAL ENCOUNTER (EMERGENCY)
Facility: HOSPITAL | Age: 49
Discharge: HOME | End: 2024-03-16
Attending: INTERNAL MEDICINE
Payer: MEDICAID

## 2024-03-16 VITALS
WEIGHT: 200 LBS | BODY MASS INDEX: 32.14 KG/M2 | HEART RATE: 72 BPM | DIASTOLIC BLOOD PRESSURE: 108 MMHG | HEIGHT: 66 IN | TEMPERATURE: 98 F | RESPIRATION RATE: 20 BRPM | SYSTOLIC BLOOD PRESSURE: 179 MMHG | OXYGEN SATURATION: 93 %

## 2024-03-16 DIAGNOSIS — Z76.0 MEDICATION REFILL: ICD-10-CM

## 2024-03-16 DIAGNOSIS — M79.603 ARM PAIN: ICD-10-CM

## 2024-03-16 DIAGNOSIS — S46.212A BICEPS STRAIN, LEFT, INITIAL ENCOUNTER: Primary | ICD-10-CM

## 2024-03-16 DIAGNOSIS — R03.0 ELEVATED BLOOD PRESSURE READING: ICD-10-CM

## 2024-03-16 PROCEDURE — 25000003 PHARM REV CODE 250: Performed by: PHYSICIAN ASSISTANT

## 2024-03-16 PROCEDURE — 93010 ELECTROCARDIOGRAM REPORT: CPT | Mod: ,,, | Performed by: INTERNAL MEDICINE

## 2024-03-16 PROCEDURE — 99283 EMERGENCY DEPT VISIT LOW MDM: CPT | Mod: 25

## 2024-03-16 PROCEDURE — 93005 ELECTROCARDIOGRAM TRACING: CPT

## 2024-03-16 RX ORDER — AMLODIPINE BESYLATE 5 MG/1
10 TABLET ORAL
Status: COMPLETED | OUTPATIENT
Start: 2024-03-16 | End: 2024-03-16

## 2024-03-16 RX ORDER — OLMESARTAN MEDOXOMIL 20 MG/1
20 TABLET ORAL DAILY
Qty: 30 TABLET | Refills: 2 | Status: SHIPPED | OUTPATIENT
Start: 2024-03-16 | End: 2024-05-11

## 2024-03-16 RX ORDER — GABAPENTIN 300 MG/1
300 CAPSULE ORAL 3 TIMES DAILY
Qty: 42 CAPSULE | Refills: 0 | Status: SHIPPED | OUTPATIENT
Start: 2024-03-16 | End: 2024-03-30

## 2024-03-16 RX ORDER — TIZANIDINE 4 MG/1
4 TABLET ORAL EVERY 8 HOURS PRN
Qty: 15 TABLET | Refills: 0 | Status: SHIPPED | OUTPATIENT
Start: 2024-03-16

## 2024-03-16 RX ADMIN — AMLODIPINE BESYLATE 10 MG: 5 TABLET ORAL at 09:03

## 2024-03-17 NOTE — ED PROVIDER NOTES
"     Source of History:  Patient, no limitations    Chief complaint:  Arm Pain (" My arm hurts after helping move furniture I was just worried because I had heart attack in past.")      HPI:  Serge Krishnamurthy is a 48 y.o. female presenting with Arm Pain (" My arm hurts after helping move furniture I was just worried because I had heart attack in past.")         The patient complains of pain in the anterior aspect of the left arm without radiation after heavy lifting. Onset of symptoms was a few hours ago. Patient describes pain as  tender . Pain severity now is mild. Pain is aggravated by palpation. Pain is alleviated by immobilization. Symptoms associated with pain include none. The patient denies other injuries.  Care prior to arrival consisted of nothing        Review of Systems   Constitutional symptoms:  Negative except as documented in HPI.   Skin symptoms:  Negative except as documented in HPI.   HEENT symptoms:  Negative except as documented in HPI.   Respiratory symptoms:  Negative except as documented in HPI.   Cardiovascular symptoms:  Negative except as documented in HPI.   Gastrointestinal symptoms:  Negative except as documented in HPI.    Genitourinary symptoms:  Negative except as documented in HPI.   Musculoskeletal symptoms:  Negative except as documented in HPI.   Neurologic symptoms:  Negative except as documented in HPI.   Psychiatric symptoms:  Negative except as documented in HPI.   Allergy/immunologic symptoms:  Negative except as documented in HPI.             Additional review of systems information: All other systems reviewed and otherwise negative.      Review of patient's allergies indicates:   Allergen Reactions    Lisinopril     Opioids - morphine analogues Other (See Comments)     Increase heart rate    Penicillins      Other reaction(s): short of breath, rash    Penicillins Hives    Toradol [ketorolac]      asprin    Opioids - morphine analogues Palpitations       PMH:  As per HPI and " "below:    Past Medical History:   Diagnosis Date    Coronary artery disease     Hypertension     NSTEMI (non-ST elevated myocardial infarction)         Family History   Problem Relation Age of Onset    Diabetes Mother     Heart disease Mother     Diabetes Father     Hypertension Father     Heart disease Father     Cancer Father     Febrile seizures Father        Past Surgical History:   Procedure Laterality Date    ANGIOGRAPHY      CHOLECYSTECTOMY      INTRAVASCULAR ULTRASOUND, CORONARY N/A 2023    Procedure: Ultrasound-coronary;  Surgeon: Evan Trejo MD;  Location: [facility] CATH LAB;  Service: Cardiology;  Laterality: N/A;    LEFT HEART CATHETERIZATION N/A 2023    Procedure: Left heart cath;  Surgeon: Evan Trejo MD;  Location: [facility] CATH LAB;  Service: Cardiology;  Laterality: N/A;    STENT, DRUG ELUTING, SINGLE VESSEL, CORONARY  2023    Procedure: Stent, Drug Eluting, Single Vessel, Coronary;  Surgeon: Evan Trejo MD;  Location: [facility] CATH LAB;  Service: Cardiology;;       Social History     Tobacco Use    Smoking status: Former     Current packs/day: 0.00     Types: Cigarettes     Quit date: 2023     Years since quittin.9    Smokeless tobacco: Never   Substance Use Topics    Alcohol use: Not Currently    Drug use: Never       Patient Active Problem List   Diagnosis    Cigarette nicotine dependence without complication    Primary hypertension    Gastroesophageal reflux disease    Primary insomnia    Depression with anxiety    Coronary artery disease    Vitamin D deficiency    Prediabetes    Mixed hyperlipidemia        Physical Exam:    BP (!) 179/108   Pulse 72   Temp 98.1 °F (36.7 °C) (Oral)   Resp 20   Ht 5' 6" (1.676 m)   Wt 90.7 kg (200 lb)   SpO2 (!) 93%   BMI 32.28 kg/m²     Nursing note and vital signs reviewed.    General:  Alert, no acute distress.   Skin: Normal for Ethnic Origin, No cyanosis  HEENT: Normocephalic and atraumatic, Vision unchanged, Pupils symmetric, " No icterus , Nasal mucosa is pink and moist  Cardiovascular:  Regular rate and rhythm, No edema  Chest Wall: No deformity, equal chest rise  Respiratory:  Lungs are clear to auscultation, respirations are non-labored.    Musculoskeletal:  No deformity, Normal perfusion to all extremities, left bicep tenderness that completely reproduces symptoms  Gastrointestinal:  Soft, Non distended  Neurological:  Alert and oriented, normal motor observed, normal speech observed.    Psychiatric:  Cooperative, appropriate mood & affect.        Labs that have been ordered have been independently reviewed and interpreted by myself.     Old Chart Reviewed.      Initial Impression/ Differential Dx:  Rotator cuff injury, tendonitis, bursitis, arthritis, cellulitis, septic joint, cardiac or intraabdominal cause, cervical radiculopathy, dislocation, fracture       MDM:      Reviewed Nurses Note.    Reviewed Pertinent old records.    Orders Placed This Encounter    EKG 12-lead    amLODIPine tablet 10 mg    tiZANidine (ZANAFLEX) 4 MG tablet    olmesartan (BENICAR) 20 MG tablet    gabapentin (NEURONTIN) 300 MG capsule                    Labs Reviewed - No data to display       No orders to display        No visits with results within 1 Day(s) from this visit.   Latest known visit with results is:   Admission on 09/22/2023, Discharged on 09/23/2023   Component Date Value Ref Range Status    Sodium Level 09/23/2023 138  136 - 145 mmol/L Final    Potassium Level 09/23/2023 4.8  3.5 - 5.1 mmol/L Final    Chloride 09/23/2023 112 (H)  98 - 107 mmol/L Final    Carbon Dioxide 09/23/2023 20 (L)  22 - 29 mmol/L Final    Glucose Level 09/23/2023 104 (H)  74 - 100 mg/dL Final    Blood Urea Nitrogen 09/23/2023 14.6  7.0 - 18.7 mg/dL Final    Creatinine 09/23/2023 0.77  0.55 - 1.02 mg/dL Final    Calcium Level Total 09/23/2023 8.6  8.4 - 10.2 mg/dL Final    Protein Total 09/23/2023 6.6  6.4 - 8.3 gm/dL Final    Albumin Level 09/23/2023 3.5  3.5 - 5.0  g/dL Final    Globulin 09/23/2023 3.1  2.4 - 3.5 gm/dL Final    Albumin/Globulin Ratio 09/23/2023 1.1  1.1 - 2.0 ratio Final    Bilirubin Total 09/23/2023 0.2  <=1.5 mg/dL Final    Alkaline Phosphatase 09/23/2023 130  40 - 150 unit/L Final    Alanine Aminotransferase 09/23/2023 17  0 - 55 unit/L Final    Aspartate Aminotransferase 09/23/2023 15  5 - 34 unit/L Final    eGFR 09/23/2023 >60  mls/min/1.73/m2 Final       Imaging Results    None           ECG Results              EKG 12-lead (Preliminary result)  Result time 03/16/24 21:10:09      Wet Read by Alec Saldana DO (03/16/24 21:10:09, [facility] - Emergency Dept, Emergency Medicine)    Independent ECG Interpretation:    Normal sinus rhythm at rate of 71. Normal intervals. Normal QRS. No acute ST or T wave abnormalities. Overall impression: No evidence of acute ischemia or arrhythmia.                                                                      Diagnostic Impression:    1. Biceps strain, left, initial encounter    2. Arm pain    3. Elevated blood pressure reading    4. Medication refill         ED Disposition Condition    Discharge Stable             Follow-up Information       [facility] - Emergency Dept.    Specialty: Emergency Medicine  Why: If symptoms worsen  Contact information:  [id number]  [facility] [id number] [phone]                            ED Prescriptions       Medication Sig Dispense Start Date End Date Auth. Provider    tiZANidine (ZANAFLEX) 4 MG tablet Take 1 tablet (4 mg total) by mouth every 8 (eight) hours as needed (spasms). 15 tablet 3/16/2024 -- Alec Saldana DO    olmesartan (BENICAR) 20 MG tablet Take 1 tablet (20 mg total) by mouth once daily. 30 tablet 3/16/2024 6/14/2024 Alec Saldana DO    gabapentin (NEURONTIN) 300 MG capsule Take 1 capsule (300 mg total) by mouth 3 (three) times daily. for 14 days 42 capsule 3/16/2024 3/30/2024  Alec Saldana, DO          Follow-up Information       Follow up With Specialties Details Why Contact Info    [facility] - Emergency Dept Emergency Medicine  If symptoms worsen [id number]  [facility] [id number] [phone]             Alec Saldana, DO  03/17/24 0010

## 2024-03-19 LAB
OHS QRS DURATION: 84 MS
OHS QTC CALCULATION: 439 MS

## 2024-04-07 ENCOUNTER — HOSPITAL ENCOUNTER (EMERGENCY)
Facility: HOSPITAL | Age: 49
Discharge: HOME | End: 2024-04-08
Attending: STUDENT IN AN ORGANIZED HEALTH CARE EDUCATION/TRAINING PROGRAM
Payer: MEDICAID

## 2024-04-07 DIAGNOSIS — K43.9 SUPRAUMBILICAL HERNIA: Primary | ICD-10-CM

## 2024-04-07 DIAGNOSIS — R10.9 ABDOMINAL PAIN, UNSPECIFIED ABDOMINAL LOCATION: ICD-10-CM

## 2024-04-07 LAB
ALBUMIN SERPL-MCNC: 3.5 G/DL (ref 3.5–5)
ALBUMIN/GLOB SERPL: 1 RATIO (ref 1.1–2)
ALP SERPL-CCNC: 151 UNIT/L (ref 40–150)
ALT SERPL-CCNC: 14 UNIT/L (ref 0–55)
APPEARANCE UR: CLEAR
AST SERPL-CCNC: 9 UNIT/L (ref 5–34)
BACTERIA #/AREA URNS AUTO: ABNORMAL /HPF
BASOPHILS # BLD AUTO: 0.05 X10(3)/MCL
BASOPHILS NFR BLD AUTO: 0.5 %
BILIRUB SERPL-MCNC: 0.2 MG/DL
BILIRUB UR QL STRIP.AUTO: NEGATIVE
BUN SERPL-MCNC: 14.6 MG/DL (ref 7–18.7)
CALCIUM SERPL-MCNC: 9.3 MG/DL (ref 8.4–10.2)
CHLORIDE SERPL-SCNC: 107 MMOL/L (ref 98–107)
CO2 SERPL-SCNC: 22 MMOL/L (ref 22–29)
COLOR UR AUTO: ABNORMAL
CREAT SERPL-MCNC: 0.81 MG/DL (ref 0.55–1.02)
EOSINOPHIL # BLD AUTO: 0.21 X10(3)/MCL (ref 0–0.9)
EOSINOPHIL NFR BLD AUTO: 2.1 %
ERYTHROCYTE [DISTWIDTH] IN BLOOD BY AUTOMATED COUNT: 14.4 % (ref 11.5–17)
GFR SERPLBLD CREATININE-BSD FMLA CKD-EPI: >60 MLS/MIN/1.73/M2
GLOBULIN SER-MCNC: 3.4 GM/DL (ref 2.4–3.5)
GLUCOSE SERPL-MCNC: 238 MG/DL (ref 74–100)
GLUCOSE UR QL STRIP.AUTO: ABNORMAL
HCT VFR BLD AUTO: 42.3 % (ref 37–47)
HGB BLD-MCNC: 14.1 G/DL (ref 12–16)
IMM GRANULOCYTES # BLD AUTO: 0.03 X10(3)/MCL (ref 0–0.04)
IMM GRANULOCYTES NFR BLD AUTO: 0.3 %
KETONES UR QL STRIP.AUTO: NEGATIVE
LEUKOCYTE ESTERASE UR QL STRIP.AUTO: NEGATIVE
LIPASE SERPL-CCNC: 21 U/L
LYMPHOCYTES # BLD AUTO: 3.55 X10(3)/MCL (ref 0.6–4.6)
LYMPHOCYTES NFR BLD AUTO: 36.2 %
MCH RBC QN AUTO: 32 PG (ref 27–31)
MCHC RBC AUTO-ENTMCNC: 33.3 G/DL (ref 33–36)
MCV RBC AUTO: 95.9 FL (ref 80–94)
MONOCYTES # BLD AUTO: 0.6 X10(3)/MCL (ref 0.1–1.3)
MONOCYTES NFR BLD AUTO: 6.1 %
MUCOUS THREADS URNS QL MICRO: ABNORMAL /LPF
NEUTROPHILS # BLD AUTO: 5.37 X10(3)/MCL (ref 2.1–9.2)
NEUTROPHILS NFR BLD AUTO: 54.8 %
NITRITE UR QL STRIP.AUTO: NEGATIVE
NRBC BLD AUTO-RTO: 0 %
PH UR STRIP.AUTO: 6 [PH]
PLATELET # BLD AUTO: 222 X10(3)/MCL (ref 130–400)
PMV BLD AUTO: 10.4 FL (ref 7.4–10.4)
POTASSIUM SERPL-SCNC: 4.8 MMOL/L (ref 3.5–5.1)
PROT SERPL-MCNC: 6.9 GM/DL (ref 6.4–8.3)
PROT UR QL STRIP.AUTO: ABNORMAL
RBC # BLD AUTO: 4.41 X10(6)/MCL (ref 4.2–5.4)
RBC #/AREA URNS AUTO: ABNORMAL /HPF
RBC UR QL AUTO: ABNORMAL
SODIUM SERPL-SCNC: 138 MMOL/L (ref 136–145)
SP GR UR STRIP.AUTO: 1.03 (ref 1–1.03)
SQUAMOUS #/AREA URNS LPF: ABNORMAL /HPF
UROBILINOGEN UR STRIP-ACNC: NORMAL
WBC # SPEC AUTO: 9.81 X10(3)/MCL (ref 4.5–11.5)
WBC #/AREA URNS AUTO: ABNORMAL /HPF

## 2024-04-07 PROCEDURE — 81001 URINALYSIS AUTO W/SCOPE: CPT | Performed by: PHYSICIAN ASSISTANT

## 2024-04-07 PROCEDURE — 80053 COMPREHEN METABOLIC PANEL: CPT | Performed by: PHYSICIAN ASSISTANT

## 2024-04-07 PROCEDURE — 83690 ASSAY OF LIPASE: CPT | Performed by: PHYSICIAN ASSISTANT

## 2024-04-07 PROCEDURE — 85025 COMPLETE CBC W/AUTO DIFF WBC: CPT | Performed by: PHYSICIAN ASSISTANT

## 2024-04-07 PROCEDURE — 99285 EMERGENCY DEPT VISIT HI MDM: CPT | Mod: 25

## 2024-04-08 VITALS
RESPIRATION RATE: 17 BRPM | DIASTOLIC BLOOD PRESSURE: 83 MMHG | HEART RATE: 66 BPM | WEIGHT: 200 LBS | TEMPERATURE: 98 F | SYSTOLIC BLOOD PRESSURE: 124 MMHG | BODY MASS INDEX: 32.14 KG/M2 | HEIGHT: 66 IN | OXYGEN SATURATION: 96 %

## 2024-04-08 PROCEDURE — 63600175 PHARM REV CODE 636 W HCPCS: Performed by: STUDENT IN AN ORGANIZED HEALTH CARE EDUCATION/TRAINING PROGRAM

## 2024-04-08 PROCEDURE — 25500020 PHARM REV CODE 255: Performed by: STUDENT IN AN ORGANIZED HEALTH CARE EDUCATION/TRAINING PROGRAM

## 2024-04-08 PROCEDURE — 96374 THER/PROPH/DIAG INJ IV PUSH: CPT | Mod: 59

## 2024-04-08 RX ORDER — ONDANSETRON HYDROCHLORIDE 2 MG/ML
4 INJECTION, SOLUTION INTRAVENOUS
Status: COMPLETED | OUTPATIENT
Start: 2024-04-08 | End: 2024-04-08

## 2024-04-08 RX ORDER — IBUPROFEN 600 MG/1
600 TABLET ORAL EVERY 6 HOURS PRN
Qty: 20 TABLET | Refills: 0 | Status: SHIPPED | OUTPATIENT
Start: 2024-04-08

## 2024-04-08 RX ORDER — HYDROCODONE BITARTRATE AND ACETAMINOPHEN 5; 325 MG/1; MG/1
1 TABLET ORAL EVERY 6 HOURS PRN
Qty: 12 TABLET | Refills: 0 | Status: SHIPPED | OUTPATIENT
Start: 2024-04-08

## 2024-04-08 RX ORDER — ONDANSETRON 4 MG/1
4 TABLET, FILM COATED ORAL EVERY 6 HOURS
Qty: 12 TABLET | Refills: 0 | Status: SHIPPED | OUTPATIENT
Start: 2024-04-08

## 2024-04-08 RX ORDER — GABAPENTIN 300 MG/1
300 CAPSULE ORAL 3 TIMES DAILY
Qty: 90 CAPSULE | Refills: 11 | Status: SHIPPED | OUTPATIENT
Start: 2024-04-08 | End: 2024-05-11

## 2024-04-08 RX ORDER — METFORMIN HYDROCHLORIDE 500 MG/1
500 TABLET ORAL 2 TIMES DAILY WITH MEALS
Qty: 180 TABLET | Refills: 3 | Status: SHIPPED | OUTPATIENT
Start: 2024-04-08 | End: 2025-04-08

## 2024-04-08 RX ADMIN — IOPAMIDOL 100 ML: 755 INJECTION, SOLUTION INTRAVENOUS at 12:04

## 2024-04-08 RX ADMIN — ONDANSETRON 4 MG: 2 INJECTION INTRAMUSCULAR; INTRAVENOUS at 01:04

## 2024-04-08 NOTE — FIRST PROVIDER EVALUATION
"Medical screening examination initiated.  I have conducted a focused provider triage encounter, findings are as follows:    Chief Complaint   Patient presents with    Abdominal Pain     Pt c/o abdominal pain above umbilicus x 3 days. + Nausea. Denies vomiting. Last BM today. Recently dx'd with umbilical hernia.      Brief history of present illness:  48 y.o. female presents to the ED with periumbilical abdominal pain onset 3 days ago with intermittent nausea. Hx of hernia. Last BM today     Vitals:    04/07/24 2021   BP: (!) 154/93   BP Location: Left arm   Patient Position: Sitting   Pulse: 70   Resp: 18   Temp: 97.8 °F (36.6 °C)   TempSrc: Oral   SpO2: 98%   Weight: 90.7 kg (200 lb)   Height: 5' 6" (1.676 m)       Pertinent physical exam:  Awake, alert, ambulatory, non-labored respirations    Brief workup plan:  labs, UA    Preliminary workup initiated; this workup will be continued and followed by the physician or advanced practice provider that is assigned to the patient when roomed.  "

## 2024-04-08 NOTE — DISCHARGE INSTRUCTIONS
Thanks for letting use take care of you today! It is our goal to give you courteous care and to keep you comfortable and informed. If you have any questions before you leave I will be happy to try and answer them.     Advice after your visit:  Your visit in the emergency department is NOT definitive care - please follow-up with your primary care doctor and/or specialist within 1-2 days. If you do not have a primary care physician call [phone] to schedule an appointment. Please return if you have any worsening in your condition or if you have any other concerns.    Return to the emergency department if any worsening symptoms including fever, chest pain, difficulty breathing, weakness, numbness, tingling, nausea, vomiting, inability to eat, drink or take your medication, or any other new symptoms or concerns arise.      Please signup for MyChart as noted below in your paperwork to review all labwork, imaging results, and any other incidental findings from today's visit.     If you had radiology exams like an XRAY or CT in the emergency Department the interpreation on them may be preliminary - there may be less time sensitive findings on the reports please obtain these reports within 24 hours from the hospital or by using your out on your mobile phone to access records.  Bring these to your primary care doctor and/or specialist for further review of incidental findings.    Please review any LAB WORK from your visit today with your primary care physician.    If you were prescribed OPIATE PAIN MEDICATION - please understand of these medications can be addictive, you may fill less of the prescription was written for, you do not have to take the full prescription.  You may discard what you do not use.  Please seek help if you feel you are having problems with addiction.  Do not drive or operate heavy machinery if you are taking sedating medications.  Do not mix these medications with alcohol.      If you had a SPLINT  placed in the emergency department if you have severe pain numbness tingling or discoloration of year digits please remove the splint and return to the emergency department for further evaluation as this may represent a sign of compromise to the nerves or blood vessels due to swelling.    If you had SUTURES in the emergency department please have them removed in the prescribed time frame typically within 7-14 days.  You may shower but please do not bathe or swim.  Keep the wounds clean and dry and covered with a clean dressing.  Please return if he have any signs of infection like redness or drainage or pain at the suture site.    Please take the full course of  any ANTIBIOTICS you were prescribed - incomplete courses of antibiotics can cause resistance to antibiotics in the future which will make it difficult to treat any infections you may have.

## 2024-04-08 NOTE — ED PROVIDER NOTES
Encounter Date: 2024       History     Chief Complaint   Patient presents with    Abdominal Pain     Pt c/o abdominal pain above umbilicus x 3 days. + Nausea. Denies vomiting. Last BM today. Recently dx'd with umbilical hernia.      See MDM    The history is provided by the patient. No  was used.     Review of patient's allergies indicates:   Allergen Reactions    Lisinopril     Opioids - morphine analogues Other (See Comments)     Increase heart rate    Penicillins      Other reaction(s): short of breath, rash    Penicillins Hives    Toradol [ketorolac]      asprin    Opioids - morphine analogues Palpitations     Past Medical History:   Diagnosis Date    Coronary artery disease     Hypertension     NSTEMI (non-ST elevated myocardial infarction)      Past Surgical History:   Procedure Laterality Date    ANGIOGRAPHY      CHOLECYSTECTOMY      INTRAVASCULAR ULTRASOUND, CORONARY N/A 2023    Procedure: Ultrasound-coronary;  Surgeon: Evan Trejo MD;  Location: [facility] CATH LAB;  Service: Cardiology;  Laterality: N/A;    LEFT HEART CATHETERIZATION N/A 2023    Procedure: Left heart cath;  Surgeon: Evan Trejo MD;  Location: [facility] CATH LAB;  Service: Cardiology;  Laterality: N/A;    STENT, DRUG ELUTING, SINGLE VESSEL, CORONARY  2023    Procedure: Stent, Drug Eluting, Single Vessel, Coronary;  Surgeon: Evan Trejo MD;  Location: [facility] CATH LAB;  Service: Cardiology;;     Family History   Problem Relation Age of Onset    Diabetes Mother     Heart disease Mother     Diabetes Father     Hypertension Father     Heart disease Father     Cancer Father     Febrile seizures Father      Social History     Tobacco Use    Smoking status: Former     Current packs/day: 0.00     Types: Cigarettes     Quit date: 2023     Years since quittin.9    Smokeless tobacco: Never   Substance Use Topics    Alcohol use: Not Currently    Drug use: Never     Review of Systems   Constitutional:   Negative for fever.   Respiratory:  Negative for cough and shortness of breath.    Cardiovascular:  Negative for chest pain.   Gastrointestinal:  Positive for abdominal pain and nausea.   Genitourinary:  Negative for difficulty urinating and dysuria.   Musculoskeletal:  Negative for gait problem.   Skin:  Negative for color change.   Neurological:  Negative for dizziness, speech difficulty and headaches.   Psychiatric/Behavioral:  Negative for hallucinations and suicidal ideas.    All other systems reviewed and are negative.      Physical Exam     Initial Vitals [04/07/24 2021]   BP Pulse Resp Temp SpO2   (!) 154/93 70 18 97.8 °F (36.6 °C) 98 %      MAP       --         Physical Exam    Nursing note and vitals reviewed.  Constitutional: She appears well-developed and well-nourished.   HENT:   Head: Normocephalic.   Eyes: EOM are normal.   Neck:   Normal range of motion.  Cardiovascular:  Normal rate, regular rhythm, normal heart sounds and intact distal pulses.           Pulmonary/Chest: Breath sounds normal. No respiratory distress.   Abdominal: Abdomen is soft. Bowel sounds are normal. There is abdominal tenderness (mild, supraumbilical).   No obvious hernia appreciated    Musculoskeletal:         General: Normal range of motion.      Cervical back: Normal range of motion.     Neurological: She is alert and oriented to person, place, and time. She has normal strength.   Skin: Skin is warm and dry.   Psychiatric: She has a normal mood and affect. Her behavior is normal. Judgment and thought content normal.         ED Course   Procedures  Labs Reviewed   COMPREHENSIVE METABOLIC PANEL - Abnormal; Notable for the following components:       Result Value    Glucose Level 238 (*)     Albumin/Globulin Ratio 1.0 (*)     Alkaline Phosphatase 151 (*)     All other components within normal limits   URINALYSIS, REFLEX TO URINE CULTURE - Abnormal; Notable for the following components:    Specific Gravity, UA 1.035 (*)      Protein, UA 1+ (*)     Glucose, UA 4+ (*)     Blood, UA Trace (*)     Mucous, UA Trace (*)     RBC, UA 6-10 (*)     All other components within normal limits   CBC WITH DIFFERENTIAL - Abnormal; Notable for the following components:    MCV 95.9 (*)     MCH 32.0 (*)     All other components within normal limits   LIPASE - Normal   CBC W/ AUTO DIFFERENTIAL    Narrative:     The following orders were created for panel order CBC auto differential.  Procedure                               Abnormality         Status                     ---------                               -----------         ------                     CBC with Differential[[id number]]       Abnormal            Final result                 Please view results for these tests on the individual orders.          Imaging Results              CT Abdomen Pelvis With IV Contrast NO Oral Contrast (Preliminary result)  Result time 04/08/24 01:19:54      Preliminary result by William Lin MD (04/08/24 01:19:54)                   Narrative:    START OF REPORT:  Technique: CT of the abdomen and pelvis was performed with axial images as well as sagittal and coronal reconstruction images with intravenous contrast.    Comparison: Comparison is with study dated March 14, 2023.    Clinical History: Pt c/o abdominal pain above umbilicus x 3 days. + Nausea. Denies vomiting. Last BM today. Recently dx'd with umbilical hernia.    Dosage Information: Automated Exposure Control was utilized.    Findings:  Lines and Tubes: None.  Thorax:  Lungs: There is stable mild nonspecific dependent change at the lung bases. No focal infiltrate or consolidation is seen.  Pleura: No effusions or thickening.  Heart: The heart size is within normal limits. Mild coronary artery calcification is seen.  Abdomen:  Abdominal Wall: No abdominal wall pathology is seen.  Liver: The liver appears unremarkable.  Biliary System: No extrahepatic biliary duct dilatation is seen.  Gallbladder: Surgical  clips are seen in the gallbladder fossa which may reflect prior cholecystectomy.  Pancreas: The pancreas appears unremarkable.  Spleen: The spleen appears unremarkable.  Adrenals: The adrenal glands appear unremarkable.  Kidneys: The kidneys appear unremarkable with no stones cysts masses or hydronephrosis.  Aorta: There is stable mild calcification of the abdominal aorta and its branches.  IVC: Unremarkable.  Bowel:  Esophagus: The visualized esophagus appears unremarkable.  Stomach: The stomach appears unremarkable.  Duodenum: Unremarkable appearing duodenum.  Small Bowel: The small bowel appears unremarkable.  Colon: Stable multiple diverticula are again seen predominantly in the descending and sigmoid colon. No associated inflammatory stranding is seen to suggest diverticulitis.  Appendix: The appendix appears unremarkable and is seen on series 2 image 101 to 117.  Peritoneum: No intraperitoneal free air or ascites is seen.    Pelvis:  Bladder: The bladder appears unremarkable.    Bony structures:  Dorsal Spine: There is stable moderate spondylosis of the visualized dorsal spine.  Bony Pelvis: There is stable mild degenerative change of the bilateral hips.      Impression:  1. No acute intraabdominal or pelvic solid organ or bowel pathology identified. Details and other findings as discussed above.                                         Medications   iopamidoL (ISOVUE-370) injection 100 mL (100 mLs Intravenous Given 4/8/24 0056)   ondansetron injection 4 mg (4 mg Intravenous Given 4/8/24 0146)     Medical Decision Making  Historian:  Patient.  Patient is a 48-year-old female  that presents with abdominal pain that has been present 3 days. Associated symptoms nausea. Surrounding information is states he was diagnosed with a hernia a few days ago. Exacerbated by nothing. Relieved by nothing. Patient treatment prior to arrival none. Risk factors include none. Other history pertaining to this complaint nothing.    Assessment:  See physical exam.  DD:  Abdominal hernia, appendicitis, colitis, pancreatitis  ED Course: History was obtained.  Physical was performed.     Problems Addressed:  Abdominal pain, unspecified abdominal location: acute illness or injury that poses a threat to life or bodily functions  Supraumbilical hernia: acute illness or injury that poses a threat to life or bodily functions    Amount and/or Complexity of Data Reviewed  Labs: ordered.  Radiology: ordered.    Risk  Prescription drug management.               ED Course as of 04/08/24 0422   Mon Apr 08, 2024   0212 CT negative - patient reports her pain has resolved on my exam she does not have any obvious umbilical hernia, mildly tender in her supraumbilical area but nothing to reduce no skin changes.  Will refer to surgery discharge at this time patient amenable to plan [AC]      ED Course User Index  [AC] Anil Fortune IV, MD                           Clinical Impression:  Final diagnoses:  [R10.9] Abdominal pain, unspecified abdominal location  [K43.9] Supraumbilical hernia (Primary)          ED Disposition Condition    Discharge           ED Prescriptions       Medication Sig Dispense Start Date End Date Auth. Provider    HYDROcodone-acetaminophen (NORCO) 5-325 mg per tablet Take 1 tablet by mouth every 6 (six) hours as needed for Pain. 12 tablet 4/8/2024 -- Anil Fortune IV, MD    ibuprofen (ADVIL,MOTRIN) 600 MG tablet Take 1 tablet (600 mg total) by mouth every 6 (six) hours as needed for Pain. 20 tablet 4/8/2024 -- Anil Fortune IV, MD    ondansetron (ZOFRAN) 4 MG tablet Take 1 tablet (4 mg total) by mouth every 6 (six) hours. 12 tablet 4/8/2024 -- Anil Fortune IV, MD    metFORMIN (GLUCOPHAGE) 500 MG tablet Take 1 tablet (500 mg total) by mouth 2 (two) times daily with meals. 180 tablet 4/8/2024 4/8/2025 Anil Fortune IV, MD    gabapentin (NEURONTIN) 300 MG capsule Take 1 capsule (300 mg total) by mouth 3 (three) times daily. 90  capsule 4/8/2024 4/8/2025 Anil Fortune IV, MD          Follow-up Information       Follow up With Specialties Details Why Contact Info    [facility] - Emergency Dept Emergency Medicine Go to  If symptoms worsen [address] [facility] [id number] [phone]    Primary care physician  Schedule an appointment as soon as possible for a visit   Follow up with you primary care physician.   If you do not have a primary care physician call [phone] to schedule an appointment.    Renetta Avitia, ARIANAP Nurse Practitioner Schedule an appointment as soon as possible for a visit   [address] [facility] [address]  [phone]      [facility] General Surgery Schedule an appointment as soon as possible for a visit   [address] [facility] [phone]             Anil Fortune IV, MD  04/08/24 0422

## 2024-05-11 ENCOUNTER — HOSPITAL ENCOUNTER (EMERGENCY)
Facility: HOSPITAL | Age: 49
Discharge: HOME | End: 2024-05-11
Attending: EMERGENCY MEDICINE
Payer: MEDICAID

## 2024-05-11 VITALS
HEART RATE: 86 BPM | BODY MASS INDEX: 36.96 KG/M2 | OXYGEN SATURATION: 98 % | DIASTOLIC BLOOD PRESSURE: 88 MMHG | HEIGHT: 66 IN | RESPIRATION RATE: 18 BRPM | TEMPERATURE: 99 F | SYSTOLIC BLOOD PRESSURE: 129 MMHG | WEIGHT: 230 LBS

## 2024-05-11 DIAGNOSIS — M54.9 CHRONIC BACK PAIN, UNSPECIFIED BACK LOCATION, UNSPECIFIED BACK PAIN LATERALITY: Primary | ICD-10-CM

## 2024-05-11 DIAGNOSIS — Z76.0 MEDICATION REFILL: ICD-10-CM

## 2024-05-11 DIAGNOSIS — G89.29 CHRONIC BACK PAIN, UNSPECIFIED BACK LOCATION, UNSPECIFIED BACK PAIN LATERALITY: Primary | ICD-10-CM

## 2024-05-11 PROCEDURE — 99284 EMERGENCY DEPT VISIT MOD MDM: CPT

## 2024-05-11 RX ORDER — GABAPENTIN 300 MG/1
300 CAPSULE ORAL 3 TIMES DAILY
Qty: 90 CAPSULE | Refills: 0 | Status: SHIPPED | OUTPATIENT
Start: 2024-05-11 | End: 2024-06-11 | Stop reason: SDUPTHER

## 2024-05-11 RX ORDER — OLMESARTAN MEDOXOMIL 20 MG/1
20 TABLET ORAL DAILY
Qty: 30 TABLET | Refills: 0 | Status: SHIPPED | OUTPATIENT
Start: 2024-05-11 | End: 2024-06-10

## 2024-05-11 RX ORDER — GABAPENTIN 300 MG/1
300 CAPSULE ORAL 3 TIMES DAILY
Qty: 90 CAPSULE | Refills: 0 | Status: CANCELLED | OUTPATIENT
Start: 2024-05-11 | End: 2024-06-10

## 2024-05-11 NOTE — ED TRIAGE NOTES
Pt complaint of back pain relating that she missed appt with PCP because car broke down and is now out of Rx Gabapentin 300 mg tid and Benicar 20mg daily

## 2024-05-11 NOTE — ED TRIAGE NOTES
Pt complaint of back pain relating that she missed appt because care broke down and she is now out of RX Gabapentin 300 mg tid: and Benicar 20 mg daily

## 2024-05-11 NOTE — ED PROVIDER NOTES
Encounter Date: 5/11/2024       History     Chief Complaint   Patient presents with    Back Pain     Pt complaint of back pain relating that she missed appt because care broke down and she is now out of RX Gabapentin 300 mg tid: and Benicar 20 mg daily     Patient presents with:  Back Pain: Pt complaint of back pain relating that she missed appt because care broke down and she is now out of RX Gabapentin 300 mg tid: and Benicar 20 mg daily          Back Pain   This is a chronic problem. The problem has been unchanged. The pain is associated with no known injury. The pain is present in the lumbar spine. The pain is The same all the time. Pertinent negatives include no chest pain, no fever, no numbness, no dysuria, no pelvic pain, no tingling and no weakness. She has tried muscle relaxants and NSAIDs for the symptoms.     Review of patient's allergies indicates:   Allergen Reactions    Lisinopril     Opioids - morphine analogues Other (See Comments)     Increase heart rate    Penicillins      Other reaction(s): short of breath, rash    Penicillins Hives    Toradol [ketorolac]      asprin    Opioids - morphine analogues Palpitations     Past Medical History:   Diagnosis Date    Coronary artery disease     Hypertension     NSTEMI (non-ST elevated myocardial infarction)      Past Surgical History:   Procedure Laterality Date    ANGIOGRAPHY      CHOLECYSTECTOMY      INTRAVASCULAR ULTRASOUND, CORONARY N/A 1/27/2023    Procedure: Ultrasound-coronary;  Surgeon: Evan Trejo MD;  Location: [facility] CATH LAB;  Service: Cardiology;  Laterality: N/A;    LEFT HEART CATHETERIZATION N/A 1/27/2023    Procedure: Left heart cath;  Surgeon: Evan Trejo MD;  Location: [facility] CATH LAB;  Service: Cardiology;  Laterality: N/A;    STENT, DRUG ELUTING, SINGLE VESSEL, CORONARY  1/27/2023    Procedure: Stent, Drug Eluting, Single Vessel, Coronary;  Surgeon: Evan Trejo MD;  Location: [facility] CATH LAB;  Service: Cardiology;;     Family  History   Problem Relation Name Age of Onset    Diabetes Mother      Heart disease Mother      Diabetes Father Mata Krishnamurthy     Hypertension Father Mata Krishnamurthy     Heart disease Father Mata Krishnamurthy     Cancer Father Mata Krishnamurthy     Febrile seizures Father Mata Krishnamurthy      Social History     Tobacco Use    Smoking status: Former     Current packs/day: 0.00     Types: Cigarettes     Quit date: 2023     Years since quittin.0    Smokeless tobacco: Never   Substance Use Topics    Alcohol use: Not Currently    Drug use: Never     Review of Systems   Constitutional:  Negative for fever.   HENT:  Negative for sore throat.    Respiratory:  Negative for shortness of breath.    Cardiovascular:  Negative for chest pain.   Gastrointestinal:  Negative for nausea.   Genitourinary:  Negative for dysuria and pelvic pain.   Musculoskeletal:  Positive for back pain.   Skin:  Negative for rash.   Neurological:  Negative for tingling, weakness and numbness.   Hematological:  Does not bruise/bleed easily.       Physical Exam     Initial Vitals [24 1818]   BP Pulse Resp Temp SpO2   129/88 86 18 98.6 °F (37 °C) 98 %      MAP       --         Physical Exam    Vitals reviewed.  Constitutional: She appears well-developed.   Abdominal: Abdomen is soft.   Musculoskeletal:      Lumbar back: Spasms and tenderness present. No swelling. Negative right straight leg raise test and negative left straight leg raise test.     Neurological: She is alert and oriented to person, place, and time. She has normal strength.   Skin: Skin is warm.   Psychiatric: She has a normal mood and affect. Her behavior is normal. Thought content normal.         ED Course   Procedures  Labs Reviewed - No data to display       Imaging Results    None          Medications - No data to display  Medical Decision Making  48-year-old female comes in with complaint of chronic low back pain, she did miss her appointment with her PCP and she  needs her gabapentin to be refilled her pain is chronic in nature.  She does not have any lower extremity radicular pain pattern her pain is localized to the back across.    Amount and/or Complexity of Data Reviewed  Discussion of management or test interpretation with external provider(s): We discussed with patient to continue with ice therapy and muscle relaxant as previously prescribed.  We are going to refill her medicines as previously prescribed and advised patient to follow up with PCP as scheduled.    Risk  Prescription drug management.    Judging by the patient's chief complaint and pertinent history, the patient has the following possible differential diagnoses, including but not limited to the following.  Some of these are deemed to be lower likelihood and some more likely based on my physical exam and history combined with possible lab work and/or imaging studies.   Please see the pertinent studies, and refer to the HPI.  Some of these diagnoses will take further evaluation to fully rule out, perhaps as an outpatient and the patient was encouraged to follow up when discharged for more comprehensive evaluation.    Sprain, strain, contusion, DDD,               The patient is resting comfortably in no acute distress.  She is hemodynamically stable and is without objective evidence for acute process requiring urgent intervention or hospitalization. I provided counseling to patient with regard to condition, the treatment plan, specific conditions for return, and the importance of follow up. Detailed written and verbal instructions provided to patient and she expressed a verbal understanding of the discharge instructions and overall management plan. Reiterated the importance of medication administration and safety and advised patient to follow up with primary care provider in 3-5 days or sooner if needed.  Answered questions at this time. The patient is stable for discharge.                    Clinical  Impression:  Final diagnoses:  [M54.9, G89.29] Chronic back pain, unspecified back location, unspecified back pain laterality (Primary)  [Z76.0] Medication refill          ED Disposition Condition    Discharge Stable          ED Prescriptions       Medication Sig Dispense Start Date End Date Auth. Provider    olmesartan (BENICAR) 20 MG tablet Take 1 tablet (20 mg total) by mouth once daily. 30 tablet 5/11/2024 6/10/2024 Diana Shrestha PA    gabapentin (NEURONTIN) 300 MG capsule Take 1 capsule (300 mg total) by mouth 3 (three) times daily. 90 capsule 5/11/2024 6/10/2024 Diana Shrestha PA          Follow-up Information       Follow up With Specialties Details Why Contact Info    [facility] - Emergency Dept Emergency Medicine  If symptoms worsen [address]  [facility] [phone]    Emanuel Kilpatrick MD Family Medicine  As needed [address] [facility] [address]  [phone]               Diana Shrestha PA  05/11/24 1927       Diana Shrestha PA  05/11/24 1928

## 2024-06-11 ENCOUNTER — HOSPITAL ENCOUNTER (EMERGENCY)
Facility: HOSPITAL | Age: 49
Discharge: HOME | End: 2024-06-11
Attending: STUDENT IN AN ORGANIZED HEALTH CARE EDUCATION/TRAINING PROGRAM
Payer: MEDICAID

## 2024-06-11 VITALS
HEART RATE: 85 BPM | HEIGHT: 66 IN | RESPIRATION RATE: 20 BRPM | BODY MASS INDEX: 32.14 KG/M2 | TEMPERATURE: 98 F | DIASTOLIC BLOOD PRESSURE: 83 MMHG | SYSTOLIC BLOOD PRESSURE: 108 MMHG | WEIGHT: 200 LBS | OXYGEN SATURATION: 98 %

## 2024-06-11 DIAGNOSIS — M54.50 CHRONIC LOW BACK PAIN WITHOUT SCIATICA, UNSPECIFIED BACK PAIN LATERALITY: ICD-10-CM

## 2024-06-11 DIAGNOSIS — G89.29 CHRONIC LOW BACK PAIN WITHOUT SCIATICA, UNSPECIFIED BACK PAIN LATERALITY: ICD-10-CM

## 2024-06-11 DIAGNOSIS — Z76.0 ENCOUNTER FOR MEDICATION REFILL: Primary | ICD-10-CM

## 2024-06-11 LAB
BACTERIA #/AREA URNS AUTO: ABNORMAL /HPF
BILIRUB UR QL STRIP.AUTO: NEGATIVE
CAOX CRY UR QL COMP ASSIST: ABNORMAL
CLARITY UR: CLEAR
COLOR UR AUTO: YELLOW
GLUCOSE UR QL STRIP: 250
HGB UR QL STRIP: ABNORMAL
KETONES UR QL STRIP: NEGATIVE
LEUKOCYTE ESTERASE UR QL STRIP: NEGATIVE
MUCOUS THREADS URNS QL MICRO: ABNORMAL /LPF
NITRITE UR QL STRIP: NEGATIVE
PH UR STRIP: 6 [PH]
PROT UR QL STRIP: NEGATIVE
RBC #/AREA URNS AUTO: ABNORMAL /HPF
SP GR UR STRIP.AUTO: 1.02 (ref 1–1.03)
SQUAMOUS #/AREA URNS AUTO: ABNORMAL /HPF
UROBILINOGEN UR STRIP-ACNC: 0.2
WBC #/AREA URNS AUTO: ABNORMAL /HPF

## 2024-06-11 PROCEDURE — 81003 URINALYSIS AUTO W/O SCOPE: CPT | Performed by: STUDENT IN AN ORGANIZED HEALTH CARE EDUCATION/TRAINING PROGRAM

## 2024-06-11 PROCEDURE — 99282 EMERGENCY DEPT VISIT SF MDM: CPT

## 2024-06-11 RX ORDER — CLOPIDOGREL BISULFATE 75 MG/1
75 TABLET ORAL DAILY
Qty: 30 TABLET | Refills: 0 | Status: SHIPPED | OUTPATIENT
Start: 2024-06-11 | End: 2024-07-11

## 2024-06-11 RX ORDER — GABAPENTIN 300 MG/1
300 CAPSULE ORAL 3 TIMES DAILY
Qty: 90 CAPSULE | Refills: 0 | Status: SHIPPED | OUTPATIENT
Start: 2024-06-11 | End: 2024-07-11

## 2024-06-11 NOTE — ED PROVIDER NOTES
Encounter Date: 6/11/2024       History     Chief Complaint   Patient presents with    Back Pain     Pt c/o back pain onset 4 days ago, denies injury. States also need med refill due to her missing her physician appt.     HPI    48-year-old female with a past medical history of hypertension, CAD with stents, and chronic back pain presents emergency room for refill.  Patient states that she is switching PCP's wants a refill for her gabapentin and Plavix.  States she is chronic back pain with no new symptoms.  States he ran out of her medications and wants to have a refill before she has an appointment upcoming this month with a new PCP.    Review of patient's allergies indicates:   Allergen Reactions    Lisinopril     Opioids - morphine analogues Other (See Comments)     Increase heart rate    Penicillins      Other reaction(s): short of breath, rash    Penicillins Hives    Toradol [ketorolac]      asprin    Opioids - morphine analogues Palpitations     Past Medical History:   Diagnosis Date    Coronary artery disease     Hypertension     NSTEMI (non-ST elevated myocardial infarction)      Past Surgical History:   Procedure Laterality Date    ANGIOGRAPHY      CHOLECYSTECTOMY      INTRAVASCULAR ULTRASOUND, CORONARY N/A 1/27/2023    Procedure: Ultrasound-coronary;  Surgeon: Evan Trejo MD;  Location: [facility] CATH LAB;  Service: Cardiology;  Laterality: N/A;    LEFT HEART CATHETERIZATION N/A 1/27/2023    Procedure: Left heart cath;  Surgeon: Evan Trejo MD;  Location: [facility] CATH LAB;  Service: Cardiology;  Laterality: N/A;    STENT, DRUG ELUTING, SINGLE VESSEL, CORONARY  1/27/2023    Procedure: Stent, Drug Eluting, Single Vessel, Coronary;  Surgeon: Evan Trejo MD;  Location: [facility] CATH LAB;  Service: Cardiology;;     Family History   Problem Relation Name Age of Onset    Diabetes Mother      Heart disease Mother      Diabetes Father Mata Stauffervivian     Hypertension Father Mata Stauffervivian     Heart disease  Father Mata Krishnamurthy     Cancer Father Mata Krishnamurthy     Febrile seizures Father Mata Krishnamurthy      Social History     Tobacco Use    Smoking status: Former     Current packs/day: 0.00     Types: Cigarettes     Quit date: 2023     Years since quittin.1    Smokeless tobacco: Never   Substance Use Topics    Alcohol use: Not Currently    Drug use: Never     Review of Systems   Constitutional:  Negative for fever.   Respiratory:  Negative for cough.    Cardiovascular:  Negative for chest pain.   Gastrointestinal:  Negative for abdominal pain, constipation, diarrhea, nausea and vomiting.   Musculoskeletal:  Positive for back pain (chronic).   Neurological:  Negative for headaches.   All other systems reviewed and are negative.      Physical Exam     Initial Vitals [24 1306]   BP Pulse Resp Temp SpO2   108/83 85 20 97.7 °F (36.5 °C) 98 %      MAP       --         Physical Exam    Nursing note and vitals reviewed.  Constitutional: She appears well-developed and well-nourished. No distress.   Cardiovascular:  Normal rate and regular rhythm.           Pulmonary/Chest: Breath sounds normal. No respiratory distress. She has no wheezes. She has no rhonchi. She has no rales.   Abdominal: Abdomen is soft. There is no abdominal tenderness. There is no rebound and no guarding.   Musculoskeletal:         General: No tenderness. Normal range of motion.     Neurological: She is alert and oriented to person, place, and time. She has normal strength.   Skin: Skin is warm. Capillary refill takes less than 2 seconds.         ED Course   Procedures  Labs Reviewed   URINALYSIS, REFLEX TO URINE CULTURE          Imaging Results    None          Medications - No data to display  Medical Decision Making  Initial Assessment:   Medication refill  Differential Diagnosis:   Judging by the patient's chief complaint and pertinent history, the patient has the following possible differential diagnoses, including but not limited  to the following.  Some of these are deemed to be lower likelihood and some more likely based on my physical exam and history combined with possible lab work and/or imaging studies.   Please see the pertinent studies, and refer to the HPI.  Some of these diagnoses will take further evaluation to fully rule out, perhaps as an outpatient and the patient was encouraged to follow up when discharged for more comprehensive evaluation.  Medication refill, current back pain,  as well as multiple other possible etiologies      Risk  Prescription drug management.  Diagnosis or treatment significantly limited by social determinants of health.                                      Clinical Impression:  Final diagnoses:  [Z76.0] Encounter for medication refill (Primary)  [M54.50, G89.29] Chronic low back pain without sciatica, unspecified back pain laterality          ED Disposition Condition    Discharge Stable          ED Prescriptions       Medication Sig Dispense Start Date End Date Auth. Provider    clopidogreL (PLAVIX) 75 mg tablet Take 1 tablet (75 mg total) by mouth once daily. 30 tablet 6/11/2024 7/11/2024 Umesh He MD    gabapentin (NEURONTIN) 300 MG capsule Take 1 capsule (300 mg total) by mouth 3 (three) times daily. 90 capsule 6/11/2024 7/11/2024 Umesh He MD          Follow-up Information       Follow up With Specialties Details Why Contact Info    Emanuel Kilpatrick MD Family Medicine Schedule an appointment as soon as possible for a visit   [address] [place] [id number]  [phone]      [facility] - Emergency Dept Emergency Medicine Go to  If symptoms worsen [id number]  [facility] [phone]             Umesh He MD  06/11/24 1703

## 2024-07-20 ENCOUNTER — HOSPITAL ENCOUNTER (EMERGENCY)
Facility: HOSPITAL | Age: 49
Discharge: HOME | End: 2024-07-20
Attending: EMERGENCY MEDICINE
Payer: MEDICAID

## 2024-07-20 VITALS
OXYGEN SATURATION: 99 % | DIASTOLIC BLOOD PRESSURE: 62 MMHG | HEART RATE: 72 BPM | TEMPERATURE: 99 F | BODY MASS INDEX: 40.18 KG/M2 | RESPIRATION RATE: 20 BRPM | SYSTOLIC BLOOD PRESSURE: 124 MMHG | WEIGHT: 250 LBS | HEIGHT: 66 IN

## 2024-07-20 DIAGNOSIS — B37.9 CANDIDIASIS: ICD-10-CM

## 2024-07-20 DIAGNOSIS — H00.014 HORDEOLUM EXTERNUM OF LEFT UPPER EYELID: ICD-10-CM

## 2024-07-20 DIAGNOSIS — Z91.148 NONCOMPLIANCE WITH MEDICATIONS: ICD-10-CM

## 2024-07-20 DIAGNOSIS — R73.9 HYPERGLYCEMIA: Primary | ICD-10-CM

## 2024-07-20 LAB
ALBUMIN SERPL-MCNC: 3.4 G/DL (ref 3.5–5)
ALBUMIN/GLOB SERPL: 0.9 RATIO (ref 1.1–2)
ALP SERPL-CCNC: 176 UNIT/L (ref 40–150)
ALT SERPL-CCNC: 16 UNIT/L (ref 0–55)
ANION GAP SERPL CALC-SCNC: 11 MEQ/L
AST SERPL-CCNC: 11 UNIT/L (ref 5–34)
B-OH-BUTYR SERPL-MCNC: 0.1 MMOL/L
BACTERIA #/AREA URNS AUTO: ABNORMAL /HPF
BASOPHILS # BLD AUTO: 0.04 X10(3)/MCL
BASOPHILS NFR BLD AUTO: 0.4 %
BILIRUB SERPL-MCNC: 0.3 MG/DL
BILIRUB UR QL STRIP.AUTO: NEGATIVE
BUN SERPL-MCNC: 11.4 MG/DL (ref 7–18.7)
CALCIUM SERPL-MCNC: 9.2 MG/DL (ref 8.4–10.2)
CHLORIDE SERPL-SCNC: 105 MMOL/L (ref 98–107)
CLARITY UR: CLEAR
CO2 SERPL-SCNC: 20 MMOL/L (ref 22–29)
COLOR UR AUTO: YELLOW
CREAT SERPL-MCNC: 1.12 MG/DL (ref 0.55–1.02)
CREAT/UREA NIT SERPL: 10
EOSINOPHIL # BLD AUTO: 0.27 X10(3)/MCL (ref 0–0.9)
EOSINOPHIL NFR BLD AUTO: 2.4 %
ERYTHROCYTE [DISTWIDTH] IN BLOOD BY AUTOMATED COUNT: 14.8 % (ref 11.5–17)
GFR SERPLBLD CREATININE-BSD FMLA CKD-EPI: >60 ML/MIN/1.73/M2
GLOBULIN SER-MCNC: 3.8 GM/DL (ref 2.4–3.5)
GLUCOSE SERPL-MCNC: 388 MG/DL (ref 74–100)
GLUCOSE UR QL STRIP: >=1000
HCT VFR BLD AUTO: 43.6 % (ref 37–47)
HGB BLD-MCNC: 14.3 G/DL (ref 12–16)
HGB UR QL STRIP: ABNORMAL
IMM GRANULOCYTES # BLD AUTO: 0.08 X10(3)/MCL (ref 0–0.04)
IMM GRANULOCYTES NFR BLD AUTO: 0.7 %
KETONES UR QL STRIP: NEGATIVE
LEUKOCYTE ESTERASE UR QL STRIP: NEGATIVE
LIPASE SERPL-CCNC: 22 U/L
LYMPHOCYTES # BLD AUTO: 2.97 X10(3)/MCL (ref 0.6–4.6)
LYMPHOCYTES NFR BLD AUTO: 26.9 %
MAGNESIUM SERPL-MCNC: 1.74 MG/DL (ref 1.6–2.6)
MCH RBC QN AUTO: 31.5 PG (ref 27–31)
MCHC RBC AUTO-ENTMCNC: 32.8 G/DL (ref 33–36)
MCV RBC AUTO: 96 FL (ref 80–94)
MONOCYTES # BLD AUTO: 0.59 X10(3)/MCL (ref 0.1–1.3)
MONOCYTES NFR BLD AUTO: 5.3 %
NEUTROPHILS # BLD AUTO: 7.1 X10(3)/MCL (ref 2.1–9.2)
NEUTROPHILS NFR BLD AUTO: 64.3 %
NITRITE UR QL STRIP: NEGATIVE
NRBC BLD AUTO-RTO: 0 %
PH UR STRIP: 6 [PH]
PLATELET # BLD AUTO: 240 X10(3)/MCL (ref 130–400)
PMV BLD AUTO: 10.1 FL (ref 7.4–10.4)
POTASSIUM SERPL-SCNC: 4.8 MMOL/L (ref 3.5–5.1)
PROT SERPL-MCNC: 7.2 GM/DL (ref 6.4–8.3)
PROT UR QL STRIP: NEGATIVE
RBC # BLD AUTO: 4.54 X10(6)/MCL (ref 4.2–5.4)
RBC #/AREA URNS AUTO: ABNORMAL /HPF
SODIUM SERPL-SCNC: 136 MMOL/L (ref 136–145)
SP GR UR STRIP.AUTO: 1.02 (ref 1–1.03)
SQUAMOUS #/AREA URNS AUTO: ABNORMAL /HPF
UROBILINOGEN UR STRIP-ACNC: 0.2
WBC # BLD AUTO: 11.05 X10(3)/MCL (ref 4.5–11.5)
WBC #/AREA URNS AUTO: ABNORMAL /HPF
YEAST UR QL AUTO: ABNORMAL /HPF

## 2024-07-20 PROCEDURE — 25000003 PHARM REV CODE 250: Performed by: NURSE PRACTITIONER

## 2024-07-20 PROCEDURE — 82010 KETONE BODYS QUAN: CPT | Performed by: NURSE PRACTITIONER

## 2024-07-20 PROCEDURE — 96360 HYDRATION IV INFUSION INIT: CPT

## 2024-07-20 PROCEDURE — 81001 URINALYSIS AUTO W/SCOPE: CPT | Performed by: NURSE PRACTITIONER

## 2024-07-20 PROCEDURE — 99284 EMERGENCY DEPT VISIT MOD MDM: CPT | Mod: 25

## 2024-07-20 PROCEDURE — 83735 ASSAY OF MAGNESIUM: CPT | Performed by: NURSE PRACTITIONER

## 2024-07-20 PROCEDURE — 80053 COMPREHEN METABOLIC PANEL: CPT | Performed by: NURSE PRACTITIONER

## 2024-07-20 PROCEDURE — 83690 ASSAY OF LIPASE: CPT | Performed by: NURSE PRACTITIONER

## 2024-07-20 PROCEDURE — 81003 URINALYSIS AUTO W/O SCOPE: CPT | Performed by: NURSE PRACTITIONER

## 2024-07-20 PROCEDURE — 96361 HYDRATE IV INFUSION ADD-ON: CPT

## 2024-07-20 PROCEDURE — 85025 COMPLETE CBC W/AUTO DIFF WBC: CPT | Performed by: NURSE PRACTITIONER

## 2024-07-20 RX ORDER — ERYTHROMYCIN 5 MG/G
OINTMENT OPHTHALMIC
Qty: 3.5 G | Refills: 0 | Status: SHIPPED | OUTPATIENT
Start: 2024-07-20

## 2024-07-20 RX ORDER — SODIUM CHLORIDE 9 MG/ML
1000 INJECTION, SOLUTION INTRAVENOUS
Status: COMPLETED | OUTPATIENT
Start: 2024-07-20 | End: 2024-07-20

## 2024-07-20 RX ORDER — METFORMIN HYDROCHLORIDE 500 MG/1
1000 TABLET ORAL
Status: COMPLETED | OUTPATIENT
Start: 2024-07-20 | End: 2024-07-20

## 2024-07-20 RX ORDER — FLUCONAZOLE 150 MG/1
150 TABLET ORAL
Qty: 2 TABLET | Refills: 0 | Status: SHIPPED | OUTPATIENT
Start: 2024-07-20 | End: 2024-07-24

## 2024-07-20 RX ORDER — METFORMIN HYDROCHLORIDE 500 MG/1
1000 TABLET ORAL 2 TIMES DAILY WITH MEALS
Qty: 120 TABLET | Refills: 1 | Status: SHIPPED | OUTPATIENT
Start: 2024-07-20 | End: 2024-08-19

## 2024-07-20 RX ADMIN — SODIUM CHLORIDE 1000 ML: 9 INJECTION, SOLUTION INTRAVENOUS at 07:07

## 2024-07-20 RX ADMIN — SODIUM CHLORIDE 1000 ML: 9 INJECTION, SOLUTION INTRAVENOUS at 09:07

## 2024-07-20 RX ADMIN — METFORMIN HYDROCHLORIDE 1000 MG: 500 TABLET, FILM COATED ORAL at 09:07

## 2024-07-20 NOTE — ED TRIAGE NOTES
C/o hyperglycemia, blurry vision, frequent urination and CBG reading of 521 s/p running out of metformin on 7/10/2024.

## 2024-07-21 NOTE — DISCHARGE INSTRUCTIONS
Warm compresses to left eye. Use erythromycin to lower lid and blink. Take metformin twice a day. Drink water. Watch your diet and really follow a diabetic diet. Keep appointment with primary care provider on August 7th.

## 2024-07-21 NOTE — ED PROVIDER NOTES
Encounter Date: 2024       History     Chief Complaint   Patient presents with    Hyperglycemia     C/o hyperglycemia, blurry vision, frequent urination and CBG reading of 521 s/p running out of metformin on 7/10/2024.      See MDM    The history is provided by the patient. No  was used.     Review of patient's allergies indicates:   Allergen Reactions    Lisinopril     Opioids - morphine analogues Other (See Comments)     Increase heart rate    Penicillins      Other reaction(s): short of breath, rash    Penicillins Hives    Toradol [ketorolac]      asprin    Opioids - morphine analogues Palpitations     Past Medical History:   Diagnosis Date    Coronary artery disease     Hypertension     NSTEMI (non-ST elevated myocardial infarction)      Past Surgical History:   Procedure Laterality Date    ANGIOGRAPHY      CHOLECYSTECTOMY      INTRAVASCULAR ULTRASOUND, CORONARY N/A 2023    Procedure: Ultrasound-coronary;  Surgeon: Evan Trejo MD;  Location: [facility] CATH LAB;  Service: Cardiology;  Laterality: N/A;    LEFT HEART CATHETERIZATION N/A 2023    Procedure: Left heart cath;  Surgeon: Evan Trejo MD;  Location: [facility] CATH LAB;  Service: Cardiology;  Laterality: N/A;    STENT, DRUG ELUTING, SINGLE VESSEL, CORONARY  2023    Procedure: Stent, Drug Eluting, Single Vessel, Coronary;  Surgeon: Evan Trejo MD;  Location: [facility] CATH LAB;  Service: Cardiology;;     Family History   Problem Relation Name Age of Onset    Diabetes Mother      Heart disease Mother      Diabetes Father Mata Bernardcandy     Hypertension Father Mata Bernardbeckaarnold     Heart disease Father Mata Stauffervivian     Cancer Father Mata Stauffervivian     Febrile seizures Father Mata Raghu      Social History     Tobacco Use    Smoking status: Former     Current packs/day: 0.00     Types: Cigarettes     Quit date: 2023     Years since quittin.2    Smokeless tobacco: Never   Substance Use Topics    Alcohol  use: Not Currently    Drug use: Never     Review of Systems   Endocrine: Positive for polydipsia, polyphagia and polyuria.   All other systems reviewed and are negative.      Physical Exam     Initial Vitals [07/20/24 1846]   BP Pulse Resp Temp SpO2   122/80 81 18 98.6 °F (37 °C) 95 %      MAP       --         Physical Exam    Nursing note and vitals reviewed.  Constitutional: She appears well-developed and well-nourished.   Eyes: Conjunctivae are normal.   Cardiovascular:  Normal rate, regular rhythm and normal heart sounds.           Pulmonary/Chest: Breath sounds normal. No respiratory distress.   Abdominal: Abdomen is soft. She exhibits no distension. There is no abdominal tenderness.   Musculoskeletal:         General: Normal range of motion.     Neurological: She is alert and oriented to person, place, and time. She has normal strength.   Skin: Skin is warm and dry.   Psychiatric: She has a normal mood and affect.         ED Course   Procedures  Labs Reviewed   CBC WITH DIFFERENTIAL - Abnormal       Result Value    WBC 11.05      RBC 4.54      Hgb 14.3      Hct 43.6      MCV 96.0 (*)     MCH 31.5 (*)     MCHC 32.8 (*)     RDW 14.8      Platelet 240      MPV 10.1      Neut % 64.3      Lymph % 26.9      Mono % 5.3      Eos % 2.4      Basophil % 0.4      Lymph # 2.97      Neut # 7.10      Mono # 0.59      Eos # 0.27      Baso # 0.04      IG# 0.08 (*)     IG% 0.7      NRBC% 0.0     COMPREHENSIVE METABOLIC PANEL - Abnormal    Sodium 136      Potassium 4.8      Chloride 105      CO2 20 (*)     Glucose 388 (*)     Blood Urea Nitrogen 11.4      Creatinine 1.12 (*)     Calcium 9.2      Protein Total 7.2      Albumin 3.4 (*)     Globulin 3.8 (*)     Albumin/Globulin Ratio 0.9 (*)     Bilirubin Total 0.3       (*)     ALT 16      AST 11      eGFR >60      Anion Gap 11.0      BUN/Creatinine Ratio 10     URINALYSIS, REFLEX TO URINE CULTURE - Abnormal    Color, UA Yellow      Appearance, UA Clear      Specific  Gravity, UA 1.020      pH, UA 6.0      Protein, UA Negative      Glucose, UA >=1000 (*)     Ketones, UA Negative      Blood, UA Trace-Intact (*)     Bilirubin, UA Negative      Urobilinogen, UA 0.2      Nitrites, UA Negative      Leukocyte Esterase, UA Negative     URINALYSIS, MICROSCOPIC - Abnormal    Bacteria, UA None Seen      Yeast, UA Moderate (*)     RBC, UA 6-10 (*)     WBC, UA 0-2      Squamous Epithelial Cells, UA Few (*)    MAGNESIUM - Normal    Magnesium Level 1.74     LIPASE - Normal    Lipase Level 22     BETA - HYDROXYBUTYRATE, SERUM   POCT GLUCOSE, HAND-HELD DEVICE          Imaging Results    None          Medications   sodium chloride 0.9% bolus 1,000 mL 1,000 mL (0 mLs Intravenous Stopped 7/20/24 2038)   metFORMIN tablet 1,000 mg (1,000 mg Oral Given 7/20/24 2100)   0.9%  NaCl infusion (1,000 mLs Intravenous New Bag 7/20/24 2100)     Medical Decision Making  47 y/o female presents with being out of her metformin since 7/10/24 since she missed her appointment with her pcp. She states she can't afford the missed appt fee so she has another appt set up with new pcp August 7th. She states she started to have symptoms of urinary frequency, polydipsia, just not feeling well. No cp. No sob. She is a smoker. She does haver her bp and cholesterol medicine still. Glucose at her mom's home was in the 500's. No vomiting. She doesn't follow diabetic diet.     UA shows yeast and no ketones. Anion gap 11. Creat 1.1. glucose 388 in labs. Repeat glucose 343. Will give 2nd liter of IVF here with metformin 1000mg. Not in dka. Stable for discharge. Discussed in detail diet and diabetes and importance it plays. Information given on this. She also now notes a small bump left corner eye so encouraged warm compresses and erythromycin ointment. Will cover yeast in urine (likely due to her dm uncontrolled) with diflucan. She has f/u. ER precuations given.     Amount and/or Complexity of Data Reviewed  Labs: ordered.  Decision-making details documented in ED Course.    Risk  Prescription drug management.      Additional MDM:   Differential Diagnosis:   Other: The following diagnoses were also considered and will be evaluated: DKA, hyperglycemia and dehydration.                                   Clinical Impression:  Final diagnoses:  [R73.9] Hyperglycemia (Primary)  [Z91.148] Noncompliance with medications  [H00.014] Hordeolum externum of left upper eyelid  [B37.9] Candidiasis          ED Disposition Condition    Discharge Stable          ED Prescriptions       Medication Sig Dispense Start Date End Date Auth. Provider    metFORMIN (GLUCOPHAGE) 500 MG tablet Take 2 tablets (1,000 mg total) by mouth 2 (two) times daily with meals. 120 tablet 7/20/2024 8/19/2024 Maria Teresa Rubio FNP    erythromycin (ROMYCIN) ophthalmic ointment Place a 1/2 inch ribbon of ointment into the lower eyelid. 3.5 g 7/20/2024 -- Maria Teresa Rubio FNP    fluconazole (DIFLUCAN) 150 MG Tab Take 1 tablet (150 mg total) by mouth every 72 hours. for 2 doses 2 tablet 7/20/2024 7/24/2024 Maria Teresa Rubio FNP          Follow-up Information       Follow up With Specialties Details Why Contact Info    Emanuel Kilpatrick MD Family Medicine   [address] [place] [address]  [phone]               Maria Teresa Rubio FNP  07/20/24 1335

## 2024-07-22 ENCOUNTER — HOSPITAL ENCOUNTER (EMERGENCY)
Facility: HOSPITAL | Age: 49
Discharge: HOME | End: 2024-07-22
Attending: EMERGENCY MEDICINE
Payer: MEDICAID

## 2024-07-22 VITALS
DIASTOLIC BLOOD PRESSURE: 85 MMHG | SYSTOLIC BLOOD PRESSURE: 126 MMHG | RESPIRATION RATE: 18 BRPM | WEIGHT: 250 LBS | HEIGHT: 66 IN | HEART RATE: 78 BPM | OXYGEN SATURATION: 96 % | TEMPERATURE: 99 F | BODY MASS INDEX: 40.18 KG/M2

## 2024-07-22 DIAGNOSIS — L03.314 CELLULITIS OF GROIN: Primary | ICD-10-CM

## 2024-07-22 DIAGNOSIS — Z76.0 MEDICATION REFILL: ICD-10-CM

## 2024-07-22 RX ORDER — CLOPIDOGREL BISULFATE 75 MG/1
75 TABLET ORAL DAILY
Qty: 30 TABLET | Refills: 0 | Status: SHIPPED | OUTPATIENT
Start: 2024-07-22 | End: 2024-08-21

## 2024-07-22 RX ORDER — SULFAMETHOXAZOLE AND TRIMETHOPRIM 800; 160 MG/1; MG/1
1 TABLET ORAL 2 TIMES DAILY
Qty: 14 TABLET | Refills: 0 | Status: SHIPPED | OUTPATIENT
Start: 2024-07-22 | End: 2024-07-29

## 2024-07-22 RX ORDER — MUPIROCIN 20 MG/G
OINTMENT TOPICAL 3 TIMES DAILY
Qty: 15 G | Refills: 0 | Status: SHIPPED | OUTPATIENT
Start: 2024-07-22

## 2024-07-22 RX ORDER — GABAPENTIN 300 MG/1
300 CAPSULE ORAL 3 TIMES DAILY
Qty: 90 CAPSULE | Refills: 0 | Status: SHIPPED | OUTPATIENT
Start: 2024-07-22 | End: 2024-08-21

## 2024-07-23 NOTE — ED PROVIDER NOTES
Encounter Date: 7/22/2024       History     Chief Complaint   Patient presents with    Abscess     48 y.o. White female with a history of DM, hypertension, and NSTEMI presents to Emergency Department with a chief complaint of possible abscess to R groin. Symptoms began two days ago and have been constant since onset. Patient seen on yesterday for hyperglycemia. States blood sugar has been controlled since last visit. Associated symptoms include redness, tenderness, and swelling to R groin. Symptoms are aggravated with palpation and there are no alleviating factors. The patient denies CP, SOB, fever, chills, dizziness, or vomiting. No other reported symptoms at this time      The history is provided by the patient. No  was used.   General Illness   The current episode started two days ago. The problem occurs continuously. The problem has been unchanged. Pertinent negatives include no fever, no photophobia, no abdominal pain, no nausea, no vomiting, no stridor, no cough, no shortness of breath, no URI and no wheezing. Services received include medications given and tests performed. Recently, medical care has been given at this facility.     Review of patient's allergies indicates:   Allergen Reactions    Lisinopril     Opioids - morphine analogues Other (See Comments)     Increase heart rate    Penicillins      Other reaction(s): short of breath, rash    Penicillins Hives    Toradol [ketorolac]      asprin    Opioids - morphine analogues Palpitations     Past Medical History:   Diagnosis Date    Coronary artery disease     DM (diabetes mellitus)     Hypertension     NSTEMI (non-ST elevated myocardial infarction)      Past Surgical History:   Procedure Laterality Date    ANGIOGRAPHY      CHOLECYSTECTOMY      INTRAVASCULAR ULTRASOUND, CORONARY N/A 1/27/2023    Procedure: Ultrasound-coronary;  Surgeon: Evan Trejo MD;  Location: [facility] CATH LAB;  Service: Cardiology;  Laterality: N/A;    LEFT HEART  CATHETERIZATION N/A 2023    Procedure: Left heart cath;  Surgeon: Evan Trejo MD;  Location: [facility] CATH LAB;  Service: Cardiology;  Laterality: N/A;    STENT, DRUG ELUTING, SINGLE VESSEL, CORONARY  2023    Procedure: Stent, Drug Eluting, Single Vessel, Coronary;  Surgeon: Evan Trejo MD;  Location: [facility] CATH LAB;  Service: Cardiology;;     Family History   Problem Relation Name Age of Onset    Diabetes Mother      Heart disease Mother      Diabetes Father Mata Krishnamurthy     Hypertension Father Mata Krishnamurthy     Heart disease Father Mata Krishnamurthy     Cancer Father Mata Krishnamurthy     Febrile seizures Father Mata Krishnamurthy      Social History     Tobacco Use    Smoking status: Former     Current packs/day: 0.00     Types: Cigarettes     Quit date: 2023     Years since quittin.2    Smokeless tobacco: Never   Substance Use Topics    Alcohol use: Not Currently    Drug use: Never     Review of Systems   Constitutional:  Negative for chills, fatigue and fever.   Eyes:  Negative for photophobia and visual disturbance.   Respiratory:  Negative for cough, shortness of breath, wheezing and stridor.    Cardiovascular:  Negative for chest pain, palpitations and leg swelling.   Gastrointestinal:  Negative for abdominal pain, nausea and vomiting.   Skin:  Positive for color change. Negative for wound.   All other systems reviewed and are negative.      Physical Exam     Initial Vitals [24]   BP Pulse Resp Temp SpO2   126/85 78 18 98.6 °F (37 °C) 96 %      MAP       --         Physical Exam    Nursing note and vitals reviewed.  Constitutional: She appears well-developed and well-nourished. She is not diaphoretic. She is cooperative.  Non-toxic appearance. No distress.   HENT:   Head: Normocephalic and atraumatic.   Nose: Nose normal.   Eyes: Conjunctivae and EOM are normal. Pupils are equal, round, and reactive to light.   Neck: Neck supple.   Normal range of motion.  Cardiovascular:   Normal rate, regular rhythm, normal heart sounds, intact distal pulses and normal pulses.           Pulmonary/Chest: Effort normal. No tachypnea and no bradypnea. No respiratory distress. She exhibits no tenderness.   Abdominal: Abdomen is soft. Bowel sounds are normal. She exhibits no distension. There is no abdominal tenderness.   Musculoskeletal:         General: Normal range of motion.      Cervical back: Normal range of motion and neck supple.     Neurological: She is alert and oriented to person, place, and time. She has normal strength. No sensory deficit. GCS score is 15. GCS eye subscore is 4. GCS verbal subscore is 5. GCS motor subscore is 6.   Skin: Skin is warm and dry. Capillary refill takes less than 2 seconds.        Small area of redness, tenderness, and mild swelling to outlined area. No fluctuance or wounds on exam. Small pustule noted to center of area. Exam performed with [place]EN Park as chaperone. Patient tolerated well.    Psychiatric: She has a normal mood and affect. Thought content normal.         ED Course   Procedures  Labs Reviewed - No data to display       Imaging Results    None          Medications - No data to display  Medical Decision Making  Patient awake, alert, has non-labored breathing, and follows commands appropriately. C/o redness, swelling, and tenderness to R groin. Symptoms began two days ago. Afebrile. NAD Noted.             Differential Diagnosis: Cellulitis, Skin Infection, Abscess     Amount and/or Complexity of Data Reviewed  Discussion of management or test interpretation with external provider(s): After evaluation of patient, area is consistent with cellulitis. I & D not indicated at the time, area is small and is not fluctuant. Will discharge home with antibiotics. Also, refilled patient's Gabapentin and Plavix. Discussed plan of care and interventions with patient. Agreed to and aware of plan of care. Comfortable being discharged home. Patient discharged home. Patient  denies new or additional complaints; no further tests indicated at this time. Verbalized understanding of instructions. No emergent or apparent distress noted prior to discharge. To follow up with PCP in 1 week as needed. Strict ER return precautions given.       Risk  OTC drugs.  Prescription drug management.               ED Course as of 07/22/24 2115 Mon Jul 22, 2024 2108 Chart review reveals patient seen at [facility] on yesterday for hyperglycemia. Discharged home with medication for DM, ointment for hordeolum, and yeast in urine.  [JA]   2111 Patient requesting refill of Plavix and Gabapentin. States she sees a new MD on 08/07/24.  [JA]      ED Course User Index  [JA] Gina Reilly NP                           Clinical Impression:  Final diagnoses:  [L03.314] Cellulitis of groin (Primary)  [Z76.0] Medication refill          ED Disposition Condition    Discharge Stable          ED Prescriptions       Medication Sig Dispense Start Date End Date Auth. Provider    sulfamethoxazole-trimethoprim 800-160mg (BACTRIM DS) 800-160 mg Tab Take 1 tablet by mouth 2 (two) times daily. for 7 days 14 tablet 7/22/2024 7/29/2024 Gina Reilly NP    mupirocin (BACTROBAN) 2 % ointment Apply topically 3 (three) times daily. 15 g 7/22/2024 -- Gian Reilly NP    clopidogreL (PLAVIX) 75 mg tablet Take 1 tablet (75 mg total) by mouth once daily. 30 tablet 7/22/2024 8/21/2024 Gina Reilly NP    gabapentin (NEURONTIN) 300 MG capsule Take 1 capsule (300 mg total) by mouth 3 (three) times daily. 90 capsule 7/22/2024 8/21/2024 Gina Reilly, HARISH          Follow-up Information       Follow up With Specialties Details Why Contact Info    PCP  Call in 1 week As needed, If symptoms worsen     [facility] - Emergency Dept Emergency Medicine Go to  If symptoms worsen, As needed [id number]  [facility] [phone]             Gina Reilly NP  07/22/24  2120

## 2024-07-24 LAB — POCT GLUCOSE: 343 MG/DL (ref 70–110)

## 2024-09-05 ENCOUNTER — OFFICE VISIT (OUTPATIENT)
Dept: FAMILY MEDICINE | Facility: CLINIC | Age: 49
End: 2024-09-05
Payer: MEDICAID

## 2024-09-05 VITALS
HEIGHT: 66 IN | HEART RATE: 65 BPM | BODY MASS INDEX: 39.19 KG/M2 | OXYGEN SATURATION: 97 % | DIASTOLIC BLOOD PRESSURE: 79 MMHG | TEMPERATURE: 98 F | RESPIRATION RATE: 18 BRPM | WEIGHT: 243.88 LBS | SYSTOLIC BLOOD PRESSURE: 134 MMHG

## 2024-09-05 DIAGNOSIS — M54.41 CHRONIC BILATERAL LOW BACK PAIN WITH BILATERAL SCIATICA: ICD-10-CM

## 2024-09-05 DIAGNOSIS — Z12.31 ENCOUNTER FOR SCREENING MAMMOGRAM FOR MALIGNANT NEOPLASM OF BREAST: ICD-10-CM

## 2024-09-05 DIAGNOSIS — E55.9 VITAMIN D DEFICIENCY: ICD-10-CM

## 2024-09-05 DIAGNOSIS — G89.29 CHRONIC BILATERAL LOW BACK PAIN WITH BILATERAL SCIATICA: ICD-10-CM

## 2024-09-05 DIAGNOSIS — Z12.11 ENCOUNTER FOR SCREENING FOR MALIGNANT NEOPLASM OF COLON: ICD-10-CM

## 2024-09-05 DIAGNOSIS — Z00.00 ENCOUNTER FOR WELLNESS EXAMINATION: Primary | ICD-10-CM

## 2024-09-05 DIAGNOSIS — E78.2 MIXED HYPERLIPIDEMIA: ICD-10-CM

## 2024-09-05 DIAGNOSIS — E87.5 HYPERKALEMIA: ICD-10-CM

## 2024-09-05 DIAGNOSIS — M54.42 CHRONIC BILATERAL LOW BACK PAIN WITH BILATERAL SCIATICA: ICD-10-CM

## 2024-09-05 DIAGNOSIS — I10 PRIMARY HYPERTENSION: ICD-10-CM

## 2024-09-05 DIAGNOSIS — E11.9 TYPE 2 DIABETES MELLITUS WITHOUT COMPLICATION, WITHOUT LONG-TERM CURRENT USE OF INSULIN: ICD-10-CM

## 2024-09-05 LAB
25(OH)D3+25(OH)D2 SERPL-MCNC: 25 NG/ML (ref 30–80)
ALBUMIN SERPL-MCNC: 3.5 G/DL (ref 3.5–5)
ALBUMIN/GLOB SERPL: 1.1 RATIO (ref 1.1–2)
ALP SERPL-CCNC: 142 UNIT/L (ref 40–150)
ALT SERPL-CCNC: 11 UNIT/L (ref 0–55)
ANION GAP SERPL CALC-SCNC: 6 MEQ/L
AST SERPL-CCNC: 10 UNIT/L (ref 5–34)
BACTERIA #/AREA URNS AUTO: ABNORMAL /HPF
BASOPHILS # BLD AUTO: 0.05 X10(3)/MCL
BASOPHILS NFR BLD AUTO: 0.5 %
BILIRUB SERPL-MCNC: 0.2 MG/DL
BILIRUB UR QL STRIP.AUTO: NEGATIVE
BUN SERPL-MCNC: 14.7 MG/DL (ref 7–18.7)
CALCIUM SERPL-MCNC: 9.8 MG/DL (ref 8.4–10.2)
CHLORIDE SERPL-SCNC: 109 MMOL/L (ref 98–107)
CHOLEST SERPL-MCNC: 117 MG/DL
CHOLEST/HDLC SERPL: 3 {RATIO} (ref 0–5)
CLARITY UR: CLEAR
CO2 SERPL-SCNC: 24 MMOL/L (ref 22–29)
COLOR UR AUTO: ABNORMAL
CREAT SERPL-MCNC: 0.83 MG/DL (ref 0.55–1.02)
CREAT/UREA NIT SERPL: 18
EOSINOPHIL # BLD AUTO: 0.22 X10(3)/MCL (ref 0–0.9)
EOSINOPHIL NFR BLD AUTO: 2.3 %
ERYTHROCYTE [DISTWIDTH] IN BLOOD BY AUTOMATED COUNT: 14.3 % (ref 11.5–17)
EST. AVERAGE GLUCOSE BLD GHB EST-MCNC: 191.5 MG/DL
GFR SERPLBLD CREATININE-BSD FMLA CKD-EPI: >60 ML/MIN/1.73/M2
GLOBULIN SER-MCNC: 3.3 GM/DL (ref 2.4–3.5)
GLUCOSE SERPL-MCNC: 188 MG/DL (ref 74–100)
GLUCOSE UR QL STRIP: ABNORMAL
HBA1C MFR BLD: 8.3 %
HCT VFR BLD AUTO: 43.1 % (ref 37–47)
HDLC SERPL-MCNC: 34 MG/DL (ref 35–60)
HGB BLD-MCNC: 14 G/DL (ref 12–16)
HGB UR QL STRIP: NEGATIVE
HYALINE CASTS #/AREA URNS LPF: ABNORMAL /LPF
IMM GRANULOCYTES # BLD AUTO: 0.03 X10(3)/MCL (ref 0–0.04)
IMM GRANULOCYTES NFR BLD AUTO: 0.3 %
KETONES UR QL STRIP: NEGATIVE
LDLC SERPL CALC-MCNC: 48 MG/DL (ref 50–140)
LEUKOCYTE ESTERASE UR QL STRIP: NEGATIVE
LYMPHOCYTES # BLD AUTO: 2.31 X10(3)/MCL (ref 0.6–4.6)
LYMPHOCYTES NFR BLD AUTO: 24.2 %
MCH RBC QN AUTO: 31.3 PG (ref 27–31)
MCHC RBC AUTO-ENTMCNC: 32.5 G/DL (ref 33–36)
MCV RBC AUTO: 96.2 FL (ref 80–94)
MONOCYTES # BLD AUTO: 0.54 X10(3)/MCL (ref 0.1–1.3)
MONOCYTES NFR BLD AUTO: 5.7 %
MUCOUS THREADS URNS QL MICRO: ABNORMAL /LPF
NEUTROPHILS # BLD AUTO: 6.4 X10(3)/MCL (ref 2.1–9.2)
NEUTROPHILS NFR BLD AUTO: 67 %
NITRITE UR QL STRIP: NEGATIVE
NRBC BLD AUTO-RTO: 0 %
PH UR STRIP: 5.5 [PH]
PLATELET # BLD AUTO: 225 X10(3)/MCL (ref 130–400)
PMV BLD AUTO: 10.5 FL (ref 7.4–10.4)
POTASSIUM SERPL-SCNC: 5.2 MMOL/L (ref 3.5–5.1)
PROT SERPL-MCNC: 6.8 GM/DL (ref 6.4–8.3)
PROT UR QL STRIP: ABNORMAL
RBC # BLD AUTO: 4.48 X10(6)/MCL (ref 4.2–5.4)
RBC #/AREA URNS AUTO: ABNORMAL /HPF
SODIUM SERPL-SCNC: 139 MMOL/L (ref 136–145)
SP GR UR STRIP.AUTO: 1.03 (ref 1–1.03)
SQUAMOUS #/AREA URNS LPF: ABNORMAL /HPF
T4 FREE SERPL-MCNC: 0.77 NG/DL (ref 0.7–1.48)
TRIGL SERPL-MCNC: 176 MG/DL (ref 37–140)
TSH SERPL-ACNC: 2.52 UIU/ML (ref 0.35–4.94)
URATE CRY UR QL COMP ASSIST: ABNORMAL
UROBILINOGEN UR STRIP-ACNC: NORMAL
VLDLC SERPL CALC-MCNC: 35 MG/DL
WBC # BLD AUTO: 9.55 X10(3)/MCL (ref 4.5–11.5)
WBC #/AREA URNS AUTO: ABNORMAL /HPF

## 2024-09-05 PROCEDURE — 80053 COMPREHEN METABOLIC PANEL: CPT

## 2024-09-05 PROCEDURE — 83036 HEMOGLOBIN GLYCOSYLATED A1C: CPT

## 2024-09-05 PROCEDURE — 82306 VITAMIN D 25 HYDROXY: CPT

## 2024-09-05 PROCEDURE — 84439 ASSAY OF FREE THYROXINE: CPT

## 2024-09-05 PROCEDURE — 36415 COLL VENOUS BLD VENIPUNCTURE: CPT

## 2024-09-05 PROCEDURE — 84443 ASSAY THYROID STIM HORMONE: CPT

## 2024-09-05 PROCEDURE — 80061 LIPID PANEL: CPT

## 2024-09-05 PROCEDURE — 85025 COMPLETE CBC W/AUTO DIFF WBC: CPT

## 2024-09-05 PROCEDURE — 81001 URINALYSIS AUTO W/SCOPE: CPT

## 2024-09-05 PROCEDURE — 99214 OFFICE O/P EST MOD 30 MIN: CPT | Mod: PBBFAC,PN

## 2024-09-05 RX ORDER — INSULIN PUMP SYRINGE, 3 ML
EACH MISCELLANEOUS
Qty: 1 EACH | Refills: 0 | Status: SHIPPED | OUTPATIENT
Start: 2024-09-05 | End: 2025-09-05

## 2024-09-05 RX ORDER — LANCETS
EACH MISCELLANEOUS
Qty: 200 EACH | Refills: 2 | Status: SHIPPED | OUTPATIENT
Start: 2024-09-05

## 2024-09-05 RX ORDER — GABAPENTIN 300 MG/1
300 CAPSULE ORAL 3 TIMES DAILY
Qty: 90 CAPSULE | Refills: 0 | Status: SHIPPED | OUTPATIENT
Start: 2024-09-05 | End: 2024-10-05

## 2024-09-06 ENCOUNTER — PATIENT MESSAGE (OUTPATIENT)
Dept: FAMILY MEDICINE | Facility: CLINIC | Age: 49
End: 2024-09-06
Payer: MEDICAID

## 2024-09-06 PROBLEM — E11.9 TYPE 2 DIABETES MELLITUS WITHOUT COMPLICATION, WITHOUT LONG-TERM CURRENT USE OF INSULIN: Status: ACTIVE | Noted: 2023-03-21

## 2024-09-06 PROBLEM — M54.42 CHRONIC BILATERAL LOW BACK PAIN WITH BILATERAL SCIATICA: Status: ACTIVE | Noted: 2024-09-06

## 2024-09-06 PROBLEM — M54.41 CHRONIC BILATERAL LOW BACK PAIN WITH BILATERAL SCIATICA: Status: ACTIVE | Noted: 2024-09-06

## 2024-09-06 PROBLEM — G89.29 CHRONIC BILATERAL LOW BACK PAIN WITH BILATERAL SCIATICA: Status: ACTIVE | Noted: 2024-09-06

## 2024-09-06 RX ORDER — ERGOCALCIFEROL 1.25 MG/1
50000 CAPSULE ORAL
Qty: 12 CAPSULE | Refills: 0 | Status: SHIPPED | OUTPATIENT
Start: 2024-09-06 | End: 2024-11-23

## 2024-09-06 RX ORDER — METFORMIN HYDROCHLORIDE 500 MG/1
1000 TABLET ORAL 2 TIMES DAILY WITH MEALS
Qty: 360 TABLET | Refills: 3 | Status: SHIPPED | OUTPATIENT
Start: 2024-09-06 | End: 2025-09-06

## 2024-09-06 RX ORDER — AMLODIPINE BESYLATE 10 MG/1
10 TABLET ORAL NIGHTLY
Qty: 90 TABLET | Refills: 3 | Status: SHIPPED | OUTPATIENT
Start: 2024-09-06 | End: 2025-09-06

## 2024-09-06 NOTE — PROGRESS NOTES
Patient Name: Serge Krishnamurthy     : 1975    MRN: [id number]     Subjective:     Patient ID: Serge Krishnamurthy is a 48 y.o. female.    Chief Complaint:   Chief Complaint   Patient presents with    Establish Care     New patient. Establish care. States has bulging disc to back and can't walk/stand for long periods of time. Requesting refills on Gabapentin.         HPI: 2024:   Cervical Cancer Screening: Due, offered  Breast Cancer Screening: ordered  Colon Cancer Screening: ordered    Eye/Dental Screening: resource list offered, recommended biannual cleanings and exams with dentist  Wellness Screenin2024 , Wellness labs (CBC, CMP, A1c, FLP, TSH, Free T4) ordered for patient today.     External providers: cardiology     Patient  has a past medical history of Coronary artery disease, DM (diabetes mellitus), Hypertension, and NSTEMI (non-ST elevated myocardial infarction). They were not recently managed by a Primary Care Provider for these conditions. Was recently started on metformin by emergency department where she states she was told she was diabetic, has been taking metformin 1000 mg b.i.d. with no complications.      Patient today denies chest pain, palpitations, and shortness of breath.  Patient denies fever, night sweats, chills, nausea, vomiting, diarrhea, constipation, weight loss, and changes in appetite.      Patient does have new complaint of chronic low back pain. Patient denies numbness/tingling which does not radiate to legs.   She reports that the pain has been present for several years.  She reports pain in lower back (aching and sharp in character; 8/10 in severity) and stiffness in lower back. Symptoms are relieved by change in body position, heat, ice, rest, and stretching.  The back problem is not work related.  She denies fever, bladder or bowel incontinence, or weakness to the hips or legs.         ROS:      12 point review of systems conducted, negative except as stated in the  history of present illness. See HPI for details.    History:     Past Medical History:   Diagnosis Date    Coronary artery disease     DM (diabetes mellitus)     Hypertension     NSTEMI (non-ST elevated myocardial infarction)         Past Surgical History:   Procedure Laterality Date    ANGIOGRAPHY      CHOLECYSTECTOMY      INTRAVASCULAR ULTRASOUND, CORONARY N/A 1/27/2023    Procedure: Ultrasound-coronary;  Surgeon: Evan Trejo MD;  Location: [facility] CATH LAB;  Service: Cardiology;  Laterality: N/A;    LEFT HEART CATHETERIZATION N/A 1/27/2023    Procedure: Left heart cath;  Surgeon: Evan Trejo MD;  Location: [facility] CATH LAB;  Service: Cardiology;  Laterality: N/A;    STENT, DRUG ELUTING, SINGLE VESSEL, CORONARY  1/27/2023    Procedure: Stent, Drug Eluting, Single Vessel, Coronary;  Surgeon: Evan Trejo MD;  Location: [facility] CATH LAB;  Service: Cardiology;;       Family History   Problem Relation Name Age of Onset    Diabetes Mother      Heart disease Mother      Diabetes Father Mata Bernardcandy     Hypertension Father Mata Bernarduiarnold     Heart disease Father Mata Stauffervivian     Cancer Father Mata Bernardbeckaarnold     Febrile seizures Father Mata Bernardcandy         Social History     Tobacco Use    Smoking status: Every Day     Current packs/day: 0.50     Average packs/day: 0.5 packs/day for 0.7 years (0.3 ttl pk-yrs)     Types: Cigarettes     Start date: 1/1/2024     Last attempt to quit: 4/12/2023    Smokeless tobacco: Never   Substance and Sexual Activity    Alcohol use: Not Currently    Drug use: Not Currently    Sexual activity: Yes     Partners: Male       Current Outpatient Medications   Medication Instructions    amLODIPine (NORVASC) 10 mg, Oral, Nightly, For High Blood Pressure    aspirin (ECOTRIN) 81 mg, Oral, Daily    atorvastatin (LIPITOR) 40 mg, Oral, Daily, For High Cholesterol    blood sugar diagnostic Strp To check BG two times daily, to use with insurance preferred meter    blood-glucose meter  "kit To check BG two times daily, to use with insurance preferred meter    clopidogreL (PLAVIX) 75 mg, Oral, Daily    ergocalciferol (ERGOCALCIFEROL) 50,000 Units, Oral, Every 7 days    erythromycin (ROMYCIN) ophthalmic ointment Place a 1/2 inch ribbon of ointment into the lower eyelid.    furosemide (LASIX) 40 mg, Oral, Daily    gabapentin (NEURONTIN) 300 mg, Oral, 3 times daily    lancets Misc To check BG two times daily, to use with insurance preferred meter    losartan (COZAAR) 100 mg, Daily    metFORMIN (GLUCOPHAGE) 1,000 mg, Oral, 2 times daily with meals    metoprolol succinate (TOPROL-XL) 50 mg, Oral, Daily    nitroGLYCERIN (NITROSTAT) 0.4 mg, Sublingual, Every 5 min PRN        Review of patient's allergies indicates:   Allergen Reactions    Lisinopril     Opioids - morphine analogues Other (See Comments)     Increase heart rate    Penicillins      Other reaction(s): short of breath, rash    Penicillins Hives    Toradol [ketorolac]      asprin    Opioids - morphine analogues Palpitations       Objective:     Visit Vitals  /79 (BP Location: Right arm, Patient Position: Sitting)   Pulse 65   Temp 98.4 °F (36.9 °C) (Oral)   Resp 18   Ht 5' 6" (1.676 m)   Wt 110.6 kg (243 lb 14.4 oz)   LMP  (LMP Unknown)   SpO2 97%   BMI 39.37 kg/m²       Physical Examination:     Physical Exam  Constitutional:       General: She is not in acute distress.     Appearance: Normal appearance. She is not ill-appearing.   Cardiovascular:      Rate and Rhythm: Normal rate and regular rhythm.      Heart sounds: Normal heart sounds.   Pulmonary:      Effort: Pulmonary effort is normal. No respiratory distress.      Breath sounds: Normal breath sounds.   Musculoskeletal:      Cervical back: Normal range of motion.      Lumbar back: Decreased range of motion. Negative right straight leg raise test and negative left straight leg raise test.   Skin:     General: Skin is warm and dry.   Neurological:      Mental Status: She is alert and " oriented to person, place, and time.   Psychiatric:         Mood and Affect: Mood normal.         Behavior: Behavior normal.       Protective Sensation (w/ 10 gram monofilament):  Right: Intact  Left: Intact    Visual Inspection:  Normal -  Bilateral    Pedal Pulses:   Right: Present  Left: Present    Posterior Tibialis Pulses:   Right:Present  Left: Present    Assessment:          ICD-10-CM ICD-9-CM   1. Encounter for wellness examination  Z00.00 V70.0   2. Encounter for screening mammogram for malignant neoplasm of breast  Z12.31 V76.12   3. Encounter for screening for malignant neoplasm of colon  Z12.11 V76.51   4. Type 2 diabetes mellitus without complication, without long-term current use of insulin  E11.9 250.00   5. Chronic bilateral low back pain with bilateral sciatica  M54.42 724.2    M54.41 724.3    G89.29 338.29   6. Hyperkalemia  E87.5 276.7   7. Primary hypertension  I10 401.9   8. Mixed hyperlipidemia  E78.2 272.2   9. Vitamin D deficiency  E55.9 268.9        Plan:     1. Encounter for wellness examination  -     TSH  -     T4, Free  -     Hemoglobin A1C  -     Lipid Panel  -     CBC Auto Differential  -     Comprehensive Metabolic Panel  -     Vitamin D  -     Urinalysis, Reflex to Urine Culture    2. Encounter for screening mammogram for malignant neoplasm of breast  -     Mammo Digital Screening Bilat; Future; Expected date: 09/05/2024    3. Encounter for screening for malignant neoplasm of colon  -     Cologuard Screening (Multitarget Stool DNA); Future; Expected date: 09/05/2024    4. Type 2 diabetes mellitus without complication, without long-term current use of insulin  Overview:  Encouraged ACE/ARB/Statin according to guidelines.  Follow ADA Diet. Avoid soda, simple sweets, and limit rice/pasta/breads/starches.  Maintain healthy weight with goal BMI <30. Exercise 5 times per week for 30 minutes per day.  Stressed importance of daily foot exams.  Stressed importance of annual dilated eye  exam.  Last foot exam: 9/5/24  Last eye exam: nov  Last micro albumin: nov      Orders:  -     blood-glucose meter kit; To check BG two times daily, to use with insurance preferred meter  Dispense: 1 each; Refill: 0  -     lancets Misc; To check BG two times daily, to use with insurance preferred meter  Dispense: 200 each; Refill: 2  -     blood sugar diagnostic Strp; To check BG two times daily, to use with insurance preferred meter  Dispense: 200 each; Refill: 2  -     metFORMIN (GLUCOPHAGE) 500 MG tablet; Take 2 tablets (1,000 mg total) by mouth 2 (two) times daily with meals.  Dispense: 360 tablet; Refill: 3  -     Microalbumin/Creatinine Ratio, Urine; Future; Expected date: 09/06/2024  -     Diabetic Eye Screening Photo; Future; Expected date: 09/06/2024    5. Chronic bilateral low back pain with bilateral sciatica  Assessment & Plan:  Lower back stretches daily.  Avoid strenuous lifting, use proper body mechanics.  Heating pad, Ice pack, Biofreeze or Epsom salt baths as needed.  Exercise to strengthen core muscles to support your back.  PT Referral given.        Orders:  -     gabapentin (NEURONTIN) 300 MG capsule; Take 1 capsule (300 mg total) by mouth 3 (three) times daily.  Dispense: 90 capsule; Refill: 0  -     Ambulatory referral/consult to Physical/Occupational Therapy; Future; Expected date: 09/05/2024    6. Hyperkalemia  Comments:  recheck level  Orders:  -     Basic Metabolic Panel; Future; Expected date: 09/06/2024    7. Primary hypertension  Overview:  Continue medications as prescribed  Read positive daily meditations, avoid negative media, set healthy boundaries.  Exercise daily, keep consistent sleep pattern, eat a healthy diet.  Establish good social support, make changes to reduce stress.  Do not drink alcohol or use illicit drugs.  Reports any symptoms of suicidal/homicidal ideations or self harm immediately, go to nearest emergency room.      Orders:  -     amLODIPine (NORVASC) 10 MG tablet;  Take 1 tablet (10 mg total) by mouth every evening. For High Blood Pressure  Dispense: 90 tablet; Refill: 3    8. Mixed hyperlipidemia  Overview:   Follow a low cholesterol, low saturated fat diet with less than 200 mg of cholesterol a day.   Avoid fried foods and high saturated fats (boudreaux, sausage, cookies, cakes, chips, cheese, whole milk, butter, mayonnaise, creamy dressings, gravy, stew, gumbo, boudin, cracklins and cream sauces).  Add flax seed or fish oil supplements to diet.   Increase dietary fiber.   Regular exercise improves cholesterol levels.  Physical activity 5 times a week for 30 minutes per day (or 150 minutes per week).   Stressed importance of dietary modifications.      Assessment & Plan:  Lipid panel today      9. Vitamin D deficiency  Assessment & Plan:  Educated on increasing foods high in Vitamin D such as fish oil, cod liver oil, salmon, milk fortified with vitamin D.  RX Vitamin D3 97484 IU weekly x 12 weeks.  Complete entire 12 weeks of Vitamin D prescription.  After completion of prescription (12 weeks/3 months), begin taking Vitamin D 2000 I.U. tablets daily (purchase over the counter).  Repeat Vitamin D level as ordered.        Other orders  -     ergocalciferol (ERGOCALCIFEROL) 50,000 unit Cap; Take 1 capsule (50,000 Units total) by mouth every 7 days. for 12 doses  Dispense: 12 capsule; Refill: 0         Follow up in about 19 days (around 9/24/2024).    Future Appointments   Date Time Provider Department Center   9/24/2024 11:00 AM Alesia Mendoza NP [facility]        Alesia Mendoza NP      This note was created with the assistance of a voice recognition software or phone dictation. There may be transcription errors as a result of using this technology however minimal. Effort has been made to assure accuracy of transcription but any obvious errors or omissions should be clarified with the author of the document

## 2024-09-06 NOTE — ASSESSMENT & PLAN NOTE
Educated on increasing foods high in Vitamin D such as fish oil, cod liver oil, salmon, milk fortified with vitamin D.  RX Vitamin D3 49052 IU weekly x 12 weeks.  Complete entire 12 weeks of Vitamin D prescription.  After completion of prescription (12 weeks/3 months), begin taking Vitamin D 2000 I.U. tablets daily (purchase over the counter).  Repeat Vitamin D level as ordered.

## 2024-09-14 ENCOUNTER — HOSPITAL ENCOUNTER (EMERGENCY)
Facility: HOSPITAL | Age: 49
Discharge: HOME | End: 2024-09-14
Attending: EMERGENCY MEDICINE
Payer: MEDICAID

## 2024-09-14 VITALS
BODY MASS INDEX: 36.96 KG/M2 | TEMPERATURE: 98 F | DIASTOLIC BLOOD PRESSURE: 87 MMHG | WEIGHT: 230 LBS | HEIGHT: 66 IN | OXYGEN SATURATION: 95 % | RESPIRATION RATE: 18 BRPM | SYSTOLIC BLOOD PRESSURE: 134 MMHG | HEART RATE: 83 BPM

## 2024-09-14 DIAGNOSIS — R05.9 COUGH: ICD-10-CM

## 2024-09-14 DIAGNOSIS — R06.2 WHEEZING: Primary | ICD-10-CM

## 2024-09-14 DIAGNOSIS — J40 BRONCHITIS: ICD-10-CM

## 2024-09-14 LAB
FLUAV AG UPPER RESP QL IA.RAPID: NOT DETECTED
FLUBV AG UPPER RESP QL IA.RAPID: NOT DETECTED
POCT GLUCOSE: 175 MG/DL (ref 70–110)
RSV A 5' UTR RNA NPH QL NAA+PROBE: NOT DETECTED
SARS-COV-2 RNA RESP QL NAA+PROBE: NOT DETECTED

## 2024-09-14 PROCEDURE — 94640 AIRWAY INHALATION TREATMENT: CPT

## 2024-09-14 PROCEDURE — 25000242 PHARM REV CODE 250 ALT 637 W/ HCPCS: Performed by: EMERGENCY MEDICINE

## 2024-09-14 PROCEDURE — 63600175 PHARM REV CODE 636 W HCPCS: Performed by: EMERGENCY MEDICINE

## 2024-09-14 PROCEDURE — 99284 EMERGENCY DEPT VISIT MOD MDM: CPT | Mod: 25

## 2024-09-14 PROCEDURE — 99900031 HC PATIENT EDUCATION (STAT)

## 2024-09-14 PROCEDURE — 0241U COVID/RSV/FLU A&B PCR: CPT | Performed by: EMERGENCY MEDICINE

## 2024-09-14 RX ORDER — LEVALBUTEROL INHALATION SOLUTION 1.25 MG/3ML
1.25 SOLUTION RESPIRATORY (INHALATION)
Status: COMPLETED | OUTPATIENT
Start: 2024-09-14 | End: 2024-09-14

## 2024-09-14 RX ORDER — LEVALBUTEROL TARTRATE 45 UG/1
1-2 AEROSOL, METERED ORAL EVERY 4 HOURS PRN
Qty: 15 G | Refills: 0 | Status: SHIPPED | OUTPATIENT
Start: 2024-09-14 | End: 2025-09-14

## 2024-09-14 RX ORDER — IPRATROPIUM BROMIDE 21 UG/1
2 SPRAY, METERED NASAL 3 TIMES DAILY
Qty: 30 ML | Refills: 0 | Status: SHIPPED | OUTPATIENT
Start: 2024-09-14

## 2024-09-14 RX ORDER — LEVALBUTEROL INHALATION SOLUTION 1.25 MG/3ML
1 SOLUTION RESPIRATORY (INHALATION) EVERY 4 HOURS PRN
Qty: 30 ML | Refills: 0 | Status: SHIPPED | OUTPATIENT
Start: 2024-09-14

## 2024-09-14 RX ORDER — PREDNISONE 20 MG/1
40 TABLET ORAL DAILY
Qty: 6 TABLET | Refills: 0 | Status: SHIPPED | OUTPATIENT
Start: 2024-09-14 | End: 2024-09-17

## 2024-09-14 RX ORDER — PREDNISONE 20 MG/1
40 TABLET ORAL
Status: COMPLETED | OUTPATIENT
Start: 2024-09-14 | End: 2024-09-14

## 2024-09-14 RX ADMIN — PREDNISONE 40 MG: 20 TABLET ORAL at 07:09

## 2024-09-14 RX ADMIN — LEVALBUTEROL HYDROCHLORIDE 1.25 MG: 1.25 SOLUTION RESPIRATORY (INHALATION) at 07:09

## 2024-09-14 NOTE — Clinical Note
"Serge"Fabiano Krishnamurthy was seen and treated in our emergency department on 9/14/2024.  She may return to work on 09/16/2024.       If you have any questions or concerns, please don't hesitate to call.      Melissa Mariscal MD"

## 2024-09-14 NOTE — ED PROVIDER NOTES
Encounter Date: 9/14/2024       History     Chief Complaint   Patient presents with    Cough     Reports cough and congestion; believes she started wheezing over the past few days;      Patient is a 48-year-old female presenting with cough wheezing and shortness of breath.  Symptoms started 5 days ago with mild upper respiratory congestion in runny nose.  She is a regular smoker.  She does have a history of diabetes hypertension and an NSTEMI.  She denies any chest pain abdominal pain nausea vomiting rashes.  She does have some chronic leg swelling in her left lower extremity that she is following with her doctor with.  She denies any pain in that extremity.  Cough is nonproductive.  She is not running fevers.  She does have a family member of toddler age that recently had an upper respiratory infection and was diagnosed with bronchitis.  She herself does not have a prior history of lung illness but does get bronchitis about once every couple of years.  She states albuterol does make her heart race very quickly.        Review of patient's allergies indicates:   Allergen Reactions    Lisinopril     Opioids - morphine analogues Other (See Comments)     Increase heart rate    Penicillins      Other reaction(s): short of breath, rash    Penicillins Hives    Toradol [ketorolac]      asprin    Opioids - morphine analogues Palpitations     Past Medical History:   Diagnosis Date    Asthma     Coronary artery disease     DM (diabetes mellitus)     Hypertension     NSTEMI (non-ST elevated myocardial infarction)      Past Surgical History:   Procedure Laterality Date    ANGIOGRAPHY      CHOLECYSTECTOMY      INTRAVASCULAR ULTRASOUND, CORONARY N/A 1/27/2023    Procedure: Ultrasound-coronary;  Surgeon: Evan Trejo MD;  Location: [facility] CATH LAB;  Service: Cardiology;  Laterality: N/A;    LEFT HEART CATHETERIZATION N/A 1/27/2023    Procedure: Left heart cath;  Surgeon: Evan Trejo MD;  Location: [facility] CATH LAB;  Service:  Cardiology;  Laterality: N/A;    STENT, DRUG ELUTING, SINGLE VESSEL, CORONARY  1/27/2023    Procedure: Stent, Drug Eluting, Single Vessel, Coronary;  Surgeon: Evan Trejo MD;  Location: [facility] CATH LAB;  Service: Cardiology;;     Family History   Problem Relation Name Age of Onset    Diabetes Mother      Heart disease Mother      Diabetes Father Mata Krishnamurthy     Hypertension Father Mata Krishnamurthy     Heart disease Father Mata Krishnamurthy     Cancer Father Mata Krishnamurthy     Febrile seizures Father Mata Krishnamurthy      Social History     Tobacco Use    Smoking status: Every Day     Current packs/day: 0.50     Average packs/day: 0.5 packs/day for 0.7 years (0.4 ttl pk-yrs)     Types: Cigarettes     Start date: 1/1/2024     Last attempt to quit: 4/12/2023    Smokeless tobacco: Never   Substance Use Topics    Alcohol use: Not Currently    Drug use: Not Currently     Review of Systems   Constitutional:  Negative for fever.   HENT:  Negative for sore throat.    Respiratory:  Positive for cough, shortness of breath and wheezing. Negative for chest tightness.    Cardiovascular:  Negative for chest pain and palpitations.   Gastrointestinal:  Negative for nausea.   Genitourinary:  Negative for dysuria.   Musculoskeletal:  Negative for back pain.   Skin:  Negative for rash.   Neurological:  Negative for weakness.   Hematological:  Does not bruise/bleed easily.       Physical Exam     Initial Vitals [09/14/24 1901]   BP Pulse Resp Temp SpO2   134/87 71 18 97.9 °F (36.6 °C) (!) 93 %      MAP       --         Physical Exam    Nursing note and vitals reviewed.  Constitutional: She appears well-developed and well-nourished. No distress.   HENT:   Head: Normocephalic and atraumatic.   Neck: Neck supple.   Cardiovascular:  Normal rate, regular rhythm and normal heart sounds.           No murmur heard.  Pulmonary/Chest: No respiratory distress. She has wheezes. She has no rhonchi.   Abdominal: Abdomen is soft. Bowel sounds  are normal. She exhibits no distension. There is no abdominal tenderness. There is no rebound and no guarding.   Musculoskeletal:         General: Normal range of motion.      Cervical back: Neck supple.      Comments: Trace edema in the RLE  +1 edema in the LLE  Peripheral pulses intact. No TTP of the calf. No color changes. No palpable cord.      Neurological: She is alert and oriented to person, place, and time.   Skin: Skin is warm and dry.   Psychiatric: She has a normal mood and affect. Thought content normal.         ED Course   Procedures  Labs Reviewed   POCT GLUCOSE - Abnormal       Result Value    POCT Glucose 175 (*)    COVID/RSV/FLU A&B PCR - Normal    Influenza A PCR Not Detected      Influenza B PCR Not Detected      Respiratory Syncytial Virus PCR Not Detected      SARS-CoV-2 PCR Not Detected      Narrative:     The Xpert Xpress SARS-CoV-2/FLU/RSV plus is a rapid, multiplexed real-time PCR test intended for the simultaneous qualitative detection and differentiation of SARS-CoV-2, Influenza A, Influenza B, and respiratory syncytial virus (RSV) viral RNA in either nasopharyngeal swab or nasal swab specimens.         POCT GLUCOSE, HAND-HELD DEVICE          Imaging Results              X-Ray Chest PA And Lateral (Final result)  Result time 09/14/24 19:34:15      Final result by Josiah Guzman MD (09/14/24 19:34:15)                   Impression:      No acute cardiopulmonary process identified.      Electronically signed by: Josiah Guzman  Date:    09/14/2024  Time:    19:34               Narrative:    EXAMINATION:  XR CHEST PA AND LATERAL    CLINICAL HISTORY:  Cough, unspecified    TECHNIQUE:  Two views    COMPARISON:  June 21, 2023.    FINDINGS:  Cardiopericardial silhouette is within normal limits.  Chronic interstitial changes of the lungs without dense focal or segmental consolidation, overt congestion, pleural effusion or pneumothorax.                        Wet Read by Melissa Mariscal MD  (09/14/24 19:26:43, [facility] - Emergency Dept, Emergency Medicine)    No significant change when compared to prior.  No acute findings.                                     Medications   levalbuterol nebulizer solution 1.25 mg (1.25 mg Nebulization Given 9/14/24 1922)   predniSONE tablet 40 mg (40 mg Oral Given 9/14/24 1945)     Medical Decision Making  Differentials considered but not limited to pharyngitis, viral illness, strep throat, flu, covid, RSV, allergies, sinusitis      Patient does have some leg swelling however this has been going on longer than the patient's current complaint. She does not have any chest pain.She does not have any calf pain and her main complaint is wheezing which is clearly auscultated on exam. This constellation of symptoms is makes the cause for complete very unlikely to be a PE.  We will treat symptomatically and re-evaluate.    Problems Addressed:  Cough: acute illness or injury  Wheezing: acute illness or injury    Amount and/or Complexity of Data Reviewed  Labs: ordered. Decision-making details documented in ED Course.  Radiology: ordered and independent interpretation performed. Decision-making details documented in ED Course.    Risk  Prescription drug management.               ED Course as of 09/14/24 2010   Sat Sep 14, 2024   1958 On re-examination patient has good air movement with no wheezing with regular respirations.  With forced inspiration and exhalation there is some faint wheezes present.  Patient reports that she is feeling much better.  Dispo pending COVID flu test [GM]   2006 Influenza A, Molecular: Not Detected [GM]   2006 Influenza B, Molecular: Not Detected [GM]   2006 RSV Ag by Molecular Method: Not Detected [GM]   2006 SARS-CoV2 (COVID-19) Qualitative PCR: Not Detected [GM]      ED Course User Index  [GM] Melissa Mariscal MD                           Clinical Impression:  Final diagnoses:  [R05.9] Cough  [R06.2] Wheezing (Primary)  [J40]  Bronchitis          ED Disposition Condition    Discharge Stable          ED Prescriptions       Medication Sig Dispense Start Date End Date Auth. Provider    predniSONE (DELTASONE) 20 MG tablet Take 2 tablets (40 mg total) by mouth once daily. for 3 days 6 tablet 9/14/2024 9/17/2024 Melissa Mariscal MD    levalbuterol (XOPENEX HFA) 45 mcg/actuation inhaler Inhale 1-2 puffs into the lungs every 4 (four) hours as needed for Wheezing. Rescue 15 g 9/14/2024 9/14/2025 Melissa Mariscal MD    levalbuterol (XOPENEX) 1.25 mg/3 mL nebulizer solution Take 3 mLs (1.25 mg total) by nebulization every 4 (four) hours as needed for Wheezing. Rescue 30 mL 9/14/2024 -- Melissa Mariscal MD    ipratropium (ATROVENT) 21 mcg (0.03 %) nasal spray 2 sprays by Each Nostril route 3 (three) times daily. 30 mL 9/14/2024 -- Melissa Mariscal MD          Follow-up Information       Follow up With Specialties Details Why Contact Info    Emanuel Kilpatrick MD Family Medicine  As needed, If symptoms worsen, return to the  [place] [id number]  [phone]               Melissa Mariscal MD  09/14/24 2010

## 2024-09-15 NOTE — DISCHARGE INSTRUCTIONS
Your COVID and Flu tests were negative. You may use the nebulizer machine at home for treatments and save the inhaler for travel or for when you are away from the home. If you take one, you do not need to take the other. You may start the steroids tomorrow.

## 2024-10-02 ENCOUNTER — TELEPHONE (OUTPATIENT)
Dept: FAMILY MEDICINE | Facility: CLINIC | Age: 49
End: 2024-10-02
Payer: MEDICAID

## 2024-10-02 NOTE — TELEPHONE ENCOUNTER
----- Message from Adelaida sent at 10/2/2024 11:36 AM CDT -----  .Who Called: Serge Krishnamurthy    Refill or New Rx:Refill  RX Name and Strength:metFORMIN (GLUCOPHAGE) 500 MG tablet  How is the patient currently taking it? (ex. 1XDay):2x  Is this a 30 day or 90 day RX:30  Local or Mail Order:local   List of preferred pharmacies on file (remove unneeded): [facility]@  If different Pharmacy is requested, enter Pharmacy information here including location and phone number: [facility] in [place]    Ordering Provider:      Preferred Method of Contact: Phone Call  Patient's Preferred Phone Number on File: [phone]  Best Call Back Number, if different:  Additional Information:

## 2024-10-08 ENCOUNTER — OFFICE VISIT (OUTPATIENT)
Dept: FAMILY MEDICINE | Facility: CLINIC | Age: 49
End: 2024-10-08
Payer: MEDICAID

## 2024-10-08 VITALS
RESPIRATION RATE: 18 BRPM | WEIGHT: 241.5 LBS | DIASTOLIC BLOOD PRESSURE: 91 MMHG | HEIGHT: 66 IN | SYSTOLIC BLOOD PRESSURE: 143 MMHG | BODY MASS INDEX: 38.81 KG/M2 | OXYGEN SATURATION: 96 % | TEMPERATURE: 98 F | HEART RATE: 71 BPM

## 2024-10-08 DIAGNOSIS — E11.9 TYPE 2 DIABETES MELLITUS WITHOUT COMPLICATION, WITHOUT LONG-TERM CURRENT USE OF INSULIN: ICD-10-CM

## 2024-10-08 DIAGNOSIS — G89.29 CHRONIC BILATERAL LOW BACK PAIN WITH BILATERAL SCIATICA: ICD-10-CM

## 2024-10-08 DIAGNOSIS — M54.41 CHRONIC BILATERAL LOW BACK PAIN WITH BILATERAL SCIATICA: ICD-10-CM

## 2024-10-08 DIAGNOSIS — R09.81 NASAL CONGESTION: ICD-10-CM

## 2024-10-08 DIAGNOSIS — I10 PRIMARY HYPERTENSION: Primary | ICD-10-CM

## 2024-10-08 DIAGNOSIS — M54.42 CHRONIC BILATERAL LOW BACK PAIN WITH BILATERAL SCIATICA: ICD-10-CM

## 2024-10-08 PROCEDURE — 4010F ACE/ARB THERAPY RXD/TAKEN: CPT | Mod: CPTII,,,

## 2024-10-08 PROCEDURE — 99214 OFFICE O/P EST MOD 30 MIN: CPT | Mod: S$PBB,,,

## 2024-10-08 PROCEDURE — 3052F HG A1C>EQUAL 8.0%<EQUAL 9.0%: CPT | Mod: CPTII,,,

## 2024-10-08 PROCEDURE — 3077F SYST BP >= 140 MM HG: CPT | Mod: CPTII,,,

## 2024-10-08 PROCEDURE — 99214 OFFICE O/P EST MOD 30 MIN: CPT | Mod: PBBFAC,PN

## 2024-10-08 PROCEDURE — 1160F RVW MEDS BY RX/DR IN RCRD: CPT | Mod: CPTII,,,

## 2024-10-08 PROCEDURE — 1159F MED LIST DOCD IN RCRD: CPT | Mod: CPTII,,,

## 2024-10-08 PROCEDURE — 3008F BODY MASS INDEX DOCD: CPT | Mod: CPTII,,,

## 2024-10-08 PROCEDURE — 3080F DIAST BP >= 90 MM HG: CPT | Mod: CPTII,,,

## 2024-10-08 RX ORDER — OLMESARTAN MEDOXOMIL 20 MG/1
20 TABLET ORAL DAILY
Qty: 90 TABLET | Refills: 0 | Status: SHIPPED | OUTPATIENT
Start: 2024-10-08 | End: 2025-01-06

## 2024-10-08 RX ORDER — GABAPENTIN 300 MG/1
300 CAPSULE ORAL 3 TIMES DAILY
Qty: 90 CAPSULE | Refills: 0 | Status: SHIPPED | OUTPATIENT
Start: 2024-10-08 | End: 2024-11-07

## 2024-10-08 RX ORDER — FLUTICASONE PROPIONATE 50 MCG
1 SPRAY, SUSPENSION (ML) NASAL DAILY
Qty: 18.2 ML | Refills: 3 | Status: SHIPPED | OUTPATIENT
Start: 2024-10-08

## 2024-10-08 RX ORDER — SEMAGLUTIDE 0.68 MG/ML
0.25 INJECTION, SOLUTION SUBCUTANEOUS
Qty: 3 ML | Refills: 1 | Status: SHIPPED | OUTPATIENT
Start: 2024-10-08 | End: 2025-02-05

## 2024-10-08 NOTE — PROGRESS NOTES
Patient Name: Serge Krishnamurthy     : 1975    MRN: [id number]     Subjective:     Patient ID: Serge Krishnamurthy is a 48 y.o. female.    Chief Complaint:   Chief Complaint   Patient presents with    Follow-up     F/u appointment. Requesting medication refills. States completed cologuard. C/o nasal congestion.         HPI: 10/08/2024: Patient presents to clinic today to review labs. Discussed labs at length with patient and all questions were answered to patients understanding. Patient has no new acute complaints today clear nasal congestion, states that she slept with fan on about 3 nights ago and has been feeling stuffy since.  Denies any systemic symptoms.  He is not using any over-the-counter medications. Denies known personal or family history of thyroid cancer. Is amebable to start once weekly injectable for DM management. Also requesting refill of HTN medications, is established with Dr. Waldrop office but due to transportation issues has not been able to follow-up to get refills, states that her car was recently fixed and she plans to follow up with them next month. Patient denies chest pain, palpitations, and shortness of breath.  Patient denies fever, night sweats, chills, nausea, vomiting, diarrhea, constipation, weight loss, and changes in appetite.        ROS:      12 point review of systems conducted, negative except as stated in the history of present illness. See HPI for details.    History:     Past Medical History:   Diagnosis Date    Asthma     Coronary artery disease     DM (diabetes mellitus)     Hypertension     NSTEMI (non-ST elevated myocardial infarction)         Past Surgical History:   Procedure Laterality Date    ANGIOGRAPHY      CHOLECYSTECTOMY      INTRAVASCULAR ULTRASOUND, CORONARY N/A 2023    Procedure: Ultrasound-coronary;  Surgeon: Evan Trejo MD;  Location: [facility] CATH LAB;  Service: Cardiology;  Laterality: N/A;    LEFT HEART CATHETERIZATION N/A 2023    Procedure:  Left heart cath;  Surgeon: Evan Trejo MD;  Location: [facility] CATH LAB;  Service: Cardiology;  Laterality: N/A;    STENT, DRUG ELUTING, SINGLE VESSEL, CORONARY  1/27/2023    Procedure: Stent, Drug Eluting, Single Vessel, Coronary;  Surgeon: Evan Trejo MD;  Location: [facility] CATH LAB;  Service: Cardiology;;       Family History   Problem Relation Name Age of Onset    Diabetes Mother      Heart disease Mother      Diabetes Father Mata Krishnamurthy     Hypertension Father Mata Krishnamurthy     Heart disease Father Mata Krishnamurthy     Cancer Father Mata Krishnamurthy     Febrile seizures Father Mata Krishnamurthy         Social History     Tobacco Use    Smoking status: Every Day     Current packs/day: 0.50     Average packs/day: 0.5 packs/day for 0.8 years (0.4 ttl pk-yrs)     Types: Cigarettes     Start date: 1/1/2024     Last attempt to quit: 4/12/2023    Smokeless tobacco: Never   Substance and Sexual Activity    Alcohol use: Not Currently    Drug use: Not Currently    Sexual activity: Yes     Partners: Male       Current Outpatient Medications   Medication Instructions    amLODIPine (NORVASC) 10 mg, Oral, Nightly, For High Blood Pressure    aspirin (ECOTRIN) 81 mg, Oral, Daily    atorvastatin (LIPITOR) 40 mg, Oral, Daily, For High Cholesterol    blood sugar diagnostic Strp To check BG two times daily, to use with insurance preferred meter    blood-glucose meter kit To check BG two times daily, to use with insurance preferred meter    clopidogreL (PLAVIX) 75 mg, Oral, Daily    ergocalciferol (ERGOCALCIFEROL) 50,000 Units, Oral, Every 7 days    erythromycin (ROMYCIN) ophthalmic ointment Place a 1/2 inch ribbon of ointment into the lower eyelid.    fluticasone propionate (FLONASE) 50 mcg, Each Nostril, Daily    gabapentin (NEURONTIN) 300 mg, Oral, 3 times daily    ipratropium (ATROVENT) 21 mcg (0.03 %) nasal spray 2 sprays, Each Nostril, 3 times daily    lancets Misc To check BG two times daily, to use with insurance  "preferred meter    levalbuterol (XOPENEX HFA) 45 mcg/actuation inhaler 1-2 puffs, Inhalation, Every 4 hours PRN, Rescue    levalbuterol (XOPENEX) 1.25 mg, Nebulization, Every 4 hours PRN, Rescue    metFORMIN (GLUCOPHAGE) 1,000 mg, Oral, 2 times daily with meals    metoprolol succinate (TOPROL-XL) 50 mg, Oral, Daily    nitroGLYCERIN (NITROSTAT) 0.4 mg, Sublingual, Every 5 min PRN    olmesartan (BENICAR) 20 mg, Oral, Daily        Review of patient's allergies indicates:   Allergen Reactions    Lisinopril     Opioids - morphine analogues Other (See Comments)     Increase heart rate    Penicillins      Other reaction(s): short of breath, rash    Penicillins Hives    Toradol [ketorolac]      asprin    Opioids - morphine analogues Palpitations       Objective:     Visit Vitals  BP (!) 143/91 (BP Location: Left arm, Patient Position: Sitting)   Pulse 71   Temp 98 °F (36.7 °C) (Oral)   Resp 18   Ht 5' 6" (1.676 m)   Wt 109.5 kg (241 lb 8 oz)   LMP  (LMP Unknown)   SpO2 96%   BMI 38.98 kg/m²       Physical Examination:     Physical Exam  Constitutional:       General: She is not in acute distress.     Appearance: Normal appearance. She is not ill-appearing.   Cardiovascular:      Rate and Rhythm: Normal rate and regular rhythm.      Heart sounds: Normal heart sounds.   Pulmonary:      Effort: Pulmonary effort is normal. No respiratory distress.      Breath sounds: Normal breath sounds.   Musculoskeletal:      Cervical back: Normal range of motion.   Skin:     General: Skin is warm and dry.   Neurological:      Mental Status: She is alert and oriented to person, place, and time.   Psychiatric:         Mood and Affect: Mood normal.         Behavior: Behavior normal.           Assessment:          ICD-10-CM ICD-9-CM   1. Primary hypertension  I10 401.9   2. Chronic bilateral low back pain with bilateral sciatica  M54.42 724.2    M54.41 724.3    G89.29 338.29   3. Type 2 diabetes mellitus without complication, without long-term " current use of insulin  E11.9 250.00   4. Nasal congestion  R09.81 478.19        Plan:     1. Primary hypertension  Overview:  Continue medications as prescribed  Read positive daily meditations, avoid negative media, set healthy boundaries.  Exercise daily, keep consistent sleep pattern, eat a healthy diet.  Establish good social support, make changes to reduce stress.  Do not drink alcohol or use illicit drugs.  Reports any symptoms of suicidal/homicidal ideations or self harm immediately, go to nearest emergency room.      Assessment & Plan:  Continue amlodipine 10 mg nightly, olmesartan 20 mg daily    Orders:  -     olmesartan (BENICAR) 20 MG tablet; Take 1 tablet (20 mg total) by mouth once daily.  Dispense: 90 tablet; Refill: 0    2. Chronic bilateral low back pain with bilateral sciatica  -     gabapentin (NEURONTIN) 300 MG capsule; Take 1 capsule (300 mg total) by mouth 3 (three) times daily.  Dispense: 90 capsule; Refill: 0    3. Type 2 diabetes mellitus without complication, without long-term current use of insulin  Overview:  Encouraged ACE/ARB/Statin according to guidelines.  Follow ADA Diet. Avoid soda, simple sweets, and limit rice/pasta/breads/starches.  Maintain healthy weight with goal BMI <30. Exercise 5 times per week for 30 minutes per day.  Stressed importance of daily foot exams.  Stressed importance of annual dilated eye exam.        Assessment & Plan:  Continue increased metformin 1000 mg BID, add ozempic 0.25 mg weekly recheck level at NOV      4. Nasal congestion  -     fluticasone propionate (FLONASE) 50 mcg/actuation nasal spray; 1 spray (50 mcg total) by Each Nostril route once daily.  Dispense: 18.2 mL; Refill: 3         Follow up in about 3 months (around 1/8/2025) for POC A1c, DM follow-up.    Future Appointments   Date Time Provider Department Center   1/8/2025 10:00 AM Alesia Mendoza NP [facility]        Alesia Mendoza NP      This note was created with  the assistance of a voice recognition software or phone dictation. There may be transcription errors as a result of using this technology however minimal. Effort has been made to assure accuracy of transcription but any obvious errors or omissions should be clarified with the author of the document

## 2024-10-11 ENCOUNTER — NURSE TRIAGE (OUTPATIENT)
Dept: ADMINISTRATIVE | Facility: CLINIC | Age: 49
End: 2024-10-11
Payer: MEDICAID

## 2024-10-11 ENCOUNTER — TELEPHONE (OUTPATIENT)
Dept: FAMILY MEDICINE | Facility: CLINIC | Age: 49
End: 2024-10-11
Payer: MEDICAID

## 2024-10-11 NOTE — TELEPHONE ENCOUNTER
----- Message from Rajni sent at 10/11/2024  8:23 AM CDT -----  Regarding: med concern  Patient took her ozempac incorrectly and is concern that it may cause complications and would like a call back     Callback [phone]

## 2024-10-11 NOTE — TELEPHONE ENCOUNTER
WILIAM Dumas verbally made aware. Attempted to contact patient x2. No answer. Unable to leave voicemail.

## 2024-10-11 NOTE — PLAN OF CARE
[facility]    Referral source: Nurse On-Call      Discussed patient with: Nurse On Call      Reason for consult:  Medication error.  The patient accidentally took an additional dose of her Ozempic.  She is completely asymptomatic.      Medical Record Review:  I reviewed the patient's clinic note from yesterday.      Disposition recommended:  As the patient is asymptomatic, I do not feel that any further emergent evaluation or intervention is warranted.  She has been instructed to call the [facility] on-call nurse back if she develops symptoms.      Additional Recommendations:  Not applicable.

## 2024-10-11 NOTE — TELEPHONE ENCOUNTER
"Pt pcp started her on Ozempic. She took her first dose yesterday morning. She took another dose today. Dose .25 mg. Pt denies any symptoms. Care advice recommends call transferred to MD now. Ruy provider Dr Roberts notified and stated "Please advise the patient that she may develop some minor abdominal cramping or nausea. However, with no symptoms at all, there is nothing that she needs to do at this point. Asked her to call us back if she develops symptoms". Pt notified and verbalized understanding.       Dr Mendoza please call and inform pt if she needs to skip next weeks dose or if she can take her shot. Pt is awaiting a return call.    Reason for Disposition   DOUBLE DOSE (an extra dose or lesser amount) of prescription drug and NO symptoms  (Exception: A double dose of antibiotics.)    Additional Information   Negative: Intentional drug overdose and suicidal thoughts or ideas   Negative: MORE THAN A DOUBLE DOSE of a prescription or over-the-counter (OTC) drug   Negative: DOUBLE DOSE (an extra dose or lesser amount) of prescription drug and any symptoms (e.g., dizziness, nausea, pain, sleepiness)   Negative: DOUBLE DOSE (an extra dose or lesser amount) of over-the-counter (OTC) drug and any symptoms (e.g., dizziness, nausea, pain, sleepiness)   Negative: Took another person's prescription drug    Protocols used: Medication Question Call-A-OH    "

## 2024-10-18 ENCOUNTER — TELEPHONE (OUTPATIENT)
Dept: FAMILY MEDICINE | Facility: CLINIC | Age: 49
End: 2024-10-18
Payer: MEDICAID

## 2024-10-18 NOTE — TELEPHONE ENCOUNTER
LVM letting the patient know provider is out of the office. I stated that if she feels like she needs to be evaluated due to taking to much ozempic she should follow up in urgent care or ER.

## 2024-10-18 NOTE — TELEPHONE ENCOUNTER
"----- Message from Leonora sent at 10/18/2024 10:35 AM CDT -----  Who Called: Serge Krishnamurthy    Caller is requesting assistance/information from provider's office.  Preferred Method of Contact: Phone Call  Patient's Preferred Phone Number on File: [phone]   Best Call Back Number, if different:[phone]  Additional Information: medical advice, pt called to discuss medication: semaglutide (OZEMPIC) 0.25 mg or 0.5 mg (2 mg/3 mL) pen injector 3 mL, pt stated she took medication "twice in one week" and needed to discuss, jeanmarie advise, thanks  "

## 2024-10-21 DIAGNOSIS — E11.9 TYPE 2 DIABETES MELLITUS WITHOUT COMPLICATION, WITHOUT LONG-TERM CURRENT USE OF INSULIN: ICD-10-CM

## 2024-10-21 DIAGNOSIS — E78.2 MIXED HYPERLIPIDEMIA: ICD-10-CM

## 2024-10-23 RX ORDER — CLOPIDOGREL BISULFATE 75 MG/1
75 TABLET ORAL DAILY
Qty: 30 TABLET | Refills: 0 | Status: SHIPPED | OUTPATIENT
Start: 2024-10-23 | End: 2024-11-22

## 2024-10-23 RX ORDER — ATORVASTATIN CALCIUM 40 MG/1
40 TABLET, FILM COATED ORAL DAILY
Qty: 90 TABLET | Refills: 1 | Status: SHIPPED | OUTPATIENT
Start: 2024-10-23 | End: 2025-04-21

## 2025-01-07 RX ORDER — CLOPIDOGREL BISULFATE 75 MG/1
75 TABLET ORAL DAILY
Qty: 30 TABLET | Refills: 1 | Status: SHIPPED | OUTPATIENT
Start: 2025-01-07 | End: 2025-01-09 | Stop reason: SDUPTHER

## 2025-01-07 NOTE — TELEPHONE ENCOUNTER
----- Message from Aminta sent at 1/6/2025  9:11 AM CST -----  Who Called: Serge Krishnamurthy    Refill or New Rx:Refill  RX Name and Strength:clopidogreL (PLAVIX) 75 mg tablet  How is the patient currently taking it? (ex. 1XDay): Take 1 tablet (75 mg total) by mouth once daily. - Oral  Is this a 30 day or 90 day RX:30  Local or Mail Order:local   List of preferred pharmacies on file (remove unneeded): [id number] - [place], [place] - [address] [facility] [address]      Ordering Provider:Alesia Mendoza NP      Preferred Method of Contact: Phone Call  Patient's Preferred Phone Number on File: [phone]   Best Call Back Number, if different:  Additional Information: Pt also wanting appt. Please advise.

## 2025-01-07 NOTE — TELEPHONE ENCOUNTER
Attempted to contact patient back regarding appointment request. No answer. Unable to leave a voicemail.

## 2025-01-09 RX ORDER — CLOPIDOGREL BISULFATE 75 MG/1
75 TABLET ORAL DAILY
Qty: 90 TABLET | Refills: 0 | Status: SHIPPED | OUTPATIENT
Start: 2025-01-09

## 2025-01-15 DIAGNOSIS — I10 PRIMARY HYPERTENSION: ICD-10-CM

## 2025-01-15 RX ORDER — OLMESARTAN MEDOXOMIL 20 MG/1
20 TABLET ORAL DAILY
Qty: 90 TABLET | Refills: 0 | Status: SHIPPED | OUTPATIENT
Start: 2025-01-15 | End: 2025-04-15

## 2025-02-04 DIAGNOSIS — E11.9 TYPE 2 DIABETES MELLITUS WITHOUT COMPLICATION, WITHOUT LONG-TERM CURRENT USE OF INSULIN: ICD-10-CM

## 2025-02-04 RX ORDER — SEMAGLUTIDE 0.68 MG/ML
0.25 INJECTION, SOLUTION SUBCUTANEOUS
Qty: 3 ML | Refills: 1 | Status: SHIPPED | OUTPATIENT
Start: 2025-02-04 | End: 2025-06-04

## 2025-02-04 NOTE — TELEPHONE ENCOUNTER
----- Message from Julia sent at 2/4/2025  1:29 PM CST -----  Regarding: refill  Who Called: Serge Krishnamurthy    Refill or New Rx:Refill    RX Name and Strength:semaglutide (OZEMPIC) 0.25 mg or 0.5 mg (2 mg/3 mL) pen injector    How is the patient currently taking it? (ex. 1XDay):1x weel    Is this a 30 day or 90 day RX:30    Local or Mail Order:local    List of preferred pharmacies on file (remove unneeded): [unfilled]    If different Pharmacy is requested, enter Pharmacy information here including location and phone number: [id number] - [place], [place] - [address] AT [facility] [address]       Ordering Provider:      Preferred Method of Contact: Phone Call  Patient's Preferred Phone Number on File: [phone]   Best Call Back Number, if different:  Additional Information:

## 2025-02-06 ENCOUNTER — TELEPHONE (OUTPATIENT)
Dept: FAMILY MEDICINE | Facility: CLINIC | Age: 50
End: 2025-02-06
Payer: MEDICAID

## 2025-02-06 NOTE — TELEPHONE ENCOUNTER
Pt called stating that she was concerned about some tests that she had completed and would like to know the results of them. She had a BP check today with Nurse Lana but pt stated she would not be able to make it she is in the hospital with her daughter. Pt stated that BP has been doing fine she wanted to know if she needed to reschedule this appt? Pt also stated that her right leg and foot is really swollen pt asked if you would like her to stop the Norvasc and try something else.    [Menstruating] : The patient is menstruating [Definite ___ (Date)] : the last menstrual period was [unfilled] [Normal Amount/Duration] : it was of a normal amount and duration [Regular Cycle Intervals] : have been regular [Total Preg ___] : G[unfilled] [Live Births ___] : P[unfilled]  [Full Term ___] : Full Term: [unfilled] [Living ___] : Living: [unfilled]

## 2025-02-06 NOTE — TELEPHONE ENCOUNTER
Called and spoke with patient's pharmacy regarding PA approval for Ozempic. Pharmacy states received approval and will fill Rx that will be ready tomorrow after 2:00PM. Called and spoke with patient, returning phone call. Verified . Patient made aware of information. Verbalized understanding.

## 2025-02-06 NOTE — TELEPHONE ENCOUNTER
----- Message from QuanTemplate sent at 2/6/2025 11:00 AM CST -----  Regarding: med advice  Who Called: Serge Krishnamurthy    Caller is requesting assistance/information from provider's office.    Symptoms (please be specific):    How long has patient had these symptoms:    List of preferred pharmacies on file (remove unneeded): [unfilled]  If different, enter pharmacy into here including location and phone number:       Preferred Method of Contact: Phone Call  Patient's Preferred Phone Number on File: [phone]   Best Call Back Number, if different:  Additional Information: pt is requesting a call back, would like to know how to get started back on Ozempic

## 2025-02-08 ENCOUNTER — HOSPITAL ENCOUNTER (EMERGENCY)
Facility: HOSPITAL | Age: 50
Discharge: HOME | End: 2025-02-08
Attending: EMERGENCY MEDICINE
Payer: MEDICAID

## 2025-02-08 VITALS
RESPIRATION RATE: 18 BRPM | BODY MASS INDEX: 40.18 KG/M2 | HEIGHT: 66 IN | SYSTOLIC BLOOD PRESSURE: 142 MMHG | OXYGEN SATURATION: 97 % | DIASTOLIC BLOOD PRESSURE: 90 MMHG | HEART RATE: 84 BPM | WEIGHT: 250 LBS | TEMPERATURE: 97 F

## 2025-02-08 DIAGNOSIS — Z20.822 COVID-19 RULED OUT BY LABORATORY TESTING: Primary | ICD-10-CM

## 2025-02-08 LAB
FLUAV AG UPPER RESP QL IA.RAPID: NOT DETECTED
FLUBV AG UPPER RESP QL IA.RAPID: NOT DETECTED
RSV A 5' UTR RNA NPH QL NAA+PROBE: NOT DETECTED
SARS-COV-2 RNA RESP QL NAA+PROBE: NOT DETECTED

## 2025-02-08 PROCEDURE — 99282 EMERGENCY DEPT VISIT SF MDM: CPT

## 2025-02-08 PROCEDURE — 0241U COVID/RSV/FLU A&B PCR: CPT | Performed by: EMERGENCY MEDICINE

## 2025-02-09 NOTE — ED PROVIDER NOTES
Encounter Date: 2/8/2025       History     Chief Complaint   Patient presents with    COVID-19 Concerns     Exposed to her daughter who has covid. Request testing. Denies sob.      49-year-old female history of asthma, CAD, diabetes, hypertension requesting COVID testing.  Patient said she was exposed to her daughter who is positive for COVID.  Patient denies any symptoms specifically sore throat, cough, body aches, fever    The history is provided by the patient.     Review of patient's allergies indicates:   Allergen Reactions    Lisinopril     Opioids - morphine analogues Other (See Comments)     Increase heart rate    Penicillins      Other reaction(s): short of breath, rash    Penicillins Hives    Toradol [ketorolac]      asprin    Opioids - morphine analogues Palpitations     Past Medical History:   Diagnosis Date    Asthma     Coronary artery disease     DM (diabetes mellitus)     Hypertension     NSTEMI (non-ST elevated myocardial infarction)      Past Surgical History:   Procedure Laterality Date    ANGIOGRAPHY      CHOLECYSTECTOMY      INTRAVASCULAR ULTRASOUND, CORONARY N/A 1/27/2023    Procedure: Ultrasound-coronary;  Surgeon: Evan Trejo MD;  Location: [facility] CATH LAB;  Service: Cardiology;  Laterality: N/A;    LEFT HEART CATHETERIZATION N/A 1/27/2023    Procedure: Left heart cath;  Surgeon: Evan Trejo MD;  Location: [facility] CATH LAB;  Service: Cardiology;  Laterality: N/A;    STENT, DRUG ELUTING, SINGLE VESSEL, CORONARY  1/27/2023    Procedure: Stent, Drug Eluting, Single Vessel, Coronary;  Surgeon: Evan Trejo MD;  Location: [facility] CATH LAB;  Service: Cardiology;;     Family History   Problem Relation Name Age of Onset    Diabetes Mother      Heart disease Mother      Diabetes Father Mata Krishnamurthy     Hypertension Father Mata Krishnamurthy     Heart disease Father Mata Krishnamurthy     Cancer Father Mata Krishnamurthy     Febrile seizures Father Maat Krishnamurthy      Social History     Tobacco Use     Smoking status: Every Day     Current packs/day: 0.50     Average packs/day: 0.5 packs/day for 1.1 years (0.6 ttl pk-yrs)     Types: Cigarettes     Start date: 1/1/2024     Last attempt to quit: 4/12/2023    Smokeless tobacco: Never   Substance Use Topics    Alcohol use: Not Currently    Drug use: Not Currently     Review of Systems   Constitutional:  Negative for chills, diaphoresis, fatigue and fever.   HENT:  Negative for congestion, drooling, ear discharge, ear pain, postnasal drip, rhinorrhea, sinus pressure, sinus pain, sore throat and tinnitus.    Eyes:  Negative for discharge.   Respiratory:  Negative for cough, chest tightness, shortness of breath and wheezing.    Cardiovascular:  Negative for chest pain and palpitations.   Gastrointestinal:  Negative for abdominal pain, diarrhea, nausea and vomiting.   Genitourinary:  Negative for frequency, hematuria and urgency.   Musculoskeletal:  Negative for back pain, neck pain and neck stiffness.   Skin:  Negative for rash.   Neurological:  Negative for syncope, weakness, light-headedness, numbness and headaches.   Psychiatric/Behavioral:  Negative for suicidal ideas.        Physical Exam     Initial Vitals [02/08/25 2141]   BP Pulse Resp Temp SpO2   (!) 144/93 86 18 97.2 °F (36.2 °C) 96 %      MAP       --         Physical Exam    Nursing note and vitals reviewed.  Constitutional: She appears well-developed. No distress.   HENT: Mouth/Throat: Oropharynx is clear and moist.   Eyes: Conjunctivae are normal.   Cardiovascular:  Normal rate.           Pulmonary/Chest: No respiratory distress.   Abdominal: Abdomen is soft. There is no abdominal tenderness. There is no guarding.   Musculoskeletal:         General: Normal range of motion.     Neurological: She is alert and oriented to person, place, and time. She has normal strength.   Skin: Skin is warm and dry.         ED Course   Procedures  Labs Reviewed   COVID/RSV/FLU A&B PCR - Normal       Result Value     Influenza A PCR Not Detected      Influenza B PCR Not Detected      Respiratory Syncytial Virus PCR Not Detected      SARS-CoV-2 PCR Not Detected      Narrative:     The Xpert Xpress SARS-CoV-2/FLU/RSV plus is a rapid, multiplexed real-time PCR test intended for the simultaneous qualitative detection and differentiation of SARS-CoV-2, Influenza A, Influenza B, and respiratory syncytial virus (RSV) viral RNA in either nasopharyngeal swab or nasal swab specimens.                Imaging Results    None          Medications - No data to display  Medical Decision Making  Medical Decision Making  Problem list/ differential diagnosis including but not limited to:  COVID testing      Patient's chronic illnesses impacting care:  none    Diagnostic test considered but not ordered:    My interpretations:      Radiology reports      Discussion of case with external qualified healthcare professionals:  Not applicable    Review of external notes( inpt, ems, NH, clinic):      Decision rules/scores:    Medications reviewed:  Medications ordered in the ER:  Discharge prescriptions:    Social variables possible impacting patient's healthcare:    Code status/discussion    Shared decision making:    Consideration for admission versus discharge: stable for discharge     Amount and/or Complexity of Data Reviewed  Labs:  Decision-making details documented in ED Course.               ED Course as of 02/08/25 2324   Sat Feb 08, 2025   2314 Influenza A, Molecular: Not Detected [WC]   2314 Influenza B, Molecular: Not Detected [WC]   2314 RSV Ag by Molecular Method: Not Detected [WC]   2314 SARS-CoV2 (COVID-19) Qualitative PCR: Not Detected [WC]      ED Course User Index  [WC] Denny Lamas MD                           Clinical Impression:  Final diagnoses:  [Z20.822] COVID-19 ruled out by laboratory testing (Primary)          ED Disposition Condition    Discharge Stable          ED Prescriptions    None       Follow-up Information     None          Denny Lamas MD  02/08/25 4891

## 2025-02-12 RX ORDER — INSULIN PUMP SYRINGE, 3 ML
1 EACH MISCELLANEOUS 2 TIMES DAILY
Qty: 1 EACH | Refills: 0 | Status: SHIPPED | OUTPATIENT
Start: 2025-02-12

## 2025-04-07 DIAGNOSIS — G89.29 CHRONIC BILATERAL LOW BACK PAIN WITH BILATERAL SCIATICA: ICD-10-CM

## 2025-04-07 DIAGNOSIS — I10 PRIMARY HYPERTENSION: ICD-10-CM

## 2025-04-07 DIAGNOSIS — M54.41 CHRONIC BILATERAL LOW BACK PAIN WITH BILATERAL SCIATICA: ICD-10-CM

## 2025-04-07 DIAGNOSIS — M54.42 CHRONIC BILATERAL LOW BACK PAIN WITH BILATERAL SCIATICA: ICD-10-CM

## 2025-04-07 RX ORDER — METOPROLOL SUCCINATE 50 MG/1
50 TABLET, EXTENDED RELEASE ORAL DAILY
Qty: 90 TABLET | Refills: 1 | Status: SHIPPED | OUTPATIENT
Start: 2025-04-07 | End: 2025-10-04

## 2025-04-07 RX ORDER — GABAPENTIN 300 MG/1
300 CAPSULE ORAL 3 TIMES DAILY
Qty: 90 CAPSULE | Refills: 0 | Status: SHIPPED | OUTPATIENT
Start: 2025-04-07 | End: 2025-05-07

## 2025-04-07 NOTE — TELEPHONE ENCOUNTER
Called and spoke with patient. Verified . Patient made aware refill requests proposed to provider and patient to be contacted once appointment is available for scheduling. Verbalized understanding.

## 2025-04-07 NOTE — TELEPHONE ENCOUNTER
----- Message from Omaira sent at 4/7/2025  9:59 AM CDT -----  Regarding: FW: med advice    ----- Message -----  From: Julia Camacho  Sent: 4/7/2025   8:13 AM CDT  To: [facility] Clerical Pool  Subject: med advice                                       Who Called: Serge Arabella is requesting assistance/information from provider's office.Symptoms (please be specific):  How long has patient had these symptoms:  List of preferred pharmacies on file (remove unneeded): [unfilled]If different, enter pharmacy into here including location and phone number: Preferred Method of Contact: Phone CallPatient's Preferred Phone Number on File: [phone] Call Back Number, if different:Additional Information: pt is requesting a call back regarding refill requests for Gabapentin and Metoprolol

## 2025-04-07 NOTE — TELEPHONE ENCOUNTER
----- Message from Omaira sent at 4/7/2025  9:40 AM CDT -----  Regarding: Serge Krishnamurthy-MRN# [id number]    ----- Message -----  From: Adelaida Jolley  Sent: 4/3/2025   9:12 AM CDT  To: [facility] Clerical Pool    .Who Called: Serge Kiranefill or New Rx:RefillRX Name and Strength:gabapentin (NEURONTIN) 300 MG capsuleHow is the patient currently taking it? (ex. 1XDay):3xIs this a 30 day or 90 day RX:90Local or Mail Order:localList of preferred pharmacies on file (remove unneeded): [unfilled]If different Pharmacy is requested, enter Pharmacy information here including location and phone number: [facility]  Ordering Provider:was calliePreferred Method of Contact: Phone CallPatient's Preferred Phone Number on File: [phone] Call Back Number, if different:Additional Information: .Who Called: Serge Bell is requesting a sooner appointment. Caller declined first available appointment listed below. Caller will not accept being placed on the waitlist and is requesting a message be sent to doctor.When is the first available appointment?n/aOptions offered (Virtual Visit, Urgent Care): f/uSymptoms:t had an appt on 1/8 that the pcp cancelled and thought that she was going to have an appt already schedule with the new pcp. Tried to schedule no luck . Called the back line no answer Preferred Method of Contact: Phone CallPatient's Preferred Phone Number on File: [phone] Best Call Back Number, if different:Additional Information:

## 2025-04-21 DIAGNOSIS — E11.9 TYPE 2 DIABETES MELLITUS WITHOUT COMPLICATION, WITHOUT LONG-TERM CURRENT USE OF INSULIN: ICD-10-CM

## 2025-04-23 DIAGNOSIS — I10 PRIMARY HYPERTENSION: ICD-10-CM

## 2025-04-23 RX ORDER — SEMAGLUTIDE 0.68 MG/ML
0.25 INJECTION, SOLUTION SUBCUTANEOUS
Qty: 3 ML | Refills: 1 | Status: SHIPPED | OUTPATIENT
Start: 2025-04-23 | End: 2025-08-21

## 2025-04-23 NOTE — TELEPHONE ENCOUNTER
----- Message from Omaira sent at 4/23/2025 11:59 AM CDT -----  Regarding: FW: refill med    ----- Message -----  From: Eugenie Haywood  Sent: 4/23/2025   9:51 AM CDT  To: [facility] Family Medicine Clerical Pool  Subject: refill med                                       Who Called: Serge LanerRefill or New Rx:RefillRX Name and Strength: olmesartan (BENICAR) 20 MG tabletHow is the patient currently taking it? (ex. 1XDay): 1x dayIs this a 30 day or 90 day RX:90Local or Mail Order:localList of preferred pharmacies on file (remove unneeded): [unfilled]If different Pharmacy is requested, enter Pharmacy information here including location and phone number:  Ordering Provider:Preferred Method of Contact: Phone CallPatient's Preferred Phone Number on File: [phone] Best Call Back Number, if different:Additional Information:

## 2025-04-24 RX ORDER — OLMESARTAN MEDOXOMIL 20 MG/1
20 TABLET ORAL DAILY
Qty: 90 TABLET | Refills: 0 | Status: SHIPPED | OUTPATIENT
Start: 2025-04-24 | End: 2025-07-23

## 2025-04-30 DIAGNOSIS — E78.2 MIXED HYPERLIPIDEMIA: ICD-10-CM

## 2025-04-30 RX ORDER — ATORVASTATIN CALCIUM 40 MG/1
40 TABLET, FILM COATED ORAL DAILY
Qty: 90 TABLET | Refills: 1 | Status: SHIPPED | OUTPATIENT
Start: 2025-04-30 | End: 2025-10-27

## 2025-05-07 DIAGNOSIS — G89.29 CHRONIC BILATERAL LOW BACK PAIN WITH BILATERAL SCIATICA: ICD-10-CM

## 2025-05-07 DIAGNOSIS — M54.41 CHRONIC BILATERAL LOW BACK PAIN WITH BILATERAL SCIATICA: ICD-10-CM

## 2025-05-07 DIAGNOSIS — M54.42 CHRONIC BILATERAL LOW BACK PAIN WITH BILATERAL SCIATICA: ICD-10-CM

## 2025-05-07 RX ORDER — GABAPENTIN 300 MG/1
300 CAPSULE ORAL 3 TIMES DAILY
Qty: 90 CAPSULE | Refills: 0 | Status: SHIPPED | OUTPATIENT
Start: 2025-05-07 | End: 2025-06-06

## 2025-07-11 ENCOUNTER — PATIENT MESSAGE (OUTPATIENT)
Dept: FAMILY MEDICINE | Facility: CLINIC | Age: 50
End: 2025-07-11
Payer: MEDICAID

## 2025-07-30 DIAGNOSIS — I25.10 CORONARY ARTERY DISEASE, UNSPECIFIED VESSEL OR LESION TYPE, UNSPECIFIED WHETHER ANGINA PRESENT, UNSPECIFIED WHETHER NATIVE OR TRANSPLANTED HEART: Primary | ICD-10-CM

## 2025-07-30 DIAGNOSIS — I10 PRIMARY HYPERTENSION: ICD-10-CM

## 2025-07-30 DIAGNOSIS — E11.9 TYPE 2 DIABETES MELLITUS WITHOUT COMPLICATION, WITHOUT LONG-TERM CURRENT USE OF INSULIN: ICD-10-CM

## 2025-07-31 RX ORDER — OLMESARTAN MEDOXOMIL 20 MG/1
TABLET ORAL
Qty: 90 TABLET | Refills: 0 | Status: SHIPPED | OUTPATIENT
Start: 2025-07-31

## 2025-07-31 RX ORDER — NITROGLYCERIN 0.4 MG/1
0.4 TABLET SUBLINGUAL EVERY 5 MIN PRN
Qty: 45 TABLET | Refills: 0 | Status: SHIPPED | OUTPATIENT
Start: 2025-07-31 | End: 2025-08-15

## (undated) DEVICE — CATH EAGLE EYE ST .014X20X150

## (undated) DEVICE — CATH NC QUANTUM APEX MR 4X20

## (undated) DEVICE — Device

## (undated) DEVICE — GUIDE LAUNCH 6FR AL 1.0

## (undated) DEVICE — CATH JACKY RADIAL 5FR 100CM

## (undated) DEVICE — BLLN SC EUPHORA RX 2.50MMX12MM

## (undated) DEVICE — GUIDE LAUNCHER 6FR AR 2.0

## (undated) DEVICE — KIT GLIDESHEATH SLEND 6FR 10CM

## (undated) DEVICE — PAD DEFIB CADENCE ADULT R2

## (undated) DEVICE — DEVICE INDEFLATOR BASIX

## (undated) DEVICE — GUIDE LAUNCHER 6FR JR 4.0

## (undated) DEVICE — SET EXT NAMIC ARTERIAL 12IN

## (undated) DEVICE — BAND TR COMP DEVICE REG 24CM

## (undated) DEVICE — GUIDEWIRE INQWIRE SE 3MM JTIP

## (undated) DEVICE — GUIDEWIRE RUNTHROUGH EF 180CM

## (undated) DEVICE — CANNULA NASAL ADULT

## (undated) DEVICE — VALVE GUARDIAN II HEMOSTASIS

## (undated) DEVICE — STENT ONYXNG35038UX ONYX 3.50X38RX
Type: IMPLANTABLE DEVICE | Site: CORONARY | Status: NON-FUNCTIONAL
Brand: ONYX FRONTIER™

## (undated) DEVICE — SET ANGIO ACIST CVI ANGIOTOUCH